# Patient Record
Sex: FEMALE | Race: WHITE | NOT HISPANIC OR LATINO | Employment: FULL TIME | URBAN - METROPOLITAN AREA
[De-identification: names, ages, dates, MRNs, and addresses within clinical notes are randomized per-mention and may not be internally consistent; named-entity substitution may affect disease eponyms.]

---

## 2018-04-03 ENCOUNTER — NON-PROVIDER VISIT (OUTPATIENT)
Dept: URGENT CARE | Facility: CLINIC | Age: 53
End: 2018-04-03

## 2018-04-03 DIAGNOSIS — Z11.1 PPD SCREENING TEST: ICD-10-CM

## 2018-04-03 PROCEDURE — 86580 TB INTRADERMAL TEST: CPT | Performed by: NURSE PRACTITIONER

## 2018-04-03 NOTE — PROGRESS NOTES
Maryjane Gray is a 52 y.o. female here for a non-provider visit for PPD placement -- Step 1 of 1    Reason for PPD:  work requirement    1. TB evaluation questionnaire completed by patient? Yes      -  If any answers marked yes did you contact a provider prior to placing? Not Indicated  2.  Patient notified to return to clinic for reading on: Thursday 4/5/18 after 3:44pm, but before Friday, 4/6/18 at 3:44pm  3.  PPD Placement documentation completed on TB evaluation questionnaire? Yes  4.  Location of TB evaluation questionnaire filed:

## 2018-04-05 ENCOUNTER — APPOINTMENT (OUTPATIENT)
Dept: URGENT CARE | Facility: CLINIC | Age: 53
End: 2018-04-05

## 2018-04-05 LAB — TB WHEAL 3D P 5 TU DIAM: 0 MM

## 2019-07-19 ENCOUNTER — OFFICE VISIT (OUTPATIENT)
Dept: URGENT CARE | Facility: CLINIC | Age: 54
End: 2019-07-19
Payer: COMMERCIAL

## 2019-07-19 VITALS
HEIGHT: 65 IN | RESPIRATION RATE: 12 BRPM | SYSTOLIC BLOOD PRESSURE: 122 MMHG | TEMPERATURE: 99.4 F | DIASTOLIC BLOOD PRESSURE: 72 MMHG | BODY MASS INDEX: 24.66 KG/M2 | OXYGEN SATURATION: 97 % | WEIGHT: 148 LBS | HEART RATE: 72 BPM

## 2019-07-19 DIAGNOSIS — J02.0 PHARYNGITIS DUE TO STREPTOCOCCUS SPECIES: ICD-10-CM

## 2019-07-19 LAB
INT CON NEG: NORMAL
INT CON POS: NORMAL
S PYO AG THROAT QL: NORMAL

## 2019-07-19 PROCEDURE — 87880 STREP A ASSAY W/OPTIC: CPT | Performed by: PHYSICIAN ASSISTANT

## 2019-07-19 PROCEDURE — 99203 OFFICE O/P NEW LOW 30 MIN: CPT | Performed by: PHYSICIAN ASSISTANT

## 2019-07-19 RX ORDER — AMOXICILLIN 500 MG/1
1000 CAPSULE ORAL 2 TIMES DAILY
Qty: 40 CAP | Refills: 0 | Status: SHIPPED | OUTPATIENT
Start: 2019-07-19 | End: 2019-07-29

## 2019-07-19 ASSESSMENT — ENCOUNTER SYMPTOMS
PALPITATIONS: 0
FEVER: 0
DIARRHEA: 0
BLURRED VISION: 0
SHORTNESS OF BREATH: 0
EYE PAIN: 0
VOMITING: 0
DIZZINESS: 0
SWOLLEN GLANDS: 1
ABDOMINAL PAIN: 0
HEADACHES: 1
TROUBLE SWALLOWING: 1
COUGH: 0
SORE THROAT: 1
SINUS PAIN: 0
CHILLS: 0
NAUSEA: 0
MYALGIAS: 1

## 2019-07-19 ASSESSMENT — PAIN SCALES - GENERAL: PAINLEVEL: 7=MODERATE-SEVERE PAIN

## 2019-07-19 NOTE — PROGRESS NOTES
Subjective:   Maryjane Suero a 54 y.o. female who presents for Sore Throat (x2 days); Headache; and Body Aches    Pharyngitis    This is a new problem. The current episode started yesterday. The problem has been unchanged. Neither side of throat is experiencing more pain than the other. There has been no fever. The pain is moderate. Associated symptoms include headaches, a plugged ear sensation, swollen glands and trouble swallowing. Pertinent negatives include no abdominal pain, congestion, coughing, diarrhea, ear pain, shortness of breath or vomiting. She has had no exposure to strep or mono. She has tried NSAIDs for the symptoms. The treatment provided mild relief.       Review of Systems   Constitutional: Positive for malaise/fatigue. Negative for chills and fever.   HENT: Positive for sore throat and trouble swallowing. Negative for congestion, ear pain and sinus pain.    Eyes: Negative for blurred vision and pain.   Respiratory: Negative for cough and shortness of breath.    Cardiovascular: Negative for chest pain and palpitations.   Gastrointestinal: Negative for abdominal pain, diarrhea, nausea and vomiting.   Musculoskeletal: Positive for myalgias.   Skin: Negative for rash.   Neurological: Positive for headaches. Negative for dizziness.        PMH:  has no past medical history on file.  MEDS:   Current Outpatient Prescriptions:   •  Pseudoephedrine-APAP-DM (DAYQUIL PO), Take  by mouth., Disp: , Rfl:   •  amoxicillin (AMOXIL) 500 MG Cap, Take 2 Caps by mouth 2 times a day for 10 days., Disp: 40 Cap, Rfl: 0  ALLERGIES: No Known Allergies  SURGHX:   Past Surgical History:   Procedure Laterality Date   • ABDOMINAL HYSTERECTOMY TOTAL       SOCHX:  reports that she has never smoked. She has never used smokeless tobacco. She reports that she does not drink alcohol or use drugs.  FH: Family history was reviewed, no pertinent findings to report     Objective:   /72   Pulse 72   Temp 37.4 °C (99.4 °F)  "(Temporal)   Resp 12   Ht 1.638 m (5' 4.5\")   Wt 67.1 kg (148 lb)   SpO2 97%   Breastfeeding? No   BMI 25.01 kg/m²      Physical Exam   Constitutional: She is oriented to person, place, and time. She appears well-developed and well-nourished.   HENT:   Head: Normocephalic and atraumatic.   Right Ear: Tympanic membrane, external ear and ear canal normal.   Left Ear: Tympanic membrane, external ear and ear canal normal.   Nose: Nose normal.   Mouth/Throat: Uvula is midline and mucous membranes are normal. Posterior oropharyngeal erythema present.   Eyes: Pupils are equal, round, and reactive to light. Conjunctivae are normal.   Neck: Normal range of motion.   Cardiovascular: Normal rate, regular rhythm and normal heart sounds.    No murmur heard.  Pulmonary/Chest: Effort normal and breath sounds normal. She has no wheezes.   Lymphadenopathy:     She has cervical adenopathy.   Neurological: She is alert and oriented to person, place, and time.   Skin: Skin is warm and dry. Capillary refill takes less than 2 seconds.   Psychiatric: She has a normal mood and affect. Her behavior is normal. Judgment normal.   Vitals reviewed.      POCT Rapid Strep A - POSITIVE    Assessment/Plan:     1. Pharyngitis due to Streptococcus species  - POCT Rapid Strep A  - amoxicillin (AMOXIL) 500 MG Cap; Take 2 Caps by mouth 2 times a day for 10 days.  Dispense: 40 Cap; Refill: 0        Differential diagnosis, natural history, supportive care, and indications for immediate follow-up discussed.  Return if symptoms worsen or fail to improve.    "

## 2019-07-19 NOTE — PATIENT INSTRUCTIONS
Strep Throat  Strep throat is a bacterial infection of the throat. Your health care provider may call the infection tonsillitis or pharyngitis, depending on whether there is swelling in the tonsils or at the back of the throat. Strep throat is most common during the cold months of the year in children who are 5-15 years of age, but it can happen during any season in people of any age. This infection is spread from person to person (contagious) through coughing, sneezing, or close contact.  What are the causes?  Strep throat is caused by the bacteria called Streptococcus pyogenes.  What increases the risk?  This condition is more likely to develop in:  · People who spend time in crowded places where the infection can spread easily.  · People who have close contact with someone who has strep throat.  What are the signs or symptoms?  Symptoms of this condition include:  · Fever or chills.  · Redness, swelling, or pain in the tonsils or throat.  · Pain or difficulty when swallowing.  · White or yellow spots on the tonsils or throat.  · Swollen, tender glands in the neck or under the jaw.  · Red rash all over the body (rare).  How is this diagnosed?  This condition is diagnosed by performing a rapid strep test or by taking a swab of your throat (throat culture test). Results from a rapid strep test are usually ready in a few minutes, but throat culture test results are available after one or two days.  How is this treated?  This condition is treated with antibiotic medicine.  Follow these instructions at home:  Medicines  · Take over-the-counter and prescription medicines only as told by your health care provider.  · Take your antibiotic as told by your health care provider. Do not stop taking the antibiotic even if you start to feel better.  · Have family members who also have a sore throat or fever tested for strep throat. They may need antibiotics if they have the strep infection.  Eating and drinking  · Do not  share food, drinking cups, or personal items that could cause the infection to spread to other people.  · If swallowing is difficult, try eating soft foods until your sore throat feels better.  · Drink enough fluid to keep your urine clear or pale yellow.  General instructions  · Gargle with a salt-water mixture 3-4 times per day or as needed. To make a salt-water mixture, completely dissolve ½-1 tsp of salt in 1 cup of warm water.  · Make sure that all household members wash their hands well.  · Get plenty of rest.  · Stay home from school or work until you have been taking antibiotics for 24 hours.  · Keep all follow-up visits as told by your health care provider. This is important.  Contact a health care provider if:  · The glands in your neck continue to get bigger.  · You develop a rash, cough, or earache.  · You cough up a thick liquid that is green, yellow-brown, or bloody.  · You have pain or discomfort that does not get better with medicine.  · Your problems seem to be getting worse rather than better.  · You have a fever.  Get help right away if:  · You have new symptoms, such as vomiting, severe headache, stiff or painful neck, chest pain, or shortness of breath.  · You have severe throat pain, drooling, or changes in your voice.  · You have swelling of the neck, or the skin on the neck becomes red and tender.  · You have signs of dehydration, such as fatigue, dry mouth, and decreased urination.  · You become increasingly sleepy, or you cannot wake up completely.  · Your joints become red or painful.  This information is not intended to replace advice given to you by your health care provider. Make sure you discuss any questions you have with your health care provider.  Document Released: 12/15/2001 Document Revised: 08/16/2017 Document Reviewed: 04/11/2016  [employer] Interactive Patient Education © 2017 [employer]

## 2019-07-19 NOTE — LETTER
July 19, 2019         Patient: Maryjane Gray   YOB: 1965   Date of Visit: 7/19/2019           To Whom it May Concern:    Maryjane Gray was seen in my clinic on 7/19/2019. She may return to work once she has been on antibiotics for 24 hours.    If you have any questions or concerns, please don't hesitate to call.        Sincerely,           Afshan Melgar P.A.-C.  Electronically Signed

## 2019-11-07 ENCOUNTER — OFFICE VISIT (OUTPATIENT)
Dept: URGENT CARE | Facility: CLINIC | Age: 54
End: 2019-11-07
Payer: COMMERCIAL

## 2019-11-07 VITALS
RESPIRATION RATE: 20 BRPM | BODY MASS INDEX: 24.5 KG/M2 | SYSTOLIC BLOOD PRESSURE: 120 MMHG | OXYGEN SATURATION: 95 % | DIASTOLIC BLOOD PRESSURE: 78 MMHG | TEMPERATURE: 98.9 F | HEART RATE: 66 BPM | WEIGHT: 145 LBS

## 2019-11-07 DIAGNOSIS — R05.9 COUGH: ICD-10-CM

## 2019-11-07 DIAGNOSIS — J01.00 ACUTE MAXILLARY SINUSITIS, RECURRENCE NOT SPECIFIED: ICD-10-CM

## 2019-11-07 PROCEDURE — 99214 OFFICE O/P EST MOD 30 MIN: CPT | Performed by: NURSE PRACTITIONER

## 2019-11-07 RX ORDER — AMOXICILLIN AND CLAVULANATE POTASSIUM 875; 125 MG/1; MG/1
1 TABLET, FILM COATED ORAL 2 TIMES DAILY
Qty: 14 TAB | Refills: 0 | Status: SHIPPED | OUTPATIENT
Start: 2019-11-07 | End: 2019-11-14

## 2019-11-07 RX ORDER — BENZONATATE 200 MG/1
200 CAPSULE ORAL 3 TIMES DAILY PRN
Qty: 60 CAP | Refills: 0 | Status: SHIPPED | OUTPATIENT
Start: 2019-11-07 | End: 2021-12-14

## 2019-11-07 ASSESSMENT — ENCOUNTER SYMPTOMS
HEADACHES: 1
MYALGIAS: 1
FEVER: 0
SPUTUM PRODUCTION: 1
CHILLS: 0
COUGH: 1
RHINORRHEA: 1

## 2019-11-07 NOTE — PROGRESS NOTES
Subjective:      Maryjane Gray is a 54 y.o. female who presents with Cough (X 7 days dry cough , sorethroat , hoarseness.)    History reviewed. No pertinent past medical history.  Social History     Socioeconomic History   • Marital status: Single     Spouse name: Not on file   • Number of children: Not on file   • Years of education: Not on file   • Highest education level: Not on file   Occupational History   • Not on file   Social Needs   • Financial resource strain: Not on file   • Food insecurity:     Worry: Not on file     Inability: Not on file   • Transportation needs:     Medical: Not on file     Non-medical: Not on file   Tobacco Use   • Smoking status: Never Smoker   • Smokeless tobacco: Never Used   Substance and Sexual Activity   • Alcohol use: No   • Drug use: No   • Sexual activity: Not on file   Lifestyle   • Physical activity:     Days per week: Not on file     Minutes per session: Not on file   • Stress: Not on file   Relationships   • Social connections:     Talks on phone: Not on file     Gets together: Not on file     Attends Mandaeism service: Not on file     Active member of club or organization: Not on file     Attends meetings of clubs or organizations: Not on file     Relationship status: Not on file   • Intimate partner violence:     Fear of current or ex partner: Not on file     Emotionally abused: Not on file     Physically abused: Not on file     Forced sexual activity: Not on file   Other Topics Concern   • Not on file   Social History Narrative   • Not on file     History reviewed. No pertinent family history.    Allergies: Patient has no known allergies.    Patient is a 54-year-old female who presents today with complaint of pain to the maxillary sinuses with thick yellow nasal drainage.  She has had also a dry cough and hoarseness over the last week.  Denies fever, aches, or chills.        Cough   This is a new problem. The current episode started in the past 7 days. The problem  has been unchanged. The problem occurs every few minutes. The cough is productive of sputum. Associated symptoms include ear pain, headaches, myalgias, nasal congestion, postnasal drip and rhinorrhea. Pertinent negatives include no chills or fever. She has tried nothing for the symptoms. The treatment provided no relief.       Review of Systems   Constitutional: Positive for malaise/fatigue. Negative for chills and fever.   HENT: Positive for congestion, ear pain, postnasal drip and rhinorrhea.    Respiratory: Positive for cough and sputum production.    Musculoskeletal: Positive for myalgias.   Skin: Negative.    Neurological: Positive for headaches.   All other systems reviewed and are negative.         Objective:     /78   Pulse 66   Temp 37.2 °C (98.9 °F) (Temporal)   Resp 20   Wt 65.8 kg (145 lb)   SpO2 95%   BMI 24.50 kg/m²      Physical Exam  Vitals signs reviewed.   HENT:      Head: Normocephalic.      Comments: Tender over the maxillary sinuses, thick yellow PND  Voice is hoarse     Right Ear: Tympanic membrane normal.      Left Ear: Tympanic membrane normal.      Nose: Nose normal.   Eyes:      Pupils: Pupils are equal, round, and reactive to light.   Neck:      Musculoskeletal: Normal range of motion and neck supple.   Cardiovascular:      Rate and Rhythm: Normal rate and regular rhythm.   Pulmonary:      Effort: Pulmonary effort is normal.      Breath sounds: Normal breath sounds.      Comments: Dry cough  Musculoskeletal: Normal range of motion.   Skin:     General: Skin is warm and dry.      Capillary Refill: Capillary refill takes less than 2 seconds.   Neurological:      Mental Status: She is alert.   Psychiatric:         Mood and Affect: Mood normal.         Behavior: Behavior normal.                 Assessment/Plan:   Sinusitis  Cough    augmentin  Tessalon PRN cough  Push fluids  Humidifier  Follow up for persistent or worsening of symptoms     There are no diagnoses linked to this  encounter.

## 2020-05-14 ENCOUNTER — NON-PROVIDER VISIT (OUTPATIENT)
Dept: URGENT CARE | Facility: CLINIC | Age: 55
End: 2020-05-14

## 2020-05-14 DIAGNOSIS — Z11.1 PPD SCREENING TEST: ICD-10-CM

## 2020-05-16 ENCOUNTER — NON-PROVIDER VISIT (OUTPATIENT)
Dept: URGENT CARE | Facility: CLINIC | Age: 55
End: 2020-05-16

## 2020-05-16 LAB — TB WHEAL 3D P 5 TU DIAM: 0 MM

## 2020-05-16 PROCEDURE — 86580 TB INTRADERMAL TEST: CPT | Performed by: PHYSICIAN ASSISTANT

## 2020-05-16 NOTE — NON-PROVIDER
Maryjane Gray is a 54 y.o. female here for a non-provider visit for PPD reading -- Step 1 of 1.      1.  Resulted in Epic under enter/edit results? Yes   2.  TB evaluation questionnaire scanned into chart and original given to patient?Yes      3. Was induration greater than 0 mm? No.

## 2020-05-16 NOTE — NON-PROVIDER
Maryjane Gray is a 54 y.o. female here for a non-provider visit for PPD placement -- Step 1 of 1    Reason for PPD:  work requirement    1. TB evaluation questionnaire completed by patient? Yes      -  If any answers marked yes did you contact a provider prior to placing? Not Indicated  2.  Patient notified to return to clinic for reading on: 05/16/20 or 05/17/20  3.  PPD Placement documentation completed on TB evaluation questionnaire? Yes  4.  Location of TB evaluation questionnaire filed:

## 2021-12-14 ENCOUNTER — APPOINTMENT (OUTPATIENT)
Dept: RADIOLOGY | Facility: MEDICAL CENTER | Age: 56
End: 2021-12-14
Attending: EMERGENCY MEDICINE
Payer: COMMERCIAL

## 2021-12-14 ENCOUNTER — HOSPITAL ENCOUNTER (INPATIENT)
Facility: MEDICAL CENTER | Age: 56
LOS: 3 days | DRG: 064 | End: 2021-12-17
Attending: INTERNAL MEDICINE | Admitting: HOSPITALIST
Payer: COMMERCIAL

## 2021-12-14 ENCOUNTER — HOSPITAL ENCOUNTER (EMERGENCY)
Facility: MEDICAL CENTER | Age: 56
End: 2021-12-14
Payer: COMMERCIAL

## 2021-12-14 ENCOUNTER — APPOINTMENT (OUTPATIENT)
Dept: RADIOLOGY | Facility: MEDICAL CENTER | Age: 56
DRG: 064 | End: 2021-12-14
Attending: NURSE PRACTITIONER
Payer: COMMERCIAL

## 2021-12-14 ENCOUNTER — APPOINTMENT (OUTPATIENT)
Dept: RADIOLOGY | Facility: MEDICAL CENTER | Age: 56
DRG: 064 | End: 2021-12-14
Attending: PSYCHIATRY & NEUROLOGY
Payer: COMMERCIAL

## 2021-12-14 VITALS
HEIGHT: 64 IN | DIASTOLIC BLOOD PRESSURE: 83 MMHG | HEART RATE: 80 BPM | WEIGHT: 145 LBS | SYSTOLIC BLOOD PRESSURE: 186 MMHG | TEMPERATURE: 98.1 F | RESPIRATION RATE: 17 BRPM | OXYGEN SATURATION: 95 % | BODY MASS INDEX: 24.75 KG/M2

## 2021-12-14 DIAGNOSIS — I61.9 INTRAPARENCHYMAL HEMORRHAGE OF BRAIN (HCC): ICD-10-CM

## 2021-12-14 DIAGNOSIS — I10 HYPERTENSION, UNSPECIFIED TYPE: ICD-10-CM

## 2021-12-14 DIAGNOSIS — I62.9 INTRACRANIAL HEMORRHAGE (HCC): ICD-10-CM

## 2021-12-14 LAB
ALBUMIN SERPL BCP-MCNC: 4.2 G/DL (ref 3.2–4.9)
ALBUMIN/GLOB SERPL: 1.2 G/DL
ALP SERPL-CCNC: 112 U/L (ref 30–99)
ALT SERPL-CCNC: 63 U/L (ref 2–50)
ANION GAP SERPL CALC-SCNC: 14 MMOL/L (ref 7–16)
APTT PPP: 29.6 SEC (ref 24.7–36)
AST SERPL-CCNC: 45 U/L (ref 12–45)
BASOPHILS # BLD AUTO: 0.8 % (ref 0–1.8)
BASOPHILS # BLD: 0.07 K/UL (ref 0–0.12)
BILIRUB SERPL-MCNC: 0.4 MG/DL (ref 0.1–1.5)
BUN SERPL-MCNC: 14 MG/DL (ref 8–22)
CALCIUM SERPL-MCNC: 9.4 MG/DL (ref 8.4–10.2)
CFT BLD TEG: 6.1 MIN (ref 4.6–9.1)
CFT P HPASE BLD TEG: 5.3 MIN (ref 4.3–8.3)
CHLORIDE SERPL-SCNC: 106 MMOL/L (ref 96–112)
CHOLEST SERPL-MCNC: 264 MG/DL (ref 100–199)
CLOT ANGLE BLD TEG: 74.6 DEGREES (ref 63–78)
CLOT LYSIS 30M P MA LENFR BLD TEG: 0 % (ref 0–2.6)
CO2 SERPL-SCNC: 22 MMOL/L (ref 20–33)
CREAT SERPL-MCNC: 0.65 MG/DL (ref 0.5–1.4)
CT.EXTRINSIC BLD ROTEM: 1.2 MIN (ref 0.8–2.1)
EKG IMPRESSION: NORMAL
EOSINOPHIL # BLD AUTO: 0.8 K/UL (ref 0–0.51)
EOSINOPHIL NFR BLD: 9.3 % (ref 0–6.9)
ERYTHROCYTE [DISTWIDTH] IN BLOOD BY AUTOMATED COUNT: 44.2 FL (ref 35.9–50)
GLOBULIN SER CALC-MCNC: 3.5 G/DL (ref 1.9–3.5)
GLUCOSE BLD-MCNC: 101 MG/DL (ref 65–99)
GLUCOSE SERPL-MCNC: 98 MG/DL (ref 65–99)
HCT VFR BLD AUTO: 46 % (ref 37–47)
HDLC SERPL-MCNC: 48 MG/DL
HGB BLD-MCNC: 15.3 G/DL (ref 12–16)
IMM GRANULOCYTES # BLD AUTO: 0.01 K/UL (ref 0–0.11)
IMM GRANULOCYTES NFR BLD AUTO: 0.1 % (ref 0–0.9)
INR PPP: 0.95 (ref 0.87–1.13)
LDLC SERPL CALC-MCNC: 179 MG/DL
LYMPHOCYTES # BLD AUTO: 3.63 K/UL (ref 1–4.8)
LYMPHOCYTES NFR BLD: 42.2 % (ref 22–41)
MCF BLD TEG: 60.6 MM (ref 52–69)
MCF.PLATELET INHIB BLD ROTEM: 22.6 MM (ref 15–32)
MCH RBC QN AUTO: 30.1 PG (ref 27–33)
MCHC RBC AUTO-ENTMCNC: 33.3 G/DL (ref 33.6–35)
MCV RBC AUTO: 90.4 FL (ref 81.4–97.8)
MONOCYTES # BLD AUTO: 0.68 K/UL (ref 0–0.85)
MONOCYTES NFR BLD AUTO: 7.9 % (ref 0–13.4)
NEUTROPHILS # BLD AUTO: 3.42 K/UL (ref 2–7.15)
NEUTROPHILS NFR BLD: 39.7 % (ref 44–72)
NRBC # BLD AUTO: 0 K/UL
NRBC BLD-RTO: 0 /100 WBC
PA AA BLD-ACNC: 64.2 % (ref 0–11)
PA ADP BLD-ACNC: 15.4 % (ref 0–17)
PLATELET # BLD AUTO: 190 K/UL (ref 164–446)
PMV BLD AUTO: 10.9 FL (ref 9–12.9)
POTASSIUM SERPL-SCNC: 3.6 MMOL/L (ref 3.6–5.5)
PROT SERPL-MCNC: 7.7 G/DL (ref 6–8.2)
PROTHROMBIN TIME: 11.9 SEC (ref 12–14.6)
RBC # BLD AUTO: 5.09 M/UL (ref 4.2–5.4)
SODIUM SERPL-SCNC: 142 MMOL/L (ref 135–145)
SODIUM SERPL-SCNC: 143 MMOL/L (ref 135–145)
SODIUM SERPL-SCNC: 144 MMOL/L (ref 135–145)
TEG ALGORITHM TGALG: ABNORMAL
TRIGL SERPL-MCNC: 184 MG/DL (ref 0–149)
TROPONIN T SERPL-MCNC: <6 NG/L (ref 6–19)
WBC # BLD AUTO: 8.6 K/UL (ref 4.8–10.8)

## 2021-12-14 PROCEDURE — 70450 CT HEAD/BRAIN W/O DYE: CPT

## 2021-12-14 PROCEDURE — 92610 EVALUATE SWALLOWING FUNCTION: CPT

## 2021-12-14 PROCEDURE — 700111 HCHG RX REV CODE 636 W/ 250 OVERRIDE (IP): Performed by: PSYCHIATRY & NEUROLOGY

## 2021-12-14 PROCEDURE — 700101 HCHG RX REV CODE 250: Performed by: STUDENT IN AN ORGANIZED HEALTH CARE EDUCATION/TRAINING PROGRAM

## 2021-12-14 PROCEDURE — 82962 GLUCOSE BLOOD TEST: CPT

## 2021-12-14 PROCEDURE — 700101 HCHG RX REV CODE 250: Performed by: EMERGENCY MEDICINE

## 2021-12-14 PROCEDURE — 80053 COMPREHEN METABOLIC PANEL: CPT

## 2021-12-14 PROCEDURE — 80061 LIPID PANEL: CPT

## 2021-12-14 PROCEDURE — 85730 THROMBOPLASTIN TIME PARTIAL: CPT

## 2021-12-14 PROCEDURE — 99291 CRITICAL CARE FIRST HOUR: CPT | Performed by: PSYCHIATRY & NEUROLOGY

## 2021-12-14 PROCEDURE — 99285 EMERGENCY DEPT VISIT HI MDM: CPT

## 2021-12-14 PROCEDURE — 85025 COMPLETE CBC W/AUTO DIFF WBC: CPT

## 2021-12-14 PROCEDURE — 700105 HCHG RX REV CODE 258: Performed by: STUDENT IN AN ORGANIZED HEALTH CARE EDUCATION/TRAINING PROGRAM

## 2021-12-14 PROCEDURE — 700105 HCHG RX REV CODE 258: Performed by: PSYCHIATRY & NEUROLOGY

## 2021-12-14 PROCEDURE — 96374 THER/PROPH/DIAG INJ IV PUSH: CPT

## 2021-12-14 PROCEDURE — 84295 ASSAY OF SERUM SODIUM: CPT

## 2021-12-14 PROCEDURE — 99223 1ST HOSP IP/OBS HIGH 75: CPT | Performed by: STUDENT IN AN ORGANIZED HEALTH CARE EDUCATION/TRAINING PROGRAM

## 2021-12-14 PROCEDURE — 85610 PROTHROMBIN TIME: CPT

## 2021-12-14 PROCEDURE — 770022 HCHG ROOM/CARE - ICU (200)

## 2021-12-14 PROCEDURE — 93005 ELECTROCARDIOGRAM TRACING: CPT | Performed by: EMERGENCY MEDICINE

## 2021-12-14 PROCEDURE — 84484 ASSAY OF TROPONIN QUANT: CPT

## 2021-12-14 PROCEDURE — 85576 BLOOD PLATELET AGGREGATION: CPT | Mod: 91

## 2021-12-14 PROCEDURE — 85347 COAGULATION TIME ACTIVATED: CPT

## 2021-12-14 PROCEDURE — 700105 HCHG RX REV CODE 258: Performed by: EMERGENCY MEDICINE

## 2021-12-14 PROCEDURE — 99223 1ST HOSP IP/OBS HIGH 75: CPT | Performed by: PHYSICAL MEDICINE & REHABILITATION

## 2021-12-14 PROCEDURE — 85384 FIBRINOGEN ACTIVITY: CPT

## 2021-12-14 RX ORDER — ACETAMINOPHEN 325 MG/1
650 TABLET ORAL EVERY 4 HOURS PRN
Status: DISCONTINUED | OUTPATIENT
Start: 2021-12-14 | End: 2021-12-17 | Stop reason: HOSPADM

## 2021-12-14 RX ORDER — ONDANSETRON 4 MG/1
4 TABLET, ORALLY DISINTEGRATING ORAL EVERY 4 HOURS PRN
Status: DISCONTINUED | OUTPATIENT
Start: 2021-12-14 | End: 2021-12-17 | Stop reason: HOSPADM

## 2021-12-14 RX ORDER — ONDANSETRON 2 MG/ML
4 INJECTION INTRAMUSCULAR; INTRAVENOUS EVERY 4 HOURS PRN
Status: DISCONTINUED | OUTPATIENT
Start: 2021-12-14 | End: 2021-12-14

## 2021-12-14 RX ORDER — ONDANSETRON 4 MG/1
4 TABLET, ORALLY DISINTEGRATING ORAL EVERY 4 HOURS PRN
Status: DISCONTINUED | OUTPATIENT
Start: 2021-12-14 | End: 2021-12-14

## 2021-12-14 RX ORDER — BISACODYL 10 MG
10 SUPPOSITORY, RECTAL RECTAL
Status: DISCONTINUED | OUTPATIENT
Start: 2021-12-14 | End: 2021-12-17 | Stop reason: HOSPADM

## 2021-12-14 RX ORDER — SODIUM CHLORIDE 9 MG/ML
INJECTION, SOLUTION INTRAVENOUS CONTINUOUS
Status: DISCONTINUED | OUTPATIENT
Start: 2021-12-14 | End: 2021-12-14

## 2021-12-14 RX ORDER — LORAZEPAM 2 MG/ML
2 INJECTION INTRAMUSCULAR
Status: DISCONTINUED | OUTPATIENT
Start: 2021-12-14 | End: 2021-12-17 | Stop reason: HOSPADM

## 2021-12-14 RX ORDER — BISACODYL 10 MG
10 SUPPOSITORY, RECTAL RECTAL
Status: DISCONTINUED | OUTPATIENT
Start: 2021-12-14 | End: 2021-12-14

## 2021-12-14 RX ORDER — NICARDIPINE HYDROCHLORIDE 2.5 MG/ML
INJECTION INTRAVENOUS
Status: DISCONTINUED
Start: 2021-12-14 | End: 2021-12-14 | Stop reason: HOSPADM

## 2021-12-14 RX ORDER — LABETALOL HYDROCHLORIDE 5 MG/ML
INJECTION, SOLUTION INTRAVENOUS
Status: DISCONTINUED
Start: 2021-12-14 | End: 2021-12-14 | Stop reason: HOSPADM

## 2021-12-14 RX ORDER — ACETAMINOPHEN 325 MG/1
650 TABLET ORAL EVERY 4 HOURS PRN
Status: DISCONTINUED | OUTPATIENT
Start: 2021-12-14 | End: 2021-12-14

## 2021-12-14 RX ORDER — AMOXICILLIN 250 MG
2 CAPSULE ORAL 2 TIMES DAILY
Status: DISCONTINUED | OUTPATIENT
Start: 2021-12-14 | End: 2021-12-17 | Stop reason: HOSPADM

## 2021-12-14 RX ORDER — ONDANSETRON 2 MG/ML
4 INJECTION INTRAMUSCULAR; INTRAVENOUS EVERY 4 HOURS PRN
Status: DISCONTINUED | OUTPATIENT
Start: 2021-12-14 | End: 2021-12-17 | Stop reason: HOSPADM

## 2021-12-14 RX ORDER — 3% SODIUM CHLORIDE 3 G/100ML
500 INJECTION, SOLUTION INTRAVENOUS CONTINUOUS
Status: DISCONTINUED | OUTPATIENT
Start: 2021-12-14 | End: 2021-12-16

## 2021-12-14 RX ORDER — POTASSIUM CHLORIDE 7.45 MG/ML
10 INJECTION INTRAVENOUS
Status: DISCONTINUED | OUTPATIENT
Start: 2021-12-14 | End: 2021-12-14

## 2021-12-14 RX ORDER — 3% SODIUM CHLORIDE 3 G/100ML
INJECTION, SOLUTION INTRAVENOUS CONTINUOUS
Status: DISCONTINUED | OUTPATIENT
Start: 2021-12-14 | End: 2021-12-14

## 2021-12-14 RX ORDER — POLYETHYLENE GLYCOL 3350 17 G/17G
1 POWDER, FOR SOLUTION ORAL
Status: DISCONTINUED | OUTPATIENT
Start: 2021-12-14 | End: 2021-12-14

## 2021-12-14 RX ORDER — ACETAMINOPHEN 650 MG/1
650 SUPPOSITORY RECTAL EVERY 4 HOURS PRN
Status: DISCONTINUED | OUTPATIENT
Start: 2021-12-14 | End: 2021-12-14

## 2021-12-14 RX ORDER — POTASSIUM CHLORIDE 7.45 MG/ML
10 INJECTION INTRAVENOUS
Status: COMPLETED | OUTPATIENT
Start: 2021-12-14 | End: 2021-12-14

## 2021-12-14 RX ORDER — AMOXICILLIN 250 MG
2 CAPSULE ORAL 2 TIMES DAILY
Status: DISCONTINUED | OUTPATIENT
Start: 2021-12-14 | End: 2021-12-14

## 2021-12-14 RX ORDER — ACETAMINOPHEN 650 MG/1
650 SUPPOSITORY RECTAL EVERY 4 HOURS PRN
Status: DISCONTINUED | OUTPATIENT
Start: 2021-12-14 | End: 2021-12-17 | Stop reason: HOSPADM

## 2021-12-14 RX ORDER — POLYETHYLENE GLYCOL 3350 17 G/17G
1 POWDER, FOR SOLUTION ORAL
Status: DISCONTINUED | OUTPATIENT
Start: 2021-12-14 | End: 2021-12-17 | Stop reason: HOSPADM

## 2021-12-14 RX ADMIN — NICARDIPINE HYDROCHLORIDE 10 MG/HR: 25 INJECTION, SOLUTION INTRAVENOUS at 08:40

## 2021-12-14 RX ADMIN — POTASSIUM CHLORIDE 10 MEQ: 7.46 INJECTION, SOLUTION INTRAVENOUS at 14:50

## 2021-12-14 RX ADMIN — POTASSIUM CHLORIDE 10 MEQ: 7.46 INJECTION, SOLUTION INTRAVENOUS at 16:02

## 2021-12-14 RX ADMIN — POTASSIUM CHLORIDE 10 MEQ: 7.46 INJECTION, SOLUTION INTRAVENOUS at 12:18

## 2021-12-14 RX ADMIN — NICARDIPINE HYDROCHLORIDE 5 MG/HR: 25 INJECTION, SOLUTION INTRAVENOUS at 12:21

## 2021-12-14 RX ADMIN — NICARDIPINE HYDROCHLORIDE 5 MG/HR: 25 INJECTION, SOLUTION INTRAVENOUS at 23:46

## 2021-12-14 RX ADMIN — SODIUM CHLORIDE: 3 INJECTION, SOLUTION INTRAVENOUS at 08:21

## 2021-12-14 RX ADMIN — SODIUM CHLORIDE 500 ML: 3 INJECTION, SOLUTION INTRAVENOUS at 17:40

## 2021-12-14 RX ADMIN — NICARDIPINE HYDROCHLORIDE 2 MG/HR: 25 INJECTION, SOLUTION INTRAVENOUS at 05:50

## 2021-12-14 RX ADMIN — POTASSIUM CHLORIDE 10 MEQ: 7.46 INJECTION, SOLUTION INTRAVENOUS at 13:40

## 2021-12-14 RX ADMIN — NICARDIPINE HYDROCHLORIDE 5 MG/HR: 25 INJECTION, SOLUTION INTRAVENOUS at 17:40

## 2021-12-14 ASSESSMENT — COPD QUESTIONNAIRES
DO YOU EVER COUGH UP ANY MUCUS OR PHLEGM?: NO/ONLY WITH OCCASIONAL COLDS OR INFECTIONS
DURING THE PAST 4 WEEKS HOW MUCH DID YOU FEEL SHORT OF BREATH: NONE/LITTLE OF THE TIME
HAVE YOU SMOKED AT LEAST 100 CIGARETTES IN YOUR ENTIRE LIFE: NO/DON'T KNOW
COPD SCREENING SCORE: 1

## 2021-12-14 ASSESSMENT — FIBROSIS 4 INDEX: FIB4 SCORE: 1.73

## 2021-12-14 NOTE — THERAPY
Speech Language Pathology   Clinical Swallow Evaluation     Patient Name: Maryjane Gray  AGE:  56 y.o., SEX:  female  Medical Record #: [id number]  Today's Date: 12/14/2021     Precautions  Precautions: Fall Risk,Swallow Precautions ( See Comments)    Assessment    Patient is 56 y.o. 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    Patient participated in clinical swallow evaluation this date.     Pertinent Information    Respiratory status: Normal- room air   PO trials: ice chips and mildly thick liquids  Behavioral observations: Aphasic  and Drowsy   Drooling/excess secretions: Within Normal Limits    Clinical swallow examination:    The patient presents with significant L facial droop. Significantly reduced ROM of lingual and labial musculature. Unable to achieve tongue protrusion or lateralization. Patient's volitional cough is weak and would be very unlikely to be effective in clearing aspirated material. The patient is presenting with severe expressive aphasia and did not achieve vocalizations.      The patient was provided minimal trials of ice chips and mildly thick liquids via tsp. Patient had adequate oral acceptance. Swallow trigger was significantly delayed up to 30 seconds per bolus. No cough was appreciated, however, since patient is unable to achieve volitional cough, this is not strong evidence against aspiration. Swallow appeared effortful and the patient was noted to fatigue quickly this session. Unable to listen for change in vocal quality d/t patients aphasia. Trials were discontinued as patient refused additional PO and became very fatigued, falling to sleep almost immediately after trials were discontinued. The patient remains a HIGH risk for aspiration.    Clinical Impressions:    Oropharyngeal dysphagia suspected based on clinical bedside evaluation. Contributing risk factors include deficits in alertness, aphonia/aphasia, and inability to achieve a functional  volitional cough. Patient with increased risk for silent aspiration given potential sensory deficits related to basal ganglia stroke. Instrumental swallow study via FEES recommended in the future as patient is able to participate to visualize oropharyngeal swallow function, determine least restrictive diet and appropriate treatment plan.     Recommendations:    Recommend NPO with consideration for non-oral nutrition. Discussed results of swallow evaluation with patients  and daughter at bedside, who verbalized understanding of results.     Plan    Recommend Speech Therapy 5 times per week until therapy goals are met for the following treatments:  Dysphagia Training.    Discharge Recommendations: Recommend post-acute placement for additional speech therapy services prior to discharge home     Objective       12/14/21 1042   Oral Motor Eval    Is Patient Able to Complete Oral Motor Eval Yes but Impaired   Labial Function   Labial Structure At Rest Moderate   Labial Vowel Production / I /, / U / Severe   Labial Sequence / I /, / U / Severe   Lingual Function   Lingual Structure At Rest Within Functional Limits   Lingual Protrude Severe   Lingual Retract Within Functional Limits   Lateralization Severe Right;Severe Left   Lick Lips (Circular) Severe   Jaw   Jaw Structure At Rest Within Functional Limits   Bite ([place]) Severe   Velar Function   Velar Structure At Rest Within Functional Limits   / A / Prolonged Severe   Laryngeal Function   Voice Quality Severe   Volutional Cough Severe   Excursion Upon Swallow Complete   Oral Food Presentation   Ice Chips Moderate   Single Swallow Mildly Thick (2) - (Nectar Thick)  Severe   Tracheostomy   Tracheostomy  No   Dysphagia Strategies / Recommendations   Strategies / Interventions Recommended (Yes / No) Yes   Compensatory Strategies To Be Assessed   Diet / Liquid Recommendation NPO;Pre-Feeding Trials with SLP Only   Medication Administration  Via Gastric Tube  "  Dysphagia Rating   Nutritional Liquid Intake Rating Scale Nothing by mouth   Nutritional Food Intake Rating Scale Nothing by mouth   Patient / Family Goals   Patient / Family Goal #1 'How long is she going to be here?\" - at bedside    Short Term Goals   Short Term Goal # 1 The pt will consume prefeeding trials with no overt s/sx of aspiration          "

## 2021-12-14 NOTE — DISCHARGE PLANNING
[facility] Transitional Care Coordination    Referral from:  Hung RUSSO  Insurance Provider on Facesheet: [facility]  Potential Rehab Diagnosis: R/o stroke    Chart review indicates patient may need on going medical management and may have therapy needs to possibly meet inpatient rehab facility criteria with the goal of returning to community.    D/C support: TBD     Physiatry consultation forwarded per protocol.     Last Covid test:  Pending    R/o stroke - pending further workup and therapy evals when appropriate. Physiatry to consult, TCC will continue to follow.     Thank you for the referral.

## 2021-12-14 NOTE — ED TRIAGE NOTES
"Chief Complaint   Patient presents with   • Possible Stroke     Last known normal 4:45 AM. Spouse reports that she moved the car and he left to go to work. She was normal baseline at this point. Daughter called  that patient was having a stroke. Patient has right sided facial droop and right sided weakness.  and patient denies falls recently. Denies blood thinners.        BP (!) 188/120   Pulse 67   Temp 36.7 °C (98.1 °F) (Temporal)   Resp 18   Ht 1.626 m (5' 4\")   Wt 65.8 kg (145 lb)   SpO2 98%     "

## 2021-12-14 NOTE — PROGRESS NOTES
[facility] TRIAGE OFFICER DIRECT ADMISSION REPORT  Transferring facility: [facility]  Transferring physician: Dr Hilario  Chief complaint: ICH  Pertinent history & patient course: abrupt onset R sided weakness  Pertinent imaging & lab results: CTH L BG hemorrhage with vasogenic edema  Code Status: full per transferring provider, I personally verified with the transferring provider patient's code status and the transferring provider has confirmed this with the patient.  Further work up or recommendations per triage officer prior to transfer: NA  Consultants called prior to transfer and pertinent input from consultants: Neurology  Patient accepted for transfer: Yes  Consultants to be called upon arrival: Neurology  Admission status: Inpatient.   Floor requested: RICU  If ICU transfer, name of intensivist case discussed with and pertinent input from critical care: Elvis Damon MD    Please inform the triage officer upon arrival of the patient to [facility] for assignment of a hospitalist to perform admission.     For any question or concerns regarding the care of this patient, please reach out to the assigned hospitalist.

## 2021-12-14 NOTE — DIETARY
"Nutrition Support Assessment   Day 0 of admit.  Maryjane Gray is a 56 y.o. female with admitting DX of Intracranial hemorrhage.     Current problem list:  1. Intracranial hemorrhage  2. History of hypertension per chart review     Assessment:  Estimated Nutritional Needs: based on:   Height: 165.1 cm (5' 5\")  Weight: 65.8 kg (145 lb 1 oz)  Weight to Use in Calculations: 65.8 kg (145 lb 1 oz)  Ideal Body Weight: 56.7 kg (125 lb)  Body mass index is 24.14 kg/m²., BMI classification: Normal    Calculation/Equation: REE with MSJ x 1.2-1.3  Total Calories / day: 1488 - 1612 (Calories / k.6 - 24.5)  Total Grams Protein / day: 66 - 79 (Grams Protein / k - 1.2)     Evaluation:   1. SLP evaluation completed today and recommended NPO with consideration for non-oral nutrition. Enteral protocol was initiated.  2. Cortrak placement pending.  3. Labs  include Alb 4.2, Glu 98, K+ 3.6  4. Medications include KCl, Senna, Hypertonic saline at 20 ml/hour  5. LBM PTA  6. Fibersource HN appropriate to meet nutrition needs.     Malnutrition Risk: Criteria not noted.     Recommendations/Plan:  1. Start Fibersource HN and advance as tolerated to goal rate of 55 ml/hour to provide 1584 kcal, 71 gm protein, and 1069 ml free water in 24 hours.  2. Fluids per MD.  3. Monitor weight.  4. Diet initiation and advancement per SLP.    RD following                "

## 2021-12-14 NOTE — PROGRESS NOTES
Med Rec completed per patient's S/O   Allergies reviewed  No ORAL antibiotics in last 30 days

## 2021-12-14 NOTE — CONSULTS
Critical Care Consultation    Date of consult: 12/14/2021    Referring Physician  Elvis Damon M.D.    Reason for Consultation  ICH    History of Presenting Illness  56 y.o. female who presented 12/14/2021 with acute onset r-sided weakness and found to have a left BG ICH. She was tx to [facility] from  for further care in the ICU. Upon presentation her SBP was noted to be > 180.    Code Status  Full Code    Review of Systems  Review of Systems   Unable to perform ROS: Acuity of condition       Past Medical History   has no past medical history on file.    Surgical History   has a past surgical history that includes abdominal hysterectomy total.    Family History  family history is not on file.    Social History   reports that she has never smoked. She has never used smokeless tobacco. She reports that she does not drink alcohol and does not use drugs.    Medications  Home Medications     Reviewed by Cecil Santos (Pharmacy Tech) on 12/14/21 at 0823  Med List Status: Complete   Medication Last Dose Status   asa/apap/caffeine (EXCEDRIN) 250-250-65 MG Tab PRN Active              Current Facility-Administered Medications   Medication Dose Route Frequency Provider Last Rate Last Admin   • 3% sodium chloride (HYPERTONIC SALINE) 500mL infusion   Intravenous Continuous Andriy Collazo M.D. 20 mL/hr at 12/14/21 0821 New Bag at 12/14/21 0821   • niCARdipine (CARDENE) 25 mg in  mL Infusion  0-15 mg/hr Intravenous Continuous Ricky Menendez M.D. 50 mL/hr at 12/14/21 1221 5 mg/hr at 12/14/21 1221   • Respiratory Therapy Consult   Nebulization Continuous RT VIRGEN Glass.P.R.N.       • LORazepam (ATIVAN) injection 2 mg  2 mg Intravenous Q5 MIN PRN VANESSA GlassP.R.N.       • MD Alert...ICU Electrolyte Replacement per Pharmacy   Other PHARMACY TO DOSE VIRGEN Glass.P.R.N.       • potassium chloride (KCL) ivpb 10 mEq  10 mEq Intravenous Q HOUR Andriy Collazo M.D. 100 mL/hr at 12/14/21 1218 10 mEq at  12/14/21 1218   • Pharmacy Consult: Enteral tube insertion - review meds/change route/product selection  1 Each Other PHARMACY TO DOSE Ricky Menendez M.D.       • senna-docusate (PERICOLACE or SENOKOT S) 8.6-50 MG per tablet 2 Tablet  2 Tablet Enteral Tube BID Elvis Damon M.D.        And   • polyethylene glycol/lytes (MIRALAX) PACKET 1 Packet  1 Packet Enteral Tube QDAY PRN Elvis Damon M.D.        And   • magnesium hydroxide (MILK OF MAGNESIA) suspension 30 mL  30 mL Enteral Tube QDAY PRN Elvis Damon M.D.        And   • bisacodyl (DULCOLAX) suppository 10 mg  10 mg Rectal QDAY PRN Elvis Damon M.D.       • acetaminophen (Tylenol) tablet 650 mg  650 mg Enteral Tube Q4HRS PRN Elvis Damon M.D.        Or   • acetaminophen (TYLENOL) suppository 650 mg  650 mg Rectal Q4HRS PRN Elvis Damon M.D.       • ondansetron (ZOFRAN ODT) dispertab 4 mg  4 mg Enteral Tube Q4HRS PRN Elvis Damon M.D.        Or   • ondansetron (ZOFRAN) syringe/vial injection 4 mg  4 mg Intravenous Q4HRS PRN Elvis Damon M.D.           Allergies  No Known Allergies    Vital Signs last 24 hours  Temp:  [35.9 °C (96.7 °F)-36.1 °C (97 °F)] 36.1 °C (97 °F)  Pulse:  [68-86] 76  Resp:  [19-66] 37  BP: (110-167)/(55-84) 127/59  SpO2:  [90 %-98 %] 96 %    Physical Exam  Physical Exam  Constitutional:       General: She is not in acute distress.  HENT:      Head: Atraumatic.      Mouth/Throat:      Pharynx: Oropharynx is clear.   Eyes:      Extraocular Movements: Extraocular movements intact.      Pupils: Pupils are equal, round, and reactive to light.   Cardiovascular:      Rate and Rhythm: Regular rhythm.      Pulses: Normal pulses.   Pulmonary:      Effort: No respiratory distress.   Abdominal:      General: Abdomen is flat. Bowel sounds are normal.   Skin:     General: Skin is dry.   Neurological:      Mental Status: She is alert.      Comments: GCS 15. Dysarthric and expressive aphasia. Right facial droop. Full  strength on the left. No response to nox stim in the RUE or RLE.         Fluids  No intake or output data in the 24 hours ending 12/14/21 2879    Laboratory  Recent Results (from the past 48 hour(s))   POCT glucose device results    Collection Time: 12/14/21  5:18 AM   Result Value Ref Range    Glucose - Accu-Ck 101 (H) 65 - 99 mg/dL   CBC WITH DIFFERENTIAL    Collection Time: 12/14/21  5:20 AM   Result Value Ref Range    WBC 8.6 4.8 - 10.8 K/uL    RBC 5.09 4.20 - 5.40 M/uL    Hemoglobin 15.3 12.0 - 16.0 g/dL    Hematocrit 46.0 37.0 - 47.0 %    MCV 90.4 81.4 - 97.8 fL    MCH 30.1 27.0 - 33.0 pg    MCHC 33.3 (L) 33.6 - 35.0 g/dL    RDW 44.2 35.9 - 50.0 fL    Platelet Count 190 164 - 446 K/uL    MPV 10.9 9.0 - 12.9 fL    Neutrophils-Polys 39.70 (L) 44.00 - 72.00 %    Lymphocytes 42.20 (H) 22.00 - 41.00 %    Monocytes 7.90 0.00 - 13.40 %    Eosinophils 9.30 (H) 0.00 - 6.90 %    Basophils 0.80 0.00 - 1.80 %    Immature Granulocytes 0.10 0.00 - 0.90 %    Nucleated RBC 0.00 /100 WBC    Neutrophils (Absolute) 3.42 2.00 - 7.15 K/uL    Lymphs (Absolute) 3.63 1.00 - 4.80 K/uL    Monos (Absolute) 0.68 0.00 - 0.85 K/uL    Eos (Absolute) 0.80 (H) 0.00 - 0.51 K/uL    Baso (Absolute) 0.07 0.00 - 0.12 K/uL    Immature Granulocytes (abs) 0.01 0.00 - 0.11 K/uL    NRBC (Absolute) 0.00 K/uL   COMP METABOLIC PANEL    Collection Time: 12/14/21  5:20 AM   Result Value Ref Range    Sodium 142 135 - 145 mmol/L    Potassium 3.6 3.6 - 5.5 mmol/L    Chloride 106 96 - 112 mmol/L    Co2 22 20 - 33 mmol/L    Anion Gap 14.0 7.0 - 16.0    Glucose 98 65 - 99 mg/dL    Bun 14 8 - 22 mg/dL    Creatinine 0.65 0.50 - 1.40 mg/dL    Calcium 9.4 8.4 - 10.2 mg/dL    AST(SGOT) 45 12 - 45 U/L    ALT(SGPT) 63 (H) 2 - 50 U/L    Alkaline Phosphatase 112 (H) 30 - 99 U/L    Total Bilirubin 0.4 0.1 - 1.5 mg/dL    Albumin 4.2 3.2 - 4.9 g/dL    Total Protein 7.7 6.0 - 8.2 g/dL    Globulin 3.5 1.9 - 3.5 g/dL    A-G Ratio 1.2 g/dL   TROPONIN    Collection Time: 12/14/21   5:20 AM   Result Value Ref Range    Troponin T <6 6 - 19 ng/L   PROTHROMBIN TIME    Collection Time: 21  5:20 AM   Result Value Ref Range    PT 11.9 (L) 12.0 - 14.6 sec    INR 0.95 0.87 - 1.13   APTT    Collection Time: 21  5:20 AM   Result Value Ref Range    APTT 29.6 24.7 - 36.0 sec   ESTIMATED GFR    Collection Time: 21  5:20 AM   Result Value Ref Range    GFR If African American >60 >60 mL/min/1.73 m 2    GFR If Non African American >60 >60 mL/min/1.73 m 2   EKG (NOW)    Collection Time: 21  5:20 AM   Result Value Ref Range    Report       [facility] Emergency Dept.    Test Date:  2021  Pt Name:    BRITTANY COUCH             Department: [facility]  MRN:        [id number]                      Room:  Gender:     Female                       Technician: [id number]  :        1965                   Requested By:CHERYL MELCHOR  Order #:    [id number]                    Reading MD:    Measurements  Intervals                                Axis  Rate:       70                           P:          57  TX:         162                          QRS:        94  QRSD:       92                           T:          8  QT:         407  QTc:        440    Interpretive Statements  Sinus rhythm  Probable left atrial enlargement  Consider right ventricular hypertrophy  No previous ECG available for comparison         Imaging  CT-HEAD W/O    (Results Pending)   DX-ABDOMEN FOR TUBE PLACEMENT    (Results Pending)       Assessment/Plan  Intracranial hemorrhage (HCC)- (present on admission)  Assessment & Plan  ICH score: 1  Etiology:Likely HTN  Neuro checks and VS per protocol  SBP Goal <140mmHg; hydralazine, labetalol and nicardipine PRN  Repeat CT this afternoon  seizure, aspiration, and fall precautions   NPO, cortrak placement as needed for enteral access  SLP/PT/OT evaluation  DVT PPX: SCDs  Glucose control to maintain <180             Discussed patient condition and risk of  morbidity and/or mortality with RN, Pharmacy, Code status disscussed, Charge nurse / hot rounds and Patient.    The patient remains critically ill.  Critical care time = 38 minutes in directly providing and coordinating critical care and extensive data review.  No time overlap and excludes procedures.

## 2021-12-14 NOTE — ED NOTES
Report given to ROSALIND Castaneda at [facility]. Patient admitted to [facility] Room 100. Number for report [phone].

## 2021-12-14 NOTE — ASSESSMENT & PLAN NOTE
ICH score: 1  Etiology:Likely HTN  Neuro checks and VS per protocol  SBP Goal <140mmHg; hydralazine, labetalol and nicardipine PRN  Repeat CT this afternoon  seizure, aspiration, and fall precautions   NPO, cortrak placement as needed for enteral access  SLP/PT/OT evaluation  DVT PPX: SCDs  Glucose control to maintain <180

## 2021-12-14 NOTE — PROGRESS NOTES
"Daily Progress Note ICU Resident      Date of Service: 12/14/2021  Attending: Dr. Andriy Collazo   Senior Resident: Dr. Menendez    Brief HPI: \"56 y.o. female who presented 12/14/2021 with acute onset r-sided weakness and found to have a left BG ICH. She was tx to [facility] from  for further care in the ICU. Upon presentation her SBP was noted to be > 180.\"    Interval update:  Patient with unknown medical history and not on any medications at home.  She has not seen a doctor in a long time per her .  She continues to be aphasic and dysarthric and unable to lift her right upper or lower extremity off the bed.  Left upper and lower extremity strength and sensation intact.  Decreased sensation in the right upper and lower extremities and right-sided facial droop.    Plan:  Continue with hypertonic saline with goal sodium of 145-155  Obtain repeat CT head without contrast to assess for any evolvement of intracranial bleed.  Every 6 sodium checks.  Replete electrolytes and trend.  PT/OT/speech therapy evaluations.  Aim for early mobilization if head imaging remains stable.  Continue 3% hypertonic saline and titrate to goal sodium.  Nicardipine drip with SBP goal of 100-140.  Did not pass swallow assessment per speech and we will place core track for ongoing medications and tube feeding until patient can be reassessed.  Every hour neurochecks.  Stat CT head if any neurological decline.  Mechanical DVT prophylaxis only with SCDs.  Aim for normothermia and euvolemia.    Consultants/Specialty:  ICU  Neurology    Review of Systems:    Review of Systems   Unable to perform ROS: Acuity of condition       Objective Data:   Physical Exam:   Vitals:   Temp:  [35.9 °C (96.7 °F)-36.7 °C (98.1 °F)] 36.1 °C (97 °F)  Pulse:  [66-86] 76  Resp:  [17-66] 37  BP: (110-188)/() 127/59  SpO2:  [90 %-98 %] 96 %     Physical Exam  Constitutional:       General: She is not in acute distress.     Appearance: She is normal weight. She is " not ill-appearing, toxic-appearing or diaphoretic.   HENT:      Head: Normocephalic and atraumatic.      Mouth/Throat:      Pharynx: Oropharynx is clear.   Eyes:      General: No scleral icterus.     Extraocular Movements: Extraocular movements intact.      Conjunctiva/sclera: Conjunctivae normal.      Pupils: Pupils are equal, round, and reactive to light.   Cardiovascular:      Rate and Rhythm: Normal rate and regular rhythm.      Pulses: Normal pulses.      Heart sounds: Normal heart sounds. No murmur heard.      Pulmonary:      Effort: Pulmonary effort is normal. No respiratory distress.      Breath sounds: Normal breath sounds.   Abdominal:      General: Abdomen is flat. Bowel sounds are normal. There is no distension.      Palpations: Abdomen is soft.      Tenderness: There is no abdominal tenderness. There is no guarding or rebound.   Musculoskeletal:         General: Normal range of motion.      Cervical back: Normal range of motion and neck supple.      Right lower leg: No edema.      Left lower leg: No edema.   Skin:     General: Skin is warm and dry.      Coloration: Skin is not jaundiced.      Findings: No lesion or rash.   Neurological:      Sensory: Sensory deficit present.      Motor: Weakness present.      Comments: Dysarthric and expressive aphasia  Right facial droop  Full strength on the left  1/5 right upper and 1/5 right lower extremity   Withdraws to noxious stimuli right side only   Loss of sensation R upper extremity and decreased in the R lower extremity        Psychiatric:         Mood and Affect: Mood normal.         Behavior: Behavior normal.       Labs:   Recent Labs     12/14/21  0520   WBC 8.6   RBC 5.09   HEMOGLOBIN 15.3   HEMATOCRIT 46.0   MCV 90.4   MCH 30.1   RDW 44.2   PLATELETCT 190   MPV 10.9   NEUTSPOLYS 39.70*   LYMPHOCYTES 42.20*   MONOCYTES 7.90   EOSINOPHILS 9.30*   BASOPHILS 0.80     Recent Labs     12/14/21  0520   SODIUM 142   POTASSIUM 3.6   CHLORIDE 106   CO2 22  "  GLUCOSE 98   BUN 14       Imaging:   DX-ABDOMEN FOR TUBE PLACEMENT   Final Result      Feeding tube tip at the gastric fundus.      CT-HEAD W/O    (Results Pending)       Problem Representation:   \" \"56 y.o. female who presented 12/14/2021 with acute onset r-sided weakness and found to have a left BG ICH. She was tx to [facility] from  for further care in the ICU. Upon presentation her SBP was noted to be > 180.\"    Intracranial hemorrhage (HCC)- (present on admission)  Assessment & Plan  ICH score: 1  Etiology:Likely HTN, was hypertensive on presentation and not seen a doctor in a long time   Neuro checks and VS per protocol  SBP Goal 100-140mmHg on nicardipine drip   Repeat CT this afternoon  seizure, aspiration, and fall precautions   NPO, cortrak placement as needed for enteral access  SLP/PT/OT evaluation  DVT PPX: SCDs  Glucose control to maintain 140-180  PT/OT and continuing speech therapy when appropriate    Replete electrolytes   Na goal 145-155  3% NS for edema    Ricky Menendez M.D.            "

## 2021-12-14 NOTE — ED PROVIDER NOTES
ED Provider Note    CHIEF COMPLAINT  Chief Complaint   Patient presents with   • Possible Stroke     Last known normal 4:45 AM. Spouse reports that she moved the car and he left to go to work. She was normal baseline at this point. Daughter called  that patient was having a stroke. Patient has right sided facial droop and right sided weakness.  and patient denies falls recently. Denies blood thinners.        HPI  Maryjane Gray is a 56 y.o. female who presents to the emergency department with right facial droop and right upper and lower extremity weakness. Past medical history benign but no PCP and no recent medical evaluations. Not on any current medications. No blood thinners. This morning was assisting her  clear the driveway before he left for work. He saw her around 445 and at that point she was normal. Reportedly after getting to work he was contacted by the daughter which also lives with them stating that she thought that the patient was having a stroke and therefore he turned around went home and found the above findings of the wife. He then emergently brought her here to the hospital for further care. Currently the patient is denying any complaint. Although history is limited secondary to her current clinical condition.    REVIEW OF SYSTEMS  See HPI for further details. All other systems are negative.     PAST MEDICAL HISTORY       SOCIAL HISTORY  Social History     Tobacco Use   • Smoking status: Never Smoker   • Smokeless tobacco: Never Used   Vaping Use   • Vaping Use: Never used   Substance and Sexual Activity   • Alcohol use: No   • Drug use: No   • Sexual activity: Not on file       SURGICAL HISTORY   has a past surgical history that includes abdominal hysterectomy total.    CURRENT MEDICATIONS  Home Medications     Reviewed by Dk Walker R.N. (Registered Nurse) on 12/14/21 at 0537  Med List Status: Not Addressed   Medication Last Dose Status        Patient Oracio Taking any  "Medications                       ALLERGIES  No Known Allergies    PHYSICAL EXAM  VITAL SIGNS: /86   Pulse 69   Temp 36.7 °C (98.1 °F) (Temporal)   Resp 17   Ht 1.626 m (5' 4\")   Wt 65.8 kg (145 lb)   SpO2 95%   BMI 24.89 kg/m²  @CRISTINA[[id number]::@   Pulse ox interpretation: I interpret this pulse ox as normal.  Constitutional: Alert in no apparent distress.  HENT: No signs of trauma, Bilateral external ears normal, Nose normal.   Eyes: Pupils are equal and reactive  Neck: Normal range of motion, No tenderness, Supple   Cardiovascular: Regular rate and rhythm, no murmurs.   Thorax & Lungs: Normal breath sounds, No respiratory distress, No wheezing, No chest tenderness.   Abdomen: Bowel sounds normal, Soft, No tenderness  Skin: Warm, Dry, No erythema, No rash.   Extremities: Intact distal pulses.   Musculoskeletal: Good range of motion in all major joints. No tenderness to palpation or major deformities noted.   Neurologic: Alert , Normal motor function, Normal sensory function, No focal deficits noted.   Psychiatric: Affect normal, Judgment normal, Mood normal.       DIAGNOSTIC STUDIES / PROCEDURES      LABS  Results for orders placed or performed during the hospital encounter of 12/14/21   CBC WITH DIFFERENTIAL   Result Value Ref Range    WBC 8.6 4.8 - 10.8 K/uL    RBC 5.09 4.20 - 5.40 M/uL    Hemoglobin 15.3 12.0 - 16.0 g/dL    Hematocrit 46.0 37.0 - 47.0 %    MCV 90.4 81.4 - 97.8 fL    MCH 30.1 27.0 - 33.0 pg    MCHC 33.3 (L) 33.6 - 35.0 g/dL    RDW 44.2 35.9 - 50.0 fL    Platelet Count 190 164 - 446 K/uL    MPV 10.9 9.0 - 12.9 fL    Neutrophils-Polys 39.70 (L) 44.00 - 72.00 %    Lymphocytes 42.20 (H) 22.00 - 41.00 %    Monocytes 7.90 0.00 - 13.40 %    Eosinophils 9.30 (H) 0.00 - 6.90 %    Basophils 0.80 0.00 - 1.80 %    Immature Granulocytes 0.10 0.00 - 0.90 %    Nucleated RBC 0.00 /100 WBC    Neutrophils (Absolute) 3.42 2.00 - 7.15 K/uL    Lymphs (Absolute) 3.63 1.00 - 4.80 K/uL    Monos (Absolute) 0.68 " 0.00 - 0.85 K/uL    Eos (Absolute) 0.80 (H) 0.00 - 0.51 K/uL    Baso (Absolute) 0.07 0.00 - 0.12 K/uL    Immature Granulocytes (abs) 0.01 0.00 - 0.11 K/uL    NRBC (Absolute) 0.00 K/uL   COMP METABOLIC PANEL   Result Value Ref Range    Sodium 142 135 - 145 mmol/L    Potassium 3.6 3.6 - 5.5 mmol/L    Chloride 106 96 - 112 mmol/L    Co2 22 20 - 33 mmol/L    Anion Gap 14.0 7.0 - 16.0    Glucose 98 65 - 99 mg/dL    Bun 14 8 - 22 mg/dL    Creatinine 0.65 0.50 - 1.40 mg/dL    Calcium 9.4 8.4 - 10.2 mg/dL    AST(SGOT) 45 12 - 45 U/L    ALT(SGPT) 63 (H) 2 - 50 U/L    Alkaline Phosphatase 112 (H) 30 - 99 U/L    Total Bilirubin 0.4 0.1 - 1.5 mg/dL    Albumin 4.2 3.2 - 4.9 g/dL    Total Protein 7.7 6.0 - 8.2 g/dL    Globulin 3.5 1.9 - 3.5 g/dL    A-G Ratio 1.2 g/dL   TROPONIN   Result Value Ref Range    Troponin T <6 6 - 19 ng/L   PROTHROMBIN TIME   Result Value Ref Range    PT 11.9 (L) 12.0 - 14.6 sec    INR 0.95 0.87 - 1.13   APTT   Result Value Ref Range    APTT 29.6 24.7 - 36.0 sec   ESTIMATED GFR   Result Value Ref Range    GFR If African American >60 >60 mL/min/1.73 m 2    GFR If Non African American >60 >60 mL/min/1.73 m 2   EKG (NOW)   Result Value Ref Range    Report       [facility] Emergency Dept.    Test Date:  2021  Pt Name:    BRITTANY COUCH             Department: [facility]  MRN:        [id number]                      Room:  Gender:     Female                       Technician: [id number]  :        1965                   Requested By:CHERYL MELCHOR  Order #:    [id number]                    Reading MD:    Measurements  Intervals                                Axis  Rate:       70                           P:          57  OR:         162                          QRS:        94  QRSD:       92                           T:          8  QT:         407  QTc:        440    Interpretive Statements  Sinus rhythm  Probable left atrial enlargement  Consider right ventricular hypertrophy  No  previous ECG available for comparison     POCT glucose device results   Result Value Ref Range    Glucose - Accu-Ck 101 (H) 65 - 99 mg/dL         RADIOLOGY  CT-HEAD W/O   Final Result         1.  Left basal ganglia hemorrhage with surrounding mild vasogenic edema   2.  Mass effect with partial effacement of the left ankle      These findings were discussed with the patient's clinician, Chintan Hilario, on 12/14/2021 5:41 AM.          CRITICAL CARE  The very real possibilty of a deterioration of this patient's condition required the highest level of my preparedness for sudden, emergent intervention.  I provided critical care services, which included medication orders, frequent reevaluations of the patient's condition and response to treatment, ordering and reviewing test results, and discussing the case with various consultants.  The critical care time associated with the care of the patient was 35 minutes. Review chart for interventions. This time is exclusive of any other billable procedures.       COURSE & MEDICAL DECISION MAKING  Pertinent Labs & Imaging studies reviewed. (See chart for details)  48-year-old female presented to the emergency department with right-sided facial droop and right upper and lower extremity weakness. Concern for stroke. Emergency CT imaging showing a left intraparenchymal hemorrhage. Patient is not on blood thinners. She has hypertensive. Started on the card pain drip to target systolic blood pressure of 140. I discussed the case with neurology Dr. Taylor and then Dr. Damon with the ICU which accepts. At this point arranging emergent ground ambulance transfer to the [facility] for further ongoing inpatient care. Patient will be kept NPO. Had a bet elevated.    FINAL IMPRESSION  1. Intraparenchymal hemorrhage of brain (HCC)    2. Hypertension, unspecified type            Electronically signed by: Chintan Hilario M.D., 12/14/2021 5:29 AM

## 2021-12-14 NOTE — CONSULTS
Neurology Initial Consult H&P  Neurohospitalist Service, [facility]    Referring Physician: Elvis Damon M.D.    R side weakness    HPI: Maryjane Gray is a 56 y.o. woman with unknown past medical history who presented to [facility] ER after abrupt onset right-sided weakness.  Her last seen normal was around 4 AM per documentation.  At the outside ER she had a CT head revealing left basal ganglia hemorrhage, prompting contact for transfer to [facility] for ICU care.  Blood pressure was reportedly greater than 180 in the outside hospital.    Patient is currently aphasic and is not able to contribute to her history, so history is obtained from limited documentation.    Review of systems: In addition to what is detailed in the HPI above, all other systems reviewed and are negative.    Past Medical History:    has no past medical history on file.  Unable to obtain due to patient condition    FHx:  family history is not on file.  Unable to obtain due to patient condition    SHx:  Unable to obtain due to patient condition    Allergies:  No Known Allergies    Medications:    Current Facility-Administered Medications:   •  3% sodium chloride (HYPERTONIC SALINE) 500mL infusion, , Intravenous, Continuous, Andriy Collazo M.D., Last Rate: 20 mL/hr at 12/14/21 0821, New Bag at 12/14/21 0821  •  niCARdipine (CARDENE) 25 mg in  mL Infusion, 0-15 mg/hr, Intravenous, Continuous, Ricky Menendez M.D., Last Rate: 75 mL/hr at 12/14/21 0900, 7.5 mg/hr at 12/14/21 0900  •  Respiratory Therapy Consult, , Nebulization, Continuous RT, Tootie Persaud, A.P.R.N.  •  LORazepam (ATIVAN) injection 2 mg, 2 mg, Intravenous, Q5 MIN PRN, Tootie Persaud, A.P.R.N.  •  acetaminophen (Tylenol) tablet 650 mg, 650 mg, Oral, Q4HRS PRN **OR** acetaminophen (TYLENOL) suppository 650 mg, 650 mg, Rectal, Q4HRS PRN, Tootie Persaud, A.P.R.N.  •  ondansetron (ZOFRAN ODT) dispertab 4 mg, 4 mg, Oral, Q4HRS PRN **OR**  ondansetron (ZOFRAN) syringe/vial injection 4 mg, 4 mg, Intravenous, Q4HRS PRN, SARAH Glass  •  MD Alert...ICU Electrolyte Replacement per Pharmacy, , Other, PHARMACY TO DOSE, MARIA E Glass.  •  potassium chloride (KCL) ivpb 10 mEq, 10 mEq, Intravenous, Q HOUR, Andiry Collazo M.D.  •  senna-docusate (PERICOLACE or SENOKOT S) 8.6-50 MG per tablet 2 Tablet, 2 Tablet, Oral, BID **AND** polyethylene glycol/lytes (MIRALAX) PACKET 1 Packet, 1 Packet, Oral, QDAY PRN **AND** magnesium hydroxide (MILK OF MAGNESIA) suspension 30 mL, 30 mL, Oral, QDAY PRN **AND** bisacodyl (DULCOLAX) suppository 10 mg, 10 mg, Rectal, QDAY PRN, LEE GlassRSHELLEY  •  Pharmacy Consult: Enteral tube insertion - review meds/change route/product selection, 1 Each, Other, PHARMACY TO DOSE, Ricky Menendez M.D.    Physical Examination:     General: Patient is awake and in no acute distress  Eye: Examination of optic disks not indicated at this time given acuity of consult  Neck: There is normal range of motion  CV: regular rate   Extremities:  clear, dry, intact, without peripheral edema    NEUROLOGICAL EXAM:     /55   Pulse 79   Temp 35.9 °C (96.7 °F) (Temporal)   Resp (!) 49   Wt 65.8 kg (145 lb 1 oz)   SpO2 94%   BMI 24.90 kg/m²       Mental status: Awake, alert.  Does not answer orientation questions.  Speech and language: Mumbles some incoherent speech with reduced output.  Does not follow commands   Cranial nerve exam: Pupils are equal, round and reactive to light bilaterally.  Bilateral blink to threat.  There is no nystagmus. Extraocular muscles are intact.  Right facial droop. Sensation in the face is intact to light touch. Palate elevates symmetrically. Tongue is midline.  Motor exam: Power 0/5 in right upper and lower extremities, otherwise 5/5. No abnormal movements were seen on exam.  Sensory exam: Reacts to tactile and pinprick in all 4 extremities, no neglect to double stim   Deep tendon  reflexes:  2+ throughout. Toes down-going bilaterally.  Coordination: No ataxia on bilateral finger-to-nose testing  Gait: Deferred due to patient preference    NIHSS: National Institutes of Health Stroke Scale    [0] 1a:Level of Consciousness    0-alert 1-drowsy   2-stupor   3-coma  [2] 1b:LOC Questions                  0-both  1-one      2-neither  [2] 1c:LOC Commands                   0-both  1-one      2-neither  [0] 2: Best Gaze                     0-nl    1-partial  2-forced  [0] 3: Visual Fields                   0-nl    1-partial  2-complete 3-bilat  [2] 4: Facial Paresis                0-nl    1-minor    2-partial  3-full  MOTOR                       0-nl  [4] 5: Right Arm           1-drift  [0] 6: Left Arm             2-some effort vs gravity  [4] 7: Right Leg           3-no effort vs gravity  [0] 8: Left Leg             4-no movement                             x-untestable  [0] 9: Limb Ataxia                    0-abs   1-1_limb   2-2+_limbs       x-untestable  [0] 10:Sensory                        0-nl    1-partial  2-dense  [2] 11:Best Language/Aphasia         0-nl    1-mild/mod 2-severe   3-mute  [0] 12:Dysarthria                     0-nl    1-mild/mod 2-severe       x-untestable  [0] 13:Neglect/Inattention            0-none  1-partial  2-complete  [16] TOTAL    Objective Data:    Labs:  Lab Results   Component Value Date/Time    PROTHROMBTM 11.9 (L) 12/14/2021 05:20 AM    INR 0.95 12/14/2021 05:20 AM      Lab Results   Component Value Date/Time    WBC 8.6 12/14/2021 05:20 AM    RBC 5.09 12/14/2021 05:20 AM    HEMOGLOBIN 15.3 12/14/2021 05:20 AM    HEMATOCRIT 46.0 12/14/2021 05:20 AM    MCV 90.4 12/14/2021 05:20 AM    MCH 30.1 12/14/2021 05:20 AM    MCHC 33.3 (L) 12/14/2021 05:20 AM    MPV 10.9 12/14/2021 05:20 AM    NEUTSPOLYS 39.70 (L) 12/14/2021 05:20 AM    LYMPHOCYTES 42.20 (H) 12/14/2021 05:20 AM    MONOCYTES 7.90 12/14/2021 05:20 AM    EOSINOPHILS 9.30 (H) 12/14/2021 05:20 AM    BASOPHILS 0.80  12/14/2021 05:20 AM      Lab Results   Component Value Date/Time    SODIUM 142 12/14/2021 05:20 AM    POTASSIUM 3.6 12/14/2021 05:20 AM    CHLORIDE 106 12/14/2021 05:20 AM    CO2 22 12/14/2021 05:20 AM    GLUCOSE 98 12/14/2021 05:20 AM    BUN 14 12/14/2021 05:20 AM    CREATININE 0.65 12/14/2021 05:20 AM      Lab Results   Component Value Date/Time    CHOLSTRLTOT 207 (H) 12/05/2015 07:44 AM     (H) 12/05/2015 07:44 AM    HDL 39 (A) 12/05/2015 07:44 AM    TRIGLYCERIDE 230 (H) 12/05/2015 07:44 AM       Lab Results   Component Value Date/Time    ALKPHOSPHAT 112 (H) 12/14/2021 05:20 AM    ASTSGOT 45 12/14/2021 05:20 AM    ALTSGPT 63 (H) 12/14/2021 05:20 AM    TBILIRUBIN 0.4 12/14/2021 05:20 AM        Imaging/Testing:    I interpreted and/or reviewed the patient's neuroimaging    CT-HEAD W/O    (Results Pending)       Assessment and Plan:    Maryjane Gray is a 56 y.o. woman with unknown past medical history who presents with right-sided weakness and aphasia in the setting of a 3 to 5 cc left subcortical hemorrhage.  The hemorrhage appears to be hypertensive in nature.  I am unclear why she has cortical signs to include aphasia, but there may be some cortical dysfunction due to the bleeds proximity to the cortex.  I reviewed CT head.  There does not appear to be significant mass-effect or edema.    Problem list:  1.  Left basal ganglia hemorrhage, likely hypertensive    PLAN:  - Admit to ICU for every hour neurochecks and vitals.  -Strict BP control less than 160/90. Nicardipine drip.  -Stat CT head if any neurologic decline.  -Hold any anticoagulant and antiplatelet medications.  Mechanical DVT prophylaxis only.  -Patient is currently on 3% hypertonic saline.  I do not feel that this is necessarily warranted at this time given minimal edema and small size of the hemorrhage.  - PT/OT/SLP. NG tube if does not pass bedside swallow.  - Goal of normothermia, euvolemia.     INTRACEREBRAL HEMORRHAGE SCORE:    GCS  SCORE:     Eyes - 4 - Open Spontaneously  Motor - 5 - Localizes to pain  Verbal - 2 - Incomprehensible speech    GCS Total - 11     ICH SCORE:  GCS 5-12 [1]  Age < 80 - [0]  ICH Volume <30cc [0]  Intraventricular? No [0]  Infratentorial? No [0]    ICH TOTAL SCORE: 1    The evaluation of the patient, and recommended management, was discussed with the resident staff.     Richard Simons D.O.  Neurohospitalist, Acute Care Services

## 2021-12-14 NOTE — CONSULTS
"    Physical Medicine and Rehabilitation Consultation              Date of initial consultation: 12/14/2021  Requesting provider: RAFAELA Deluna   Consulting provider: Cherelle Schmidt D.O.  Reason for consultation: assess for acute inpatient rehab appropriateness  LOS: 0 Day(s)    Chief complaint: Right-sided weakness    HPI: The patient is a 56 y.o.  female with unknown past medical history (patient aphasic and unable to list PMHx);  who presented on 12/14/2021  7:18 AM with acute onset right-sided weakness.  Per documentation, patient originally presented to [facility] emergency department after abrupt onset right-sided weakness.  Upon presentation to [facility] reportedly patient's blood pressure was > 180 at [facility] CT head was obtained which showed a 3 to 5 cm left subcortical hemorrhage land patient was transferred to [facility] for higher level of care.  Neurology was consulted, etiology of hemorrhage appeared to be hypertensive in nature.  Suspected aphasia was secondary to hemorrhage proximity to the cortex.  CT head images reviewed by neurology and there is no significant mass-effect or edema.  Patient was admitted to the ICU, placed on nicardipine drip with BP goals of less than 160/9.  Repeat CT head is pending, PT/OT evaluations pending.  Patient has been seen by SLP and is currently n.p.o. with core track in place.    ROS limited due to patient's dysarthria and word finding difficulty. Comprehension intact. Patient is able to tell me she is tired.     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  Tobacco: denies  Alcohol: denies   Drugs: denies     THERAPY:  Restrictions: Fall risk  PT: Functional mobility   PT note " pending    OT: ADLs  OT note pending    SLP:    SLP note: N.p.o., core track in place on tube feeds    IMAGIN/14 CT head  IMPRESSION:        1.  Left basal ganglia hemorrhage with surrounding mild vasogenic edema  2.  Mass effect with partial effacement of the left ankle      PROCEDURES:  None    PMH:  No past medical history on file.    PSH:  Past Surgical History:   Procedure Laterality Date   • ABDOMINAL HYSTERECTOMY TOTAL         FHX:  No family history on file.    Medications:  Current Facility-Administered Medications   Medication Dose   • 3% sodium chloride (HYPERTONIC SALINE) 500mL infusion     • niCARdipine (CARDENE) 25 mg in  mL Infusion  0-15 mg/hr   • Respiratory Therapy Consult     • LORazepam (ATIVAN) injection 2 mg  2 mg   • acetaminophen (Tylenol) tablet 650 mg  650 mg    Or   • acetaminophen (TYLENOL) suppository 650 mg  650 mg   • ondansetron (ZOFRAN ODT) dispertab 4 mg  4 mg    Or   • ondansetron (ZOFRAN) syringe/vial injection 4 mg  4 mg   • MD Alert...ICU Electrolyte Replacement per Pharmacy     • potassium chloride (KCL) ivpb 10 mEq  10 mEq   • senna-docusate (PERICOLACE or SENOKOT S) 8.6-50 MG per tablet 2 Tablet  2 Tablet    And   • polyethylene glycol/lytes (MIRALAX) PACKET 1 Packet  1 Packet    And   • magnesium hydroxide (MILK OF MAGNESIA) suspension 30 mL  30 mL    And   • bisacodyl (DULCOLAX) suppository 10 mg  10 mg       Allergies:  No Known Allergies      Physical Exam:  Vitals: /55   Pulse 79   Temp 35.9 °C (96.7 °F) (Temporal)   Resp (!) 49   Wt 65.8 kg (145 lb 1 oz)   SpO2 94%   Gen: NAD, laying comfortably in ICU bed, no family at side   Head:  NC/AT  Eyes/ Nose/ Mouth: PERRLA, moist mucous membranes, Cortrak in place   Cardio: RRR, good distal perfusion, warm extremities  Pulm: normal respiratory effort, no cyanosis , no witnessed wheezes or coughing   Abd: Soft NTND, negative borborygmi   Ext: No peripheral edema. No calf tenderness. No  clubbing.    Mental status: answers questions appropriately follows commands, oriented to self, in order to state month patient says all months in consecutive order, knows the date and year   Speech: + non fluent, + dysarthria + word finding difficulty + comprehension intact     CRANIAL NERVES:  2,3: visual acuity grossly intact, PERRL  3,4,6: EOMI bilaterally, no nystagmus or diplopia  5: sensation intact to light touch bilaterally and symmetric  7: no facial asymmetry  8: hearing grossly intact  9,10: symmetric palate elevation  11: SCM/Trapezius strength 5/5 bilaterally    Motor:      Upper Extremity  Myotome R L   Shoulder flexion C5 2/5 5/5   Elbow flexion C5 0/5 5/5   Wrist extension C6 0/5 5/5   Elbow extension C7 1/5 5/5   Finger flexion C8 1/5 5/5   Finger abduction T1 0/5 5/5     Lower Extremity Myotome R L   Hip flexion L2 4/5 5/5   Knee extension L3 5/5 5/5   Ankle dorsiflexion L4 5/5 5/5   Toe extension L5 5/5 5/5   Ankle plantarflexion S1 5/5 5/5       Sensory:   intact to light touch through out LUE and LLE, patient reports decreased senation in R UE     DTRs: 2+ in bilateral  biceps,   No clonus at bilateral ankles  Negative babinski b/l  Negative Duarte b/l     Tone: no spasticity noted, no cogwheeling noted    Coordination:   intact finger to nose bilaterally LUE   intact fine motor with fingers LUE      Labs: Reviewed and significant for   Recent Labs     12/14/21  0520   RBC 5.09   HEMOGLOBIN 15.3   HEMATOCRIT 46.0   PLATELETCT 190   PROTHROMBTM 11.9*   APTT 29.6   INR 0.95     Recent Labs     12/14/21  0520   SODIUM 142   POTASSIUM 3.6   CHLORIDE 106   CO2 22   GLUCOSE 98   BUN 14   CREATININE 0.65   CALCIUM 9.4     Recent Results (from the past 24 hour(s))   POCT glucose device results    Collection Time: 12/14/21  5:18 AM   Result Value Ref Range    Glucose - Accu-Ck 101 (H) 65 - 99 mg/dL   CBC WITH DIFFERENTIAL    Collection Time: 12/14/21  5:20 AM   Result Value Ref Range    WBC 8.6 4.8 -  10.8 K/uL    RBC 5.09 4.20 - 5.40 M/uL    Hemoglobin 15.3 12.0 - 16.0 g/dL    Hematocrit 46.0 37.0 - 47.0 %    MCV 90.4 81.4 - 97.8 fL    MCH 30.1 27.0 - 33.0 pg    MCHC 33.3 (L) 33.6 - 35.0 g/dL    RDW 44.2 35.9 - 50.0 fL    Platelet Count 190 164 - 446 K/uL    MPV 10.9 9.0 - 12.9 fL    Neutrophils-Polys 39.70 (L) 44.00 - 72.00 %    Lymphocytes 42.20 (H) 22.00 - 41.00 %    Monocytes 7.90 0.00 - 13.40 %    Eosinophils 9.30 (H) 0.00 - 6.90 %    Basophils 0.80 0.00 - 1.80 %    Immature Granulocytes 0.10 0.00 - 0.90 %    Nucleated RBC 0.00 /100 WBC    Neutrophils (Absolute) 3.42 2.00 - 7.15 K/uL    Lymphs (Absolute) 3.63 1.00 - 4.80 K/uL    Monos (Absolute) 0.68 0.00 - 0.85 K/uL    Eos (Absolute) 0.80 (H) 0.00 - 0.51 K/uL    Baso (Absolute) 0.07 0.00 - 0.12 K/uL    Immature Granulocytes (abs) 0.01 0.00 - 0.11 K/uL    NRBC (Absolute) 0.00 K/uL   COMP METABOLIC PANEL    Collection Time: 12/14/21  5:20 AM   Result Value Ref Range    Sodium 142 135 - 145 mmol/L    Potassium 3.6 3.6 - 5.5 mmol/L    Chloride 106 96 - 112 mmol/L    Co2 22 20 - 33 mmol/L    Anion Gap 14.0 7.0 - 16.0    Glucose 98 65 - 99 mg/dL    Bun 14 8 - 22 mg/dL    Creatinine 0.65 0.50 - 1.40 mg/dL    Calcium 9.4 8.4 - 10.2 mg/dL    AST(SGOT) 45 12 - 45 U/L    ALT(SGPT) 63 (H) 2 - 50 U/L    Alkaline Phosphatase 112 (H) 30 - 99 U/L    Total Bilirubin 0.4 0.1 - 1.5 mg/dL    Albumin 4.2 3.2 - 4.9 g/dL    Total Protein 7.7 6.0 - 8.2 g/dL    Globulin 3.5 1.9 - 3.5 g/dL    A-G Ratio 1.2 g/dL   TROPONIN    Collection Time: 12/14/21  5:20 AM   Result Value Ref Range    Troponin T <6 6 - 19 ng/L   PROTHROMBIN TIME    Collection Time: 12/14/21  5:20 AM   Result Value Ref Range    PT 11.9 (L) 12.0 - 14.6 sec    INR 0.95 0.87 - 1.13   APTT    Collection Time: 12/14/21  5:20 AM   Result Value Ref Range    APTT 29.6 24.7 - 36.0 sec   ESTIMATED GFR    Collection Time: 12/14/21  5:20 AM   Result Value Ref Range    GFR If African American >60 >60 mL/min/1.73 m 2    GFR If  Non African American >60 >60 mL/min/1.73 m 2   EKG (NOW)    Collection Time: 21  5:20 AM   Result Value Ref Range    Report       [facility] Emergency Dept.    Test Date:  2021  Pt Name:    BRITTANY COUCH             Department: [facility]  MRN:        [id number]                      Room:  Gender:     Female                       Technician: [id number]  :        1965                   Requested By:CHERYL MELCHOR  Order #:    [id number]                    Reading MD:    Measurements  Intervals                                Axis  Rate:       70                           P:          57  IN:         162                          QRS:        94  QRSD:       92                           T:          8  QT:         407  QTc:        440    Interpretive Statements  Sinus rhythm  Probable left atrial enlargement  Consider right ventricular hypertrophy  No previous ECG available for comparison           ASSESSMENT:  Patient is a 56 y.o. female admitted with aphasia and right-sided weakness found to have a left basal ganglia hemorrhage.    [facility] Code / Diagnosis to Support: 0001.2 - Stroke: Right Body Involvement (Left Brain)    Rehabilitation: Impaired ADLs and mobility  Therapy evals pending, anticipate patient will have adequate tolerance for PT/OT and be appropriate for IRF level therapy, see dispo details below.     Barriers to transfer include: Insurance authorization, TCCs to verify disposition, medical clearance and bed availability     Additional Recommendations:  Left basal ganglia hemorrhage  -Greatest impairments at this time are dysphagia and right-sided weakness with impaired mobility and ADLs,   -CT head with evidence of left basal ganglia hemorrhage suspected to be secondary to hypertension, goal SBP<140, suspected cortex involvement in hemorrhage causing expression and word finding impairment   -Currently on nicardipine drip for blood pressure control  -Repeat CT head  pending  -PT/OT evaluations are pending    Dysphagia  - currently NPO, with Cortrak  - will need long term nutrition plan confirmation prior to acceptance to IRF     Disposition  - Patient is currently functioning below her level of baseline, will need post acute rehab   - PT/OT evals pending, but anticipate adequate tolerance for IRF level therapy with 3hrs of therapy 5 days per week  - prior to acceptance to IRF level therapy will need insurance auth, PT/OT evals, and confirmation of family's ability to provide care.   - PM&R to continue to follow closely     Medical Complexity:  Left basal ganglia hemorrhage  Dysphagia  Right-sided weakness  Hypertension  Hyperlipidemia  Impaired mobility and ADLs          DVT PPX: SCDs      Thank you for allowing us to participate in the care of this patient.     Patient was seen for 88 minutes on unit/floor of which > 50% of time was spent on counseling and coordination of care regarding the above, including prognosis, risk reduction, benefits of treatment, and options for next stage of care.    Cherelle Schmidt D.O.   Physical Medicine and Rehabilitation     Please note that this dictation was created using voice recognition software. I have made every reasonable attempt to correct obvious errors, but there may be errors of grammar and possibly content that I did not discover before finalizing the note.

## 2021-12-15 ENCOUNTER — APPOINTMENT (OUTPATIENT)
Dept: RADIOLOGY | Facility: MEDICAL CENTER | Age: 56
DRG: 064 | End: 2021-12-15
Attending: NURSE PRACTITIONER
Payer: COMMERCIAL

## 2021-12-15 LAB
ALBUMIN SERPL BCP-MCNC: 3.8 G/DL (ref 3.2–4.9)
ALBUMIN/GLOB SERPL: 1.2 G/DL
ALP SERPL-CCNC: 95 U/L (ref 30–99)
ALT SERPL-CCNC: 57 U/L (ref 2–50)
ANION GAP SERPL CALC-SCNC: 10 MMOL/L (ref 7–16)
AST SERPL-CCNC: 44 U/L (ref 12–45)
BASOPHILS # BLD AUTO: 0.7 % (ref 0–1.8)
BASOPHILS # BLD: 0.06 K/UL (ref 0–0.12)
BILIRUB SERPL-MCNC: 0.5 MG/DL (ref 0.1–1.5)
BUN SERPL-MCNC: 11 MG/DL (ref 8–22)
CALCIUM SERPL-MCNC: 8.7 MG/DL (ref 8.5–10.5)
CHLORIDE SERPL-SCNC: 114 MMOL/L (ref 96–112)
CO2 SERPL-SCNC: 21 MMOL/L (ref 20–33)
CREAT SERPL-MCNC: 0.41 MG/DL (ref 0.5–1.4)
CRP SERPL HS-MCNC: <0.3 MG/DL (ref 0–0.75)
EOSINOPHIL # BLD AUTO: 0.16 K/UL (ref 0–0.51)
EOSINOPHIL NFR BLD: 1.7 % (ref 0–6.9)
ERYTHROCYTE [DISTWIDTH] IN BLOOD BY AUTOMATED COUNT: 47.1 FL (ref 35.9–50)
GLOBULIN SER CALC-MCNC: 3.2 G/DL (ref 1.9–3.5)
GLUCOSE SERPL-MCNC: 110 MG/DL (ref 65–99)
HCT VFR BLD AUTO: 41.7 % (ref 37–47)
HGB BLD-MCNC: 14.1 G/DL (ref 12–16)
IMM GRANULOCYTES # BLD AUTO: 0.03 K/UL (ref 0–0.11)
IMM GRANULOCYTES NFR BLD AUTO: 0.3 % (ref 0–0.9)
LYMPHOCYTES # BLD AUTO: 1.92 K/UL (ref 1–4.8)
LYMPHOCYTES NFR BLD: 20.9 % (ref 22–41)
MAGNESIUM SERPL-MCNC: 2.3 MG/DL (ref 1.5–2.5)
MCH RBC QN AUTO: 30.7 PG (ref 27–33)
MCHC RBC AUTO-ENTMCNC: 33.8 G/DL (ref 33.6–35)
MCV RBC AUTO: 90.8 FL (ref 81.4–97.8)
MONOCYTES # BLD AUTO: 0.65 K/UL (ref 0–0.85)
MONOCYTES NFR BLD AUTO: 7.1 % (ref 0–13.4)
NEUTROPHILS # BLD AUTO: 6.38 K/UL (ref 2–7.15)
NEUTROPHILS NFR BLD: 69.3 % (ref 44–72)
NRBC # BLD AUTO: 0 K/UL
NRBC BLD-RTO: 0 /100 WBC
PHOSPHATE SERPL-MCNC: 2.2 MG/DL (ref 2.5–4.5)
PLATELET # BLD AUTO: 143 K/UL (ref 164–446)
PMV BLD AUTO: 10.6 FL (ref 9–12.9)
POTASSIUM SERPL-SCNC: 3.9 MMOL/L (ref 3.6–5.5)
PROT SERPL-MCNC: 7 G/DL (ref 6–8.2)
RBC # BLD AUTO: 4.59 M/UL (ref 4.2–5.4)
SODIUM SERPL-SCNC: 143 MMOL/L (ref 135–145)
SODIUM SERPL-SCNC: 145 MMOL/L (ref 135–145)
SODIUM SERPL-SCNC: 147 MMOL/L (ref 135–145)
SODIUM SERPL-SCNC: 149 MMOL/L (ref 135–145)
WBC # BLD AUTO: 9.2 K/UL (ref 4.8–10.8)

## 2021-12-15 PROCEDURE — 99233 SBSQ HOSP IP/OBS HIGH 50: CPT | Performed by: STUDENT IN AN ORGANIZED HEALTH CARE EDUCATION/TRAINING PROGRAM

## 2021-12-15 PROCEDURE — 700101 HCHG RX REV CODE 250: Performed by: PSYCHIATRY & NEUROLOGY

## 2021-12-15 PROCEDURE — 770022 HCHG ROOM/CARE - ICU (200)

## 2021-12-15 PROCEDURE — 700101 HCHG RX REV CODE 250: Performed by: STUDENT IN AN ORGANIZED HEALTH CARE EDUCATION/TRAINING PROGRAM

## 2021-12-15 PROCEDURE — 84100 ASSAY OF PHOSPHORUS: CPT

## 2021-12-15 PROCEDURE — 97166 OT EVAL MOD COMPLEX 45 MIN: CPT

## 2021-12-15 PROCEDURE — 71045 X-RAY EXAM CHEST 1 VIEW: CPT

## 2021-12-15 PROCEDURE — 80053 COMPREHEN METABOLIC PANEL: CPT

## 2021-12-15 PROCEDURE — 02HV33Z INSERTION OF INFUSION DEVICE INTO SUPERIOR VENA CAVA, PERCUTANEOUS APPROACH: ICD-10-PCS | Performed by: INTERNAL MEDICINE

## 2021-12-15 PROCEDURE — 83735 ASSAY OF MAGNESIUM: CPT

## 2021-12-15 PROCEDURE — 97535 SELF CARE MNGMENT TRAINING: CPT

## 2021-12-15 PROCEDURE — 84295 ASSAY OF SERUM SODIUM: CPT

## 2021-12-15 PROCEDURE — 86140 C-REACTIVE PROTEIN: CPT

## 2021-12-15 PROCEDURE — A9270 NON-COVERED ITEM OR SERVICE: HCPCS | Performed by: INTERNAL MEDICINE

## 2021-12-15 PROCEDURE — 700102 HCHG RX REV CODE 250 W/ 637 OVERRIDE(OP): Performed by: PSYCHIATRY & NEUROLOGY

## 2021-12-15 PROCEDURE — 700111 HCHG RX REV CODE 636 W/ 250 OVERRIDE (IP): Performed by: NURSE PRACTITIONER

## 2021-12-15 PROCEDURE — 700102 HCHG RX REV CODE 250 W/ 637 OVERRIDE(OP): Performed by: INTERNAL MEDICINE

## 2021-12-15 PROCEDURE — 36556 INSERT NON-TUNNEL CV CATH: CPT | Mod: RT | Performed by: NURSE PRACTITIONER

## 2021-12-15 PROCEDURE — 97162 PT EVAL MOD COMPLEX 30 MIN: CPT

## 2021-12-15 PROCEDURE — 85025 COMPLETE CBC W/AUTO DIFF WBC: CPT

## 2021-12-15 PROCEDURE — 700105 HCHG RX REV CODE 258: Performed by: STUDENT IN AN ORGANIZED HEALTH CARE EDUCATION/TRAINING PROGRAM

## 2021-12-15 PROCEDURE — A9270 NON-COVERED ITEM OR SERVICE: HCPCS | Performed by: PSYCHIATRY & NEUROLOGY

## 2021-12-15 PROCEDURE — C1751 CATH, INF, PER/CENT/MIDLINE: HCPCS

## 2021-12-15 PROCEDURE — 36556 INSERT NON-TUNNEL CV CATH: CPT

## 2021-12-15 PROCEDURE — 700105 HCHG RX REV CODE 258: Performed by: PSYCHIATRY & NEUROLOGY

## 2021-12-15 RX ORDER — AMLODIPINE BESYLATE 5 MG/1
10 TABLET ORAL
Status: DISCONTINUED | OUTPATIENT
Start: 2021-12-15 | End: 2021-12-17 | Stop reason: HOSPADM

## 2021-12-15 RX ORDER — HYDRALAZINE HYDROCHLORIDE 20 MG/ML
10-20 INJECTION INTRAMUSCULAR; INTRAVENOUS EVERY 4 HOURS PRN
Status: DISCONTINUED | OUTPATIENT
Start: 2021-12-15 | End: 2021-12-17 | Stop reason: HOSPADM

## 2021-12-15 RX ORDER — LABETALOL HYDROCHLORIDE 5 MG/ML
10-20 INJECTION, SOLUTION INTRAVENOUS EVERY 4 HOURS
Status: DISCONTINUED | OUTPATIENT
Start: 2021-12-15 | End: 2021-12-15

## 2021-12-15 RX ORDER — LABETALOL HYDROCHLORIDE 5 MG/ML
10-20 INJECTION, SOLUTION INTRAVENOUS EVERY 4 HOURS PRN
Status: DISCONTINUED | OUTPATIENT
Start: 2021-12-15 | End: 2021-12-17 | Stop reason: HOSPADM

## 2021-12-15 RX ADMIN — DOCUSATE SODIUM 50 MG AND SENNOSIDES 8.6 MG 2 TABLET: 8.6; 5 TABLET, FILM COATED ORAL at 06:09

## 2021-12-15 RX ADMIN — POTASSIUM PHOSPHATE, MONOBASIC AND POTASSIUM PHOSPHATE, DIBASIC 15 MMOL: 224; 236 INJECTION, SOLUTION, CONCENTRATE INTRAVENOUS at 09:34

## 2021-12-15 RX ADMIN — HYDRALAZINE HYDROCHLORIDE 10 MG: 20 INJECTION INTRAMUSCULAR; INTRAVENOUS at 22:37

## 2021-12-15 RX ADMIN — SODIUM CHLORIDE 500 ML: 3 INJECTION, SOLUTION INTRAVENOUS at 21:44

## 2021-12-15 RX ADMIN — SODIUM CHLORIDE 500 ML: 3 INJECTION, SOLUTION INTRAVENOUS at 10:37

## 2021-12-15 RX ADMIN — HYDRALAZINE HYDROCHLORIDE 10 MG: 20 INJECTION INTRAMUSCULAR; INTRAVENOUS at 18:06

## 2021-12-15 RX ADMIN — SODIUM CHLORIDE 500 ML: 3 INJECTION, SOLUTION INTRAVENOUS at 09:34

## 2021-12-15 RX ADMIN — NICARDIPINE HYDROCHLORIDE 5 MG/HR: 25 INJECTION, SOLUTION INTRAVENOUS at 04:56

## 2021-12-15 RX ADMIN — AMLODIPINE BESYLATE 10 MG: 10 TABLET ORAL at 07:36

## 2021-12-15 ASSESSMENT — PAIN DESCRIPTION - PAIN TYPE
TYPE: ACUTE PAIN

## 2021-12-15 ASSESSMENT — COGNITIVE AND FUNCTIONAL STATUS - GENERAL
DRESSING REGULAR LOWER BODY CLOTHING: A LOT
MOVING FROM LYING ON BACK TO SITTING ON SIDE OF FLAT BED: A LOT
EATING MEALS: A LITTLE
SUGGESTED CMS G CODE MODIFIER MOBILITY: CL
WALKING IN HOSPITAL ROOM: A LITTLE
TURNING FROM BACK TO SIDE WHILE IN FLAT BAD: A LITTLE
MOBILITY SCORE: 14
SUGGESTED CMS G CODE MODIFIER DAILY ACTIVITY: CK
PERSONAL GROOMING: A LITTLE
DAILY ACTIVITIY SCORE: 15
CLIMB 3 TO 5 STEPS WITH RAILING: A LOT
DRESSING REGULAR UPPER BODY CLOTHING: A LITTLE
HELP NEEDED FOR BATHING: A LOT
STANDING UP FROM CHAIR USING ARMS: A LITTLE
TOILETING: A LOT
MOVING TO AND FROM BED TO CHAIR: UNABLE

## 2021-12-15 ASSESSMENT — ACTIVITIES OF DAILY LIVING (ADL): TOILETING: INDEPENDENT

## 2021-12-15 ASSESSMENT — GAIT ASSESSMENTS
DISTANCE (FEET): 50
GAIT LEVEL OF ASSIST: MINIMAL ASSIST
DEVIATION: DECREASED BASE OF SUPPORT;DECREASED HEEL STRIKE;DECREASED TOE OFF;BRADYKINETIC

## 2021-12-15 NOTE — DISCHARGE PLANNING
Anticipated Discharge Disposition: TBD, likely IRF    Action: RN RAMEZ attended IDT rounds and reviewed patient chart.  Patient being followed by IRF for potential admission.  PT/OT evals, pending review from rehab      Barriers to Discharge: medical clearance; ICU level of care, facility acceptance, insurance auth, bed availability     Plan: HCM to continue to follow and assist with discharge planning needs and barriers

## 2021-12-15 NOTE — CARE PLAN
The patient is Stable - Low risk of patient condition declining or worsening         Progress made toward(s) clinical / shift goals:    Problem: Knowledge Deficit - Standard  Goal: Patient and family/care givers will demonstrate understanding of plan of care, disease process/condition, diagnostic tests and medications  Outcome: Progressing     Problem: Skin Integrity  Goal: Skin integrity is maintained or improved  Outcome: Progressing     Problem: Fall Risk  Goal: Patient will remain free from falls  Outcome: Progressing       Patient is not progressing towards the following goals:  Patient still has decreased movement on the RUE.  However, per medical student it is improved some since yesterday

## 2021-12-15 NOTE — PROGRESS NOTES
Daily Progress Note:     Date of Service: 12/15/2021  Primary Team: [facility] Gold Team   Attending: Andriy Collazo M.D.   Senior Resident: Dr. Zapata    Contact:  [phone]    Chief Complaint:   Intracranial Hemorrhage for left basal ganglia secondary to uncontrolled hypertension  Acute onset right sided weakness    Interval History   VS:110/58, averages at 120s systolics, highest was 167/81 on nicardipine gtt. Was given amlodipine 10 mg qdaily. HR 76   Tmax 36.5  Non vented  Speech noted patient ot have oropharnygeal dysphagia hence on cotrak feed. Patient ambulated today.       Consultants/Specialty:  Neurology  Speech therapy     Review of Systems:      ROS    Objective Data:   Physical Exam:   Vitals:   Temp:  [35.9 °C (96.7 °F)-36.5 °C (97.7 °F)] 36.4 °C (97.5 °F)  Pulse:  [68-86] 76  Resp:  [10-66] 60  BP: (110-161)/(55-84) 128/65  SpO2:  [90 %-99 %] 94 %     Physical Exam  Constitutional:       General: She is not in acute distress.     Appearance: She is not toxic-appearing.   HENT:      Head: Normocephalic.   Eyes:      Extraocular Movements: Extraocular movements intact.      Pupils: Pupils are equal, round, and reactive to light.   Cardiovascular:      Rate and Rhythm: Normal rate.   Pulmonary:      Effort: Pulmonary effort is normal. No respiratory distress.      Breath sounds: Normal breath sounds.   Abdominal:      General: Abdomen is flat.   Skin:     General: Skin is warm.   Neurological:      Mental Status: She is alert.      Comments: Mentation. Alert and oriented 4x  CN : Right sided facial drop, still dysarthric,   Motor: right upper ext--at least 3/5 against gravity. Left upper and lower 5/5  Coordination: finger to nose normal  Reflex: not tested  Sensory: mild right sided sensory deficit  Gait: saw patient ambulate, no gross abnormalities noted   Psychiatric:         Mood and Affect: Mood normal.         Behavior: Behavior normal.         Thought Content: Thought content normal.          Judgment: Judgment normal.           Labs:   Na 143   WBC ct 9.2  Hgb 14.1  Phos 2.2     Imaging:     CT head without on 12/14/2021  IMPRESSION:     Slight interval increase in size of LEFT basal ganglia intraparenchymal hemorrhage with worsening surrounding edema and slight worsening midline shift.      Problem Representation:     Marina is a 55 y/o F who presneted for acute onset r-sided weakness with cortical sign which include aphasia found to have left basal ganglia hemorrhage 2/2 uncontrolled hypertension, currently BP well controlled now with amlodipine added. Patient had been ambulating. Still has dysarthria but speech improved. Stopping 3% saline since swelling/edema of CT head was stable no much change.       Intracranial hemorrhage (HCC)- (present on admission)  Slight Midline shift with surrounding edema --stable  Left basal ganglia hemorrhage --stable     Assessment & Plan    ICH score: 1  Etiology:Likely HTN  Neuro checks and VS per protocol  Conitnue SBP Goal <140mmHg; hydralazine, labetalol and nicardipine PRN. Continue amlodipine   If mentation changes order STAT head CT scan    On seizure, aspiration, and fall precautions   For NPO for now, cortrak placement as needed for enteral access with SLP advancing TF diet.  FEES study in the future if able for assessment oropharyngeal dysphagia trajectory  Continue good oral care    SLP/PT/OT evaluation  DVT PPX: SCDs holding ACC and anti platelets  Glucose control to maintain <180       Appreciate neurology recs.     Essential Hypertension    BP max was 167/81 but averages at 120s systolics,   Screening for 2/2 cause of Essential Hypertension not looking cushingnoid, pulses equal on upper and lower extremities. Crea normal, fasting blood sugar normal.       Plan  -checked TSH  -screen for sleep apnea outpt   --continue on nicardipine gtt w/ prn labetalol and hydralazine  -continue amlodipine 10 mg qdaily    -improve salt intake diet, Do DASH diet at home.  Improve activity level     -Appreciate neurology recs.       Hyperlipidemia  LDL is 179,    Plan  -start on atorvastatin with target of LDL <70 when discharged.     T/L/D  1x PIV  Holding off ASA and anticoagulant, SCDs

## 2021-12-15 NOTE — PROGRESS NOTES
Neurology Progress Note  Neurohospitalist Service, [facility]    Referring Physician: Andriy Collazo M.D.      Interval History: No acute events overnight.  Remains on nicardipine drip.  Blood pressure stable in the 130s.    Review of systems: In addition to what is detailed in the HPI and/or updated in the interval history, all other systems reviewed and are negative.    Past Medical History, Past Surgical History and Social History reviewed and unchanged from prior    Medications:    Current Facility-Administered Medications:   •  amLODIPine (NORVASC) tablet 10 mg, 10 mg, Enteral Tube, Q DAY, Andriy Collazo M.D., 10 mg at 12/15/21 0736  •  potassium phosphate 15 mmol in  mL ivpb, 15 mmol, Intravenous, Once, Andriy Collazo M.D., Last Rate: 62.5 mL/hr at 12/15/21 0934, 15 mmol at 12/15/21 0934  •  hydrALAZINE (APRESOLINE) injection 10-20 mg, 10-20 mg, Intravenous, Q4HRS PRN, Tootie Persaud A.P.R.N.  •  labetalol (NORMODYNE/TRANDATE) injection 10-20 mg, 10-20 mg, Intravenous, Q4HRS, Tootie Persaud A.P.R.N.  •  niCARdipine (CARDENE) 25 mg in  mL Infusion, 0-15 mg/hr, Intravenous, Continuous, Ricky Menendez M.D., Stopped at 12/15/21 1002  •  Respiratory Therapy Consult, , Nebulization, Continuous RT, Tootie Persaud, A.P.R.N.  •  LORazepam (ATIVAN) injection 2 mg, 2 mg, Intravenous, Q5 MIN PRN, VIRGEN Glass.P.R.N.  •  MD Alert...ICU Electrolyte Replacement per Pharmacy, , Other, PHARMACY TO DOSE, VIRGEN Glass.P.R.N.  •  Pharmacy Consult: Enteral tube insertion - review meds/change route/product selection, 1 Each, Other, PHARMACY TO DOSE, Ricky Menendez M.D.  •  senna-docusate (PERICOLACE or SENOKOT S) 8.6-50 MG per tablet 2 Tablet, 2 Tablet, Enteral Tube, BID, 2 Tablet at 12/15/21 0609 **AND** polyethylene glycol/lytes (MIRALAX) PACKET 1 Packet, 1 Packet, Enteral Tube, QDAY PRN **AND** magnesium hydroxide (MILK OF MAGNESIA) suspension 30 mL, 30 mL, Enteral Tube,  "QDAY PRN **AND** bisacodyl (DULCOLAX) suppository 10 mg, 10 mg, Rectal, QDAY PRN, Elvis Damon M.D.  •  acetaminophen (Tylenol) tablet 650 mg, 650 mg, Enteral Tube, Q4HRS PRN **OR** acetaminophen (TYLENOL) suppository 650 mg, 650 mg, Rectal, Q4HRS PRN, Elvis Damon M.D.  •  ondansetron (ZOFRAN ODT) dispertab 4 mg, 4 mg, Enteral Tube, Q4HRS PRN **OR** ondansetron (ZOFRAN) syringe/vial injection 4 mg, 4 mg, Intravenous, Q4HRS PRN, Elvis Damon M.D.  •  3% sodium chloride (HYPERTONIC SALINE) 500mL infusion 500 mL, 500 mL, Intravenous, Continuous, Ricky Menendez M.D., Last Rate: 40 mL/hr at 12/15/21 0934, 500 mL at 12/15/21 0934    Physical Examination:   /64   Pulse 73   Temp 36.2 °C (97.2 °F) (Temporal)   Resp 18   Ht 1.651 m (5' 5\")   Wt 65.8 kg (145 lb 1 oz)   SpO2 97%   BMI 24.14 kg/m²       General: Patient is awake and in no acute distress  Neck: There is normal range of motion  CV: Regular rate   Extremities:  Warm, dry, and intact, without peripheral lower extremity edema    NEUROLOGICAL EXAM:     Mental status: Awake, alert and fully oriented  Speech and language: Speech is dysarthric but fluent. The patient is able to name and repeat, and follow commands  Cranial nerve exam: Pupils are equal, round and reactive to light bilaterally. Visual fields are full. There is no nystagmus. Extraocular muscles are intact.  Right facial droop. Sensation in the face is intact to light touch. Palate elevates symmetrically. Tongue is midline.  Motor exam: Power 3/5 in right upper extremity, 4+/5 in right lower.  Otherwise 5/5 tone is normal. No abnormal movements were seen on exam.  Sensory exam: Reacts to tactile and pinprick in all 4 extremities, no neglect to double stim   Deep tendon reflexes:  2+ throughout. Toes down-going bilaterally.  Coordination: No ataxia on bilateral finger-to-nose testing  Gait: Deferred due to patient preference      NIHSS: National Institutes of Health Stroke " Scale    [0] 1a:Level of Consciousness    0-alert 1-drowsy   2-stupor   3-coma  [0] 1b:LOC Questions                  0-both  1-one      2-neither  [0] 1c:LOC Commands                   0-both  1-one      2-neither  [0] 2: Best Gaze                     0-nl    1-partial  2-forced  [0] 3: Visual Fields                   0-nl    1-partial  2-complete 3-bilat  [1] 4: Facial Paresis                0-nl    1-minor    2-partial  3-full  MOTOR                       0-nl  [2] 5: Right Arm           1-drift  [0] 6: Left Arm             2-some effort vs gravity  [0] 7: Right Leg           3-no effort vs gravity  [0] 8: Left Leg             4-no movement                             x-untestable  [0] 9: Limb Ataxia                    0-abs   1-1_limb   2-2+_limbs       x-untestable  [0] 10:Sensory                        0-nl    1-partial  2-dense  [0] 11:Best Language/Aphasia         0-nl    1-mild/mod 2-severe   3-mute  [1] 12:Dysarthria                     0-nl    1-mild/mod 2-severe       x-untestable  [0] 13:Neglect/Inattention            0-none  1-partial  2-complete  [4] TOTAL      Objective Data:    Labs:  Lab Results   Component Value Date/Time    PROTHROMBTM 11.9 (L) 12/14/2021 05:20 AM    INR 0.95 12/14/2021 05:20 AM      Lab Results   Component Value Date/Time    WBC 9.2 12/15/2021 06:00 AM    RBC 4.59 12/15/2021 06:00 AM    HEMOGLOBIN 14.1 12/15/2021 06:00 AM    HEMATOCRIT 41.7 12/15/2021 06:00 AM    MCV 90.8 12/15/2021 06:00 AM    MCH 30.7 12/15/2021 06:00 AM    MCHC 33.8 12/15/2021 06:00 AM    MPV 10.6 12/15/2021 06:00 AM    NEUTSPOLYS 69.30 12/15/2021 06:00 AM    LYMPHOCYTES 20.90 (L) 12/15/2021 06:00 AM    MONOCYTES 7.10 12/15/2021 06:00 AM    EOSINOPHILS 1.70 12/15/2021 06:00 AM    BASOPHILS 0.70 12/15/2021 06:00 AM      Lab Results   Component Value Date/Time    SODIUM 145 12/15/2021 06:45 AM    POTASSIUM 3.9 12/15/2021 06:45 AM    CHLORIDE 114 (H) 12/15/2021 06:45 AM    CO2 21 12/15/2021 06:45 AM    GLUCOSE  110 (H) 12/15/2021 06:45 AM    BUN 11 12/15/2021 06:45 AM    CREATININE 0.41 (L) 12/15/2021 06:45 AM      Lab Results   Component Value Date/Time    CHOLSTRLTOT 264 (H) 12/14/2021 12:30 PM     (H) 12/14/2021 12:30 PM    HDL 48 12/14/2021 12:30 PM    TRIGLYCERIDE 184 (H) 12/14/2021 12:30 PM       Lab Results   Component Value Date/Time    ALKPHOSPHAT 95 12/15/2021 06:45 AM    ASTSGOT 44 12/15/2021 06:45 AM    ALTSGPT 57 (H) 12/15/2021 06:45 AM    TBILIRUBIN 0.5 12/15/2021 06:45 AM        Imaging/Testing:    I interpreted and/or reviewed the patient's neuroimaging    CT-HEAD W/O   Final Result   Addendum 1 of 1   These findings were electronically conveyed to and received by NAKUL CLEMENTS on 12/14/2021 4:31 PM.      Final      Slight interval increase in size of LEFT basal ganglia intraparenchymal hemorrhage with worsening surrounding edema and slight worsening midline shift.            DX-ABDOMEN FOR TUBE PLACEMENT   Final Result      Feeding tube tip at the gastric fundus.          Assessment and Plan:    Maryjane Gray is a 56 y.o. woman with a history of untreated hypertension who presents with right-sided weakness in the setting of a left subcortical basal ganglia hemorrhage.  Hemorrhages most likely hypertensive in nature.  Patient's neurologic exam is significantly improved today with only some right-sided weakness, dysarthria, and difficulty swallowing.  Core track is still in place.    Problem list:  1.  Left basal ganglia hemorrhage, likely hypertensive    Plan:  -Can relax neurochecks every 2 hours.  Continue every hour vitals while the patient is on nicardipine drip.  -Relaxed blood pressure guidelines to 160/90.  Can initiate additional oral meds to attempt to get the patient off drip and de-escalate from the ICU.  -Stat CT head if any neurologic decline.  -Hold any anticoagulant and antiplatelet medications.  Mechanical DVT prophylaxis only.  -Patient is currently on 3% hypertonic saline.  I  do not feel that this is necessarily warranted at this time given minimal edema and small size of the hemorrhage. CTH repeat was stable.  - PT/OT/SLP. NG tube in place  - Goal of normothermia, euvolemia.     The evaluation of the patient, and recommended management, was discussed with the resident staff. I have performed a physical exam and reviewed and updated ROS and Plan today (12/15/2021).     Richard Simons D.O.  Neurohospitalist, Acute Care Services

## 2021-12-15 NOTE — THERAPY
Occupational Therapy   Initial Evaluation     Patient Name: Maryjane Gray  Age:  56 y.o., Sex:  female  Medical Record #: [id number]  Today's Date: 12/15/2021     Precautions  Precautions: (P) Fall Risk,Swallow Precautions ( See Comments)  Comments: (P) R sided weakness    Assessment    Patient is 56 y.o. female admitted for right sided weakness, pt with left basal ganglia ICH. Pt presenting with RUE weakness/discoordination/sensation deficits. Pt normally independent with all mobility and ADLs at baseline living in a two story home with spouse and working full time. Pt required Lauren for bed mobility, maxA for LB dressing due to RUE deficits, and Lauren for mobility while ambulating due to decreased balance. Pt would benefit from continued skilled therapy while admitted as well as recommend post-acute placement. Pt educated on importance of use of RUE as often as possible while in this recovery phase to improve functional independence for the long term.    Plan    Recommend Occupational Therapy 4 times per week until therapy goals are met for the following treatments:  Adaptive Equipment, Neuro Re-Education / Balance, Self Care/Activities of Daily Living, Sensory Integration Techniques, Therapeutic Activities and Therapeutic Exercises.    DC Equipment Recommendations: (P) Unable to determine at this time  Discharge Recommendations: (P) Recommend post-acute placement for additional occupational therapy services prior to discharge home     Objective       12/15/21 0903   Prior Living Situation   Prior Services Home-Independent   Housing / Facility 2 Story House   Steps Into Home 0   Bathroom Set up Bathtub / Shower Combination   Equipment Owned None   Lives with - Patient's Self Care Capacity Spouse;Adult Children   Comments Lives with dtr and    Prior Level of ADL Function   Self Feeding Independent   Grooming / Hygiene Independent   Bathing Independent   Dressing Independent   Toileting Independent   Prior  Level of IADL Function   Medication Management Independent   Laundry Independent   Kitchen Mobility Independent   Finances Independent   Home Management Independent   Shopping Independent   Prior Level Of Mobility Independent Without Device in Community   Driving / Transportation Driving Independent   Occupation (Pre-Hospital Vocational) Employed Full Time   History of Falls   History of Falls No   Precautions   Precautions Fall Risk;Swallow Precautions ( See Comments)   Comments R sided weakness   Pain 0 - 10 Group   Therapist Pain Assessment Post Activity Pain Same as Prior to Activity;Nurse Notified   Cognition    Cognition / Consciousness X   Speech/ Communication Dysarthric   Orientation Level Not Oriented to Place   Level of Consciousness Alert   Sequencing Impaired   Passive ROM Upper Body   Passive ROM Upper Body WDL   Active ROM Upper Body   Active ROM Upper Body  X   Dominant Hand Right   Comments RUE limited by weakness, LUE WFL   Strength Upper Body   Upper Body Strength  X   Comments RUE weakness    Sensation Upper Body   Upper Extremity Sensation  X   Rt Upper Extremity Light Touch Impaired   Upper Body Muscle Tone   Upper Body Muscle Tone  X   Rt Upper Extremity Muscle Tone Hypotonic   Neurological Concerns   Neurological Concerns Yes   Rt Upper Extremity Gross Motor Control Ataxic   Rt Upper Extremity Functional Use Impaired   Coordination Upper Body   Coordination X   Gross Motor Coordination RUE impaired   Balance Assessment   Sitting Balance (Static) Fair   Sitting Balance (Dynamic) Fair -   Standing Balance (Static) Poor +   Standing Balance (Dynamic) Poor -   Weight Shift Sitting Poor   Weight Shift Standing Poor   Bed Mobility    Supine to Sit Minimal Assist   Scooting Supervised   ADL Assessment   Grooming Supervision;Seated   Upper Body Dressing Minimal Assist   Lower Body Dressing Maximal Assist   Toileting   (NT-refused need)   How much help from another person does the patient currently  need...   Putting on and taking off regular lower body clothing? 2   Bathing (including washing, rinsing, and drying)? 2   Toileting, which includes using a toilet, bedpan, or urinal? 2   Putting on and taking off regular upper body clothing? 3   Taking care of personal grooming such as brushing teeth? 3   Eating meals? 3   6 Clicks Daily Activity Score 15   Modified Merlin (mRS)   Modified Nelson Score 4   Functional Mobility   Sit to Stand Minimal Assist   Bed, Chair, Wheelchair Transfer Minimal Assist   Toilet Transfers Refused   Transfer Method Stand Step   Mobility bed mobility, hallway mobility, up to chair   Comments no AD   ICU Target Mobility Level   ICU Mobility - Targeted Level Level 4   Visual Perception   Visual Perception  WDL   Activity Tolerance   Sitting in Chair left seated in chair   Sitting Edge of Bed 5 min   Standing 10 min   Short Term Goals   Short Term Goal # 1 Pt will complete ADL transfers with supervision   Short Term Goal # 2 Pt will complete LB dressing with supervision   Short Term Goal # 3 Pt will complete toileting with supervision   Education Group   Education Provided Role of Occupational Therapist;Stroke   Role of Occupational Therapist Patient Response Patient;Acceptance;Explanation   Stroke Patient Response Patient;Acceptance;Explanation;Reinforcement Needed   Problem List   Problem List Decreased Active Daily Living Skills;Decreased Homemaking Skills;Decreased Upper Extremity Strength Right;Decreased Upper Extremity AROM Right;Decreased Functional Mobility;Decreased Activity Tolerance;Impaired Coordination Right Upper Extremity;Impaired Sensation Right Upper Extremity;Impaired Upper Extremity Tone Right   Interdisciplinary Plan of Care Collaboration   IDT Collaboration with  Nursing;Physical Therapist   Patient Position at End of Therapy Seated;Chair Alarm On;Phone within Reach;Tray Table within Reach;Call Light within Reach   Collaboration Comments RN updated

## 2021-12-15 NOTE — THERAPY
Physical Therapy   Initial Evaluation     Patient Name: Maryjane Gray  Age:  56 y.o., Sex:  female  Medical Record #: [id number]  Today's Date: 12/15/2021     Precautions: Fall Risk; Swallow Precautions (per SLP)  Comments: R sided deficits, SBP <140    Assessment  Patient is 56 y.o. female presenting acutely with R-sided weakness 2/2 L BG ICH, SBP >180 at presentation. Pt demonstrates RLE weakness 3/5 in comparison to LLE 5/5. Reports diminished light touch sensation throughout RLE. She required min A to negotiate bed mob, transfers, and short distance gait today. Gait impairments include narrow KAYLEIGH and decreased step height bilaterally, no evidence of knee buckling during amb x50ft. Unable to state place however otherwise appears to have good insight of deficits and attention to R side. RUE weakness noted. Anticipate need for post-acute placement prior to DC home as pt is functioning below baseline.     Plan    Recommend Physical Therapy 4 times per week until therapy goals are met for the following treatments:  Bed Mobility, Equipment, Gait Training, Neuro Re-Education / Balance, Therapeutic Activities and Therapeutic Exercises    DC Equipment Recommendations: Unable to determine at this time  Discharge Recommendations: Recommend post-acute placement for additional physical therapy services prior to discharge home     Objective     12/15/21 0859   Vitals   Vitals Comments SBP remained <140   Prior Living Situation   Prior Services Home-Independent   Housing / Facility 2 Story House (able to stay on 1st floor of home)   Steps Into Home 0   Steps In Home (FOS)   Equipment Owned None   Lives with - Patient's Self Care Capacity Spouse;Adult Children   Comments Lives with  and dtr (31 y/o).  works from home. Dtr works during the day.   Prior Level of Functional Mobility   Bed Mobility Independent   Transfer Status Independent   Ambulation Independent   Distance Ambulation (Feet) (Community distances)    Assistive Devices Used None   Stairs Independent   Comments Working as  prior   Cognition    Speech/ Communication (garbled at times)   Orientation Level Not Oriented to Place   Level of Consciousness Alert   Sequencing Impaired   Comments good attention to R side and aware of impairements   Active ROM Lower Body    Active ROM Lower Body  WDL   Strength Lower Body   Comments LLE 5/5; RLE 3/5, no knee buckling throughout amb   Sensation Lower Body   Rt Lower Extremity Light Touch Impaired   Comments LLE WFL   Lower Body Muscle Tone   Lower Body Muscle Tone  WDL   Strength Upper Body   Comments RUE weakness present, R handed at baseline   Balance Assessment   Sitting Balance (Static) Fair   Sitting Balance (Dynamic) Fair -   Standing Balance (Static) Poor +   Standing Balance (Dynamic) Poor -   Comments min A for balance with amb   Gait Analysis   Gait Level Of Assist Minimal Assist   Assistive Device None   Distance (Feet) 50   Deviation Decreased Base Of Support;Decreased Heel Strike;Decreased Toe Off;Bradykinetic   # of Stairs Climbed 0   Weight Bearing Status No restrictions   Bed Mobility    Supine to Sit Minimal Assist   Sit to Supine (Up in chair post assessment)   Functional Mobility   Bed, Chair, Wheelchair Transfer Minimal Assist   Short Term Goals    Short Term Goal # 1 Pt will perform supine<>sit with HOB flat with SPV within 6 visits to improve ind with bed mob.   Short Term Goal # 2 Pt will perform bed<>chair transfers with SPV within 6 visits to improve ind with OOB activity.   Short Term Goal # 3 Pt will amb >150ft with SPV within 6 visits to negotiate household distances at DE.

## 2021-12-15 NOTE — PROGRESS NOTES
Dr. Richard Simons, Neurologist at patient bedside with neurology team.  States that we can loosen her SBP to maintain it less than 160.  In addition, states we can decrease neuro checks to every two hours.  Will update intensivist team.

## 2021-12-16 ENCOUNTER — APPOINTMENT (OUTPATIENT)
Dept: RADIOLOGY | Facility: MEDICAL CENTER | Age: 56
DRG: 064 | End: 2021-12-16
Attending: STUDENT IN AN ORGANIZED HEALTH CARE EDUCATION/TRAINING PROGRAM
Payer: COMMERCIAL

## 2021-12-16 PROBLEM — E83.39 HYPOPHOSPHATEMIA: Status: ACTIVE | Noted: 2021-12-16

## 2021-12-16 PROBLEM — I10 PRIMARY HYPERTENSION: Status: ACTIVE | Noted: 2021-12-16

## 2021-12-16 PROBLEM — R74.8 ELEVATED LIVER ENZYMES: Status: ACTIVE | Noted: 2021-12-16

## 2021-12-16 LAB
ALBUMIN SERPL BCP-MCNC: 4.3 G/DL (ref 3.2–4.9)
ALBUMIN/GLOB SERPL: 1.3 G/DL
ALP SERPL-CCNC: 100 U/L (ref 30–99)
ALT SERPL-CCNC: 62 U/L (ref 2–50)
ANION GAP SERPL CALC-SCNC: 11 MMOL/L (ref 7–16)
AST SERPL-CCNC: 41 U/L (ref 12–45)
BASOPHILS # BLD AUTO: 0.4 % (ref 0–1.8)
BASOPHILS # BLD: 0.05 K/UL (ref 0–0.12)
BILIRUB SERPL-MCNC: 0.5 MG/DL (ref 0.1–1.5)
BUN SERPL-MCNC: 13 MG/DL (ref 8–22)
CALCIUM SERPL-MCNC: 9.3 MG/DL (ref 8.5–10.5)
CHLORIDE SERPL-SCNC: 117 MMOL/L (ref 96–112)
CO2 SERPL-SCNC: 22 MMOL/L (ref 20–33)
CREAT SERPL-MCNC: 0.53 MG/DL (ref 0.5–1.4)
EOSINOPHIL # BLD AUTO: 0.01 K/UL (ref 0–0.51)
EOSINOPHIL NFR BLD: 0.1 % (ref 0–6.9)
ERYTHROCYTE [DISTWIDTH] IN BLOOD BY AUTOMATED COUNT: 48.7 FL (ref 35.9–50)
GLOBULIN SER CALC-MCNC: 3.3 G/DL (ref 1.9–3.5)
GLUCOSE SERPL-MCNC: 168 MG/DL (ref 65–99)
HCT VFR BLD AUTO: 45.4 % (ref 37–47)
HGB BLD-MCNC: 15.1 G/DL (ref 12–16)
IMM GRANULOCYTES # BLD AUTO: 0.19 K/UL (ref 0–0.11)
IMM GRANULOCYTES NFR BLD AUTO: 1.6 % (ref 0–0.9)
LYMPHOCYTES # BLD AUTO: 1.16 K/UL (ref 1–4.8)
LYMPHOCYTES NFR BLD: 9.8 % (ref 22–41)
MAGNESIUM SERPL-MCNC: 2.3 MG/DL (ref 1.5–2.5)
MCH RBC QN AUTO: 30.7 PG (ref 27–33)
MCHC RBC AUTO-ENTMCNC: 33.3 G/DL (ref 33.6–35)
MCV RBC AUTO: 92.3 FL (ref 81.4–97.8)
MONOCYTES # BLD AUTO: 0.47 K/UL (ref 0–0.85)
MONOCYTES NFR BLD AUTO: 4 % (ref 0–13.4)
NEUTROPHILS # BLD AUTO: 9.97 K/UL (ref 2–7.15)
NEUTROPHILS NFR BLD: 84.1 % (ref 44–72)
NRBC # BLD AUTO: 0 K/UL
NRBC BLD-RTO: 0 /100 WBC
PHOSPHATE SERPL-MCNC: 1.9 MG/DL (ref 2.5–4.5)
PLATELET # BLD AUTO: 196 K/UL (ref 164–446)
PMV BLD AUTO: 10.6 FL (ref 9–12.9)
POTASSIUM SERPL-SCNC: 3.6 MMOL/L (ref 3.6–5.5)
PROT SERPL-MCNC: 7.6 G/DL (ref 6–8.2)
RBC # BLD AUTO: 4.92 M/UL (ref 4.2–5.4)
SODIUM SERPL-SCNC: 150 MMOL/L (ref 135–145)
TSH SERPL DL<=0.005 MIU/L-ACNC: 0.41 UIU/ML (ref 0.38–5.33)
WBC # BLD AUTO: 11.9 K/UL (ref 4.8–10.8)

## 2021-12-16 PROCEDURE — 700102 HCHG RX REV CODE 250 W/ 637 OVERRIDE(OP): Performed by: STUDENT IN AN ORGANIZED HEALTH CARE EDUCATION/TRAINING PROGRAM

## 2021-12-16 PROCEDURE — 700111 HCHG RX REV CODE 636 W/ 250 OVERRIDE (IP): Performed by: NURSE PRACTITIONER

## 2021-12-16 PROCEDURE — 770001 HCHG ROOM/CARE - MED/SURG/GYN PRIV*

## 2021-12-16 PROCEDURE — A9270 NON-COVERED ITEM OR SERVICE: HCPCS | Performed by: HOSPITALIST

## 2021-12-16 PROCEDURE — 97530 THERAPEUTIC ACTIVITIES: CPT

## 2021-12-16 PROCEDURE — 99233 SBSQ HOSP IP/OBS HIGH 50: CPT | Mod: GC | Performed by: PSYCHIATRY & NEUROLOGY

## 2021-12-16 PROCEDURE — 700102 HCHG RX REV CODE 250 W/ 637 OVERRIDE(OP): Performed by: INTERNAL MEDICINE

## 2021-12-16 PROCEDURE — 83735 ASSAY OF MAGNESIUM: CPT

## 2021-12-16 PROCEDURE — 700105 HCHG RX REV CODE 258: Performed by: STUDENT IN AN ORGANIZED HEALTH CARE EDUCATION/TRAINING PROGRAM

## 2021-12-16 PROCEDURE — 92526 ORAL FUNCTION THERAPY: CPT

## 2021-12-16 PROCEDURE — 84100 ASSAY OF PHOSPHORUS: CPT

## 2021-12-16 PROCEDURE — A9270 NON-COVERED ITEM OR SERVICE: HCPCS | Performed by: PSYCHIATRY & NEUROLOGY

## 2021-12-16 PROCEDURE — 85025 COMPLETE CBC W/AUTO DIFF WBC: CPT

## 2021-12-16 PROCEDURE — 700102 HCHG RX REV CODE 250 W/ 637 OVERRIDE(OP): Performed by: HOSPITALIST

## 2021-12-16 PROCEDURE — 92612 ENDOSCOPY SWALLOW (FEES) VID: CPT

## 2021-12-16 PROCEDURE — 84443 ASSAY THYROID STIM HORMONE: CPT

## 2021-12-16 PROCEDURE — A9270 NON-COVERED ITEM OR SERVICE: HCPCS | Performed by: STUDENT IN AN ORGANIZED HEALTH CARE EDUCATION/TRAINING PROGRAM

## 2021-12-16 PROCEDURE — 80053 COMPREHEN METABOLIC PANEL: CPT

## 2021-12-16 PROCEDURE — 700102 HCHG RX REV CODE 250 W/ 637 OVERRIDE(OP): Performed by: PSYCHIATRY & NEUROLOGY

## 2021-12-16 PROCEDURE — 70450 CT HEAD/BRAIN W/O DYE: CPT

## 2021-12-16 PROCEDURE — A9270 NON-COVERED ITEM OR SERVICE: HCPCS | Performed by: INTERNAL MEDICINE

## 2021-12-16 PROCEDURE — 99223 1ST HOSP IP/OBS HIGH 75: CPT | Performed by: HOSPITALIST

## 2021-12-16 PROCEDURE — 97116 GAIT TRAINING THERAPY: CPT

## 2021-12-16 PROCEDURE — 700101 HCHG RX REV CODE 250: Performed by: NURSE PRACTITIONER

## 2021-12-16 PROCEDURE — 99232 SBSQ HOSP IP/OBS MODERATE 35: CPT | Performed by: STUDENT IN AN ORGANIZED HEALTH CARE EDUCATION/TRAINING PROGRAM

## 2021-12-16 RX ORDER — LISINOPRIL 10 MG/1
10 TABLET ORAL TWICE DAILY
Status: DISCONTINUED | OUTPATIENT
Start: 2021-12-16 | End: 2021-12-17 | Stop reason: HOSPADM

## 2021-12-16 RX ORDER — LISINOPRIL 10 MG/1
10 TABLET ORAL
Status: DISCONTINUED | OUTPATIENT
Start: 2021-12-16 | End: 2021-12-16

## 2021-12-16 RX ORDER — HYDRALAZINE HYDROCHLORIDE 25 MG/1
25 TABLET, FILM COATED ORAL EVERY 6 HOURS
Status: DISCONTINUED | OUTPATIENT
Start: 2021-12-16 | End: 2021-12-17 | Stop reason: HOSPADM

## 2021-12-16 RX ORDER — CHLORTHALIDONE 25 MG/1
25 TABLET ORAL
Status: DISCONTINUED | OUTPATIENT
Start: 2021-12-16 | End: 2021-12-16

## 2021-12-16 RX ADMIN — DOCUSATE SODIUM 50 MG AND SENNOSIDES 8.6 MG 2 TABLET: 8.6; 5 TABLET, FILM COATED ORAL at 05:52

## 2021-12-16 RX ADMIN — DIBASIC SODIUM PHOSPHATE, MONOBASIC POTASSIUM PHOSPHATE AND MONOBASIC SODIUM PHOSPHATE 250 MG: 852; 155; 130 TABLET ORAL at 11:11

## 2021-12-16 RX ADMIN — HYDRALAZINE HYDROCHLORIDE 25 MG: 25 TABLET, FILM COATED ORAL at 08:29

## 2021-12-16 RX ADMIN — HYDRALAZINE HYDROCHLORIDE 25 MG: 25 TABLET, FILM COATED ORAL at 23:49

## 2021-12-16 RX ADMIN — LISINOPRIL 10 MG: 10 TABLET ORAL at 08:29

## 2021-12-16 RX ADMIN — HYDRALAZINE HYDROCHLORIDE 25 MG: 25 TABLET, FILM COATED ORAL at 11:10

## 2021-12-16 RX ADMIN — LABETALOL HYDROCHLORIDE 10 MG: 5 INJECTION, SOLUTION INTRAVENOUS at 11:10

## 2021-12-16 RX ADMIN — AMLODIPINE BESYLATE 10 MG: 10 TABLET ORAL at 05:53

## 2021-12-16 RX ADMIN — DIBASIC SODIUM PHOSPHATE, MONOBASIC POTASSIUM PHOSPHATE AND MONOBASIC SODIUM PHOSPHATE 250 MG: 852; 155; 130 TABLET ORAL at 06:18

## 2021-12-16 RX ADMIN — HYDRALAZINE HYDROCHLORIDE 25 MG: 25 TABLET, FILM COATED ORAL at 17:46

## 2021-12-16 RX ADMIN — DIBASIC SODIUM PHOSPHATE, MONOBASIC POTASSIUM PHOSPHATE AND MONOBASIC SODIUM PHOSPHATE 250 MG: 852; 155; 130 TABLET ORAL at 17:46

## 2021-12-16 RX ADMIN — DIBASIC SODIUM PHOSPHATE, MONOBASIC POTASSIUM PHOSPHATE AND MONOBASIC SODIUM PHOSPHATE 250 MG: 852; 155; 130 TABLET ORAL at 23:49

## 2021-12-16 RX ADMIN — LABETALOL HYDROCHLORIDE 10 MG: 5 INJECTION, SOLUTION INTRAVENOUS at 04:14

## 2021-12-16 RX ADMIN — LISINOPRIL 10 MG: 10 TABLET ORAL at 17:46

## 2021-12-16 RX ADMIN — SODIUM CHLORIDE 500 ML: 3 INJECTION, SOLUTION INTRAVENOUS at 06:24

## 2021-12-16 RX ADMIN — HYDRALAZINE HYDROCHLORIDE 20 MG: 20 INJECTION INTRAMUSCULAR; INTRAVENOUS at 06:18

## 2021-12-16 RX ADMIN — LABETALOL HYDROCHLORIDE 10 MG: 5 INJECTION, SOLUTION INTRAVENOUS at 01:03

## 2021-12-16 ASSESSMENT — ENCOUNTER SYMPTOMS
DOUBLE VISION: 0
DIZZINESS: 0
DIARRHEA: 0
BACK PAIN: 0
NERVOUS/ANXIOUS: 0
STRIDOR: 0
PALPITATIONS: 0
CHILLS: 0
WHEEZING: 0
BLURRED VISION: 0
SPEECH CHANGE: 1
NAUSEA: 0
HEADACHES: 1
ABDOMINAL PAIN: 0
SENSORY CHANGE: 0
SHORTNESS OF BREATH: 0
NECK PAIN: 0
FEVER: 0
COUGH: 0
FOCAL WEAKNESS: 1

## 2021-12-16 ASSESSMENT — COGNITIVE AND FUNCTIONAL STATUS - GENERAL
MOBILITY SCORE: 18
SUGGESTED CMS G CODE MODIFIER MOBILITY: CK
WALKING IN HOSPITAL ROOM: A LITTLE
TURNING FROM BACK TO SIDE WHILE IN FLAT BAD: UNABLE
CLIMB 3 TO 5 STEPS WITH RAILING: A LITTLE
STANDING UP FROM CHAIR USING ARMS: A LITTLE

## 2021-12-16 ASSESSMENT — PAIN DESCRIPTION - PAIN TYPE
TYPE: ACUTE PAIN

## 2021-12-16 ASSESSMENT — PATIENT HEALTH QUESTIONNAIRE - PHQ9
1. LITTLE INTEREST OR PLEASURE IN DOING THINGS: NOT AT ALL
SUM OF ALL RESPONSES TO PHQ9 QUESTIONS 1 AND 2: 0
2. FEELING DOWN, DEPRESSED, IRRITABLE, OR HOPELESS: NOT AT ALL

## 2021-12-16 ASSESSMENT — LIFESTYLE VARIABLES: SUBSTANCE_ABUSE: 0

## 2021-12-16 ASSESSMENT — GAIT ASSESSMENTS
DISTANCE (FEET): 25
GAIT LEVEL OF ASSIST: MINIMAL ASSIST

## 2021-12-16 NOTE — THERAPY
Physical Therapy   Daily Treatment     Patient Name: Maryjane Gray  Age:  56 y.o., Sex:  female  Medical Record #: [id number]  Today's Date: 12/16/2021     Precautions  Precautions: Fall Risk;Swallow Precautions ( See Comments)  Comments: R sided weakness    Assessment    Rec'd pt alert, in bed, family at bedside.  She is able to sit eob w/o assist and w/o bed features.  Able to maintain sitting balance w/o assist.  She is able to stand w/o assist.  Min assist to ambulate w/ unsteady gait and decreased step length RLE.  Overall improvement compared to intial treatment.      Plan    Continue current treatment plan.    NH Equipment Recommendations: Unable to determine at this time  Discharge Recommendations: Recommend post-acute placement for additional physical therapy services prior to discharge home         Objective       12/16/21 1026   Balance   Sitting Balance (Static) Fair   Sitting Balance (Dynamic) Fair   Standing Balance (Static) Fair   Standing Balance (Dynamic) Fair -   Weight Shift Sitting Fair   Weight Shift Standing Fair   Skilled Intervention Verbal Cuing;Tactile Cuing   Gait Analysis   Gait Level Of Assist Minimal Assist   Assistive Device None   Distance (Feet) 25   Deviation   (decreased step length RLE, unsteady )   Skilled Intervention Verbal Cuing;Tactile Cuing;Facilitation   Bed Mobility    Supine to Sit Supervised   Skilled Intervention Verbal Cuing   Functional Mobility   Sit to Stand Supervised   Bed, Chair, Wheelchair Transfer Minimal Assist   Short Term Goals    Short Term Goal # 1 Pt will perform supine<>sit with HOB flat with SPV within 6 visits to improve ind with bed mob.   Goal Outcome # 1 Goal met   Short Term Goal # 2 Pt will perform bed<>chair transfers with SPV within 6 visits to improve ind with OOB activity.   Goal Outcome # 2 Goal met   Short Term Goal # 3 Pt will amb >150ft with SPV within 6 visits to negotiate household distances at NH.   Goal Outcome # 3 Goal not met    Anticipated Discharge Equipment and Recommendations   DC Equipment Recommendations Unable to determine at this time   Discharge Recommendations Recommend post-acute placement for additional physical therapy services prior to discharge home

## 2021-12-16 NOTE — PREADMISSION SCREENING NOTE
Pre-Admission Screening Form    Patient Information:   Name: Maryjane Gray     MRN: [id number]       : 1965      Age: 56 y.o.   Gender: female      Race: White [7]       Marital Status:  [2]  Family Contact: Art Yi        Relationship: Spouse [17]  Home Phone:            Cell Phone: [phone]  Advanced Directives: None  Code Status:  FULL  Current Attending Provider: Andriy Collazo M.D.  Referring Physician: RAFAELA Persaud  Physiatrist Consult: Dr. Schmidt   Referral Date: 21  Primary Payor Source:  [facility]  Secondary Payor Source:      Medical Information:   Date of Admission to Acute Care [place]2021  Room Number: R100/00  Rehabilitation Diagnosis: 0001.2 - Stroke: Right Body Involvement (Left Brain)  Immunization History   Administered Date(s) Administered   • Pfizer SARS-CoV-2 Vaccine 122021, 2021, 10/01/2021   • Tuberculin Skin Test 2014, 2016, 2018, 2020     No Known Allergies  No past medical history on file.  Past Surgical History:   Procedure Laterality Date   • ABDOMINAL HYSTERECTOMY TOTAL         History Leading to Admission, Conditions that Caused the Need for Rehab (CMS):     Dr. Collazo H&P:  Reason for Consultation  ICH     History of Presenting Illness  56 y.o. female who presented 2021 with acute onset r-sided weakness and found to have a left BG ICH. She was tx to [facility] from  for further care in the ICU. Upon presentation her SBP was noted to be > 180.  Assessment/Plan  Intracranial hemorrhage (HCC)- (present on admission)  Assessment & Plan  ICH score: 1  Etiology:Likely HTN  Neuro checks and VS per protocol  SBP Goal <140mmHg; hydralazine, labetalol and nicardipine PRN  Repeat CT this afternoon  seizure, aspiration, and fall precautions   NPO, cortrak placement as needed for enteral access  SLP/PT/OT evaluation  DVT PPX: SCDs  Glucose control to maintain <180     Dr. Simons (Neurology)  recommendations:  Assessment and Plan:     Maryjane Gray is a 56 y.o. woman with unknown past medical history who presents with right-sided weakness and aphasia in the setting of a 3 to 5 cc left subcortical hemorrhage.  The hemorrhage appears to be hypertensive in nature.  I am unclear why she has cortical signs to include aphasia, but there may be some cortical dysfunction due to the bleeds proximity to the cortex.  I reviewed CT head.  There does not appear to be significant mass-effect or edema.     Problem list:  1.  Left basal ganglia hemorrhage, likely hypertensive     PLAN:  - Admit to ICU for every hour neurochecks and vitals.  -Strict BP control less than 160/90. Nicardipine drip.  -Stat CT head if any neurologic decline.  -Hold any anticoagulant and antiplatelet medications.  Mechanical DVT prophylaxis only.  -Patient is currently on 3% hypertonic saline.  I do not feel that this is necessarily warranted at this time given minimal edema and small size of the hemorrhage.  - PT/OT/SLP. NG tube if does not pass bedside swallow.  - Goal of normothermia, euvolemia.      INTRACEREBRAL HEMORRHAGE SCORE:     GCS SCORE:      Eyes - 4 - Open Spontaneously  Motor - 5 - Localizes to pain  Verbal - 2 - Incomprehensible speech     GCS Total - 11     ICH SCORE:  GCS 5-12 [1]  Age < 80 - [0]  ICH Volume <30cc [0]  Intraventricular? No [0]  Infratentorial? No [0]     ICH TOTAL SCORE: 1    Dr. Schmidt (Physiatry) recommendations:  ASSESSMENT:  Patient is a 56 y.o. female admitted with aphasia and right-sided weakness found to have a left basal ganglia hemorrhage.     [place] Code / Diagnosis to Support: 0001.2 - Stroke: Right Body Involvement (Left Brain)     Rehabilitation: Impaired ADLs and mobility  Therapy evals pending, anticipate patient will have adequate tolerance for PT/OT and be appropriate for IRF level therapy, see dispo details below.      Barriers to transfer include: Insurance authorization, TCCs to verify  disposition, medical clearance and bed availability      Additional Recommendations:  Left basal ganglia hemorrhage  -Greatest impairments at this time are dysphagia and right-sided weakness with impaired mobility and ADLs,   -CT head with evidence of left basal ganglia hemorrhage suspected to be secondary to hypertension, goal SBP<140, suspected cortex involvement in hemorrhage causing expression and word finding impairment   -Currently on nicardipine drip for blood pressure control  -Repeat CT head pending  -PT/OT evaluations are pending     Dysphagia  - currently NPO, with Cortrak  - will need long term nutrition plan confirmation prior to acceptance to IRF      Disposition  - Patient is currently functioning below her level of baseline, will need post acute rehab   - PT/OT evals pending, but anticipate adequate tolerance for IRF level therapy with 3hrs of therapy 5 days per week  - prior to acceptance to IRF level therapy will need insurance auth, PT/OT evals, and confirmation of family's ability to provide care.   - PM&R to continue to follow closely      Medical Complexity:  Left basal ganglia hemorrhage  Dysphagia  Right-sided weakness  Hypertension  Hyperlipidemia  Impaired mobility and ADLs              DVT PPX: SCDs    Head CT 12-14-21:  1.  Left basal ganglia hemorrhage with surrounding mild vasogenic edema  2.  Mass effect with partial effacement of the left ankle    Co-morbidities: See PMH  Potential Risk - Complications: Aphasia, Cognitive Impairment, Contractures, Deep Vein Thrombosis, Dysphagia, Incontinence, Malnutrition, Pain, Perceptual Impairment, Pneumonia, Pressure Ulcer, Seizures and Urinary Tract Infection  Level of Risk: High    Ongoing Medical Management Needed (Medical/Nursing Needs):   Patient Active Problem List    Diagnosis Date Noted   • Intracranial hemorrhage (HCC) 12/14/2021     Alert with confusion/forgetfulness.    Current Vital Signs:   Temperature: 36.2 °C (97.2 °F) Pulse: (!)  "106 Respiration: (!) 80 Blood Pressure: 154/68  Weight: 65.8 kg (145 lb 1 oz) Height: 165.1 cm (5' 5\")  Pulse Oximetry: 95 %        Completed Laboratory Reports:  Recent Labs     12/14/21  0518 12/14/21  0520 12/14/21  1230 12/14/21  1735 12/15/21  0000 12/15/21  0600 12/15/21  0645 12/15/21  1201 12/15/21  1815 12/16/21  0400   WBC  --  8.6  --   --   --  9.2  --   --   --  11.9*   HEMOGLOBIN  --  15.3  --   --   --  14.1  --   --   --  15.1   HEMATOCRIT  --  46.0  --   --   --  41.7  --   --   --  45.4   PLATELETCT  --  190  --   --   --  143*  --   --   --  196   SODIUM  --  142 144 143 143  --  145 147* 149* 150*   POTASSIUM  --  3.6  --   --   --   --  3.9  --   --  3.6   BUN  --  14  --   --   --   --  11  --   --  13   CREATININE  --  0.65  --   --   --   --  0.41*  --   --  0.53   ALBUMIN  --  4.2  --   --   --   --  3.8  --   --  4.3   GLUCOSE  --  98  --   --   --   --  110*  --   --  168*   POCGLUCOSE 101*  --   --   --   --   --   --   --   --   --    INR  --  0.95  --   --   --   --   --   --   --   --      Additional Labs: Not Applicable    Prior Living Situation:   Housing / Facility: 2 Story House  Steps Into Home: 0  Steps In Home:  (FOS)  Lives with - Patient's Self Care Capacity: Spouse,Adult Children  Equipment Owned: None    Prior Level of Function / Living Situation:   Physical Therapy: Prior Services: Home-Independent  Housing / Facility: 2 Story House  Steps Into Home: 0  Steps In Home:  (FOS)  Bathroom Set up: Bathtub / Shower Combination  Equipment Owned: None  Lives with - Patient's Self Care Capacity: Spouse,Adult Children  Bed Mobility: Independent  Transfer Status: Independent  Ambulation: Independent  Distance Ambulation (Feet):  (Community distances)  Assistive Devices Used: None  Stairs: Independent  Current Level of Function:   Gait Level Of Assist: Minimal Assist  Assistive Device: None  Distance (Feet): 50  Deviation: Decreased Base Of Support,Decreased Heel Strike,Decreased Toe " Off,Bradykinetic  # of Stairs Climbed: 0  Weight Bearing Status: No restrictions  Supine to Sit: Minimal Assist  Sit to Supine:  (Up in chair post assessment)  Scooting: Supervised  Sit to Stand: Minimal Assist  Bed, Chair, Wheelchair Transfer: Minimal Assist  Toilet Transfers: Refused  Transfer Method: Stand Step  Sitting in Chair: left seated in chair  Sitting Edge of Bed: 5 min  Standing: 10 min  Occupational Therapy:   Self Feeding: Independent  Grooming / Hygiene: Independent  Bathing: Independent  Dressing: Independent  Toileting: Independent  Medication Management: Independent  Laundry: Independent  Kitchen Mobility: Independent  Finances: Independent  Home Management: Independent  Shopping: Independent  Prior Level Of Mobility: Independent Without Device in Community  Driving / Transportation: Driving Independent  Prior Services: Home-Independent  Housing / Facility: [address]  Occupation (Pre-Hospital Vocational): Employed Full Time  Current Level of Function:   Upper Body Dressing: Minimal Assist  Lower Body Dressing: Maximal Assist  Toileting:  (NT-refused need)  Speech Language Pathology:   Problem List: Dysphagia  Diet / Liquid Recommendation: NPO,Pre-Feeding Trials with SLP Only  Rehabilitation Prognosis/Potential: Good  Estimated Length of Stay: 14-21 days    Nursing:      Continent    Scope/Intensity of Services Recommended:  Physical Therapy: 1 hr / day  5 days / week. Therapeutic Interventions Required: Maximize Endurance, Mobility, Strength and Safety  Occupational Therapy: 1 hr / day 5 days / week. Therapeutic Interventions Required: Maximize Self Care, ADLs, IADLs and Energy Conservation  Speech & Language Pathology: 1 hr / day 5 days / week. Therapeutic Interventions Required: Maximize Cognition, Swallowing and Safety  Rehabilitation Nursin/7. Therapeutic Interventions Required: Monitor Pain, Skin, Wound(s), Intake and Output, Labs, Safety, Aspiration Risk and Family  Training  Rehabilitation Physician: 3 - 5 days / week. Therapeutic Interventions Required: Medical Management  Dietician: Tube Feedings. Therapeutic Interventions Required: .    She requires 24-hour rehabilitation nursing to manage skin care, nutrition and fluid intake, pain control, safety, medication management and patient/family goals. In addition, rehabilitation nursing will reiterate and reinforce therapy skills and equipment use, including ADLs, as well as provide education to the patient and family. Maryjane Gray is willing to participate in and is able to tolerate the proposed plan of care.    Rehabilitation Goals and Plan (Expected frequency & duration of treatment in the IRF):   Return to the Community, Modified Independent Level of Care and Family Able to Provide 24/7 Assistance  Anticipated Date of Rehabilitation Admission: 12-17-21  Patient/Family oriented IRF level of care/facility/plan: Yes  Patient/Family willing to participate in IRF care/facility/plan: Yes  Patient able to tolerate IRF level of care proposed: Yes  Patient has potential to benefit IRF level of care proposed: Yes  Comments: Not Applicable    Special Needs or Precautions - Medical Necessity:  Tube Feedings   Safety Concerns/Precautions:  Fall Risk / High Risk for Falls, Balance, Cognition and Bed / Chair Alarm  Pain Management  Special Equipment: Left Cortrak  IV Site: Peripheral  Current Medications:    Current Facility-Administered Medications Ordered in Epic   Medication Dose Route Frequency Provider Last Rate Last Admin   • phosphorus (K-Phos-Neutral) per tablet 250 mg  250 mg Enteral Tube Q6HRS Ricky Menendez M.D.   250 mg at 12/16/21 0618   • lisinopril (PRINIVIL) tablet 10 mg  10 mg Enteral Tube Q DAY Andriy Collazo M.D.   10 mg at 12/16/21 0829   • hydrALAZINE (APRESOLINE) tablet 25 mg  25 mg Enteral Tube Q6HRS Ricky Menendez M.D.   25 mg at 12/16/21 0829   • amLODIPine (NORVASC) tablet 10 mg  10 mg Enteral Tube Q  DAY Andriy Collazo M.D.   10 mg at 12/16/21 0553   • hydrALAZINE (APRESOLINE) injection 10-20 mg  10-20 mg Intravenous Q4HRS PRN Tootie Brocker, A.P.R.N.   20 mg at 12/16/21 0618   • labetalol (NORMODYNE/TRANDATE) injection 10-20 mg  10-20 mg Intravenous Q4HRS PRN Tootie Persaud, A.P.R.N.   10 mg at 12/16/21 0414   • niCARdipine (CARDENE) 25 mg in  mL Infusion  0-15 mg/hr Intravenous Continuous Tootie Brocker, A.P.R.N.   Stopped at 12/15/21 1002   • Respiratory Therapy Consult   Nebulization Continuous RT Tootie Brocker, A.P.R.N.       • LORazepam (ATIVAN) injection 2 mg  2 mg Intravenous Q5 MIN PRN Tootie Persaud, A.P.R.N.       • MD Alert...ICU Electrolyte Replacement per Pharmacy   Other PHARMACY TO DOSE Tootie Persaud, A.P.R.N.       • Pharmacy Consult: Enteral tube insertion - review meds/change route/product selection  1 Each Other PHARMACY TO DOSE Ricky Menendez M.D.       • senna-docusate (PERICOLACE or SENOKOT S) 8.6-50 MG per tablet 2 Tablet  2 Tablet Enteral Tube BID Elvis Damon M.D.   2 Tablet at 12/16/21 0552    And   • polyethylene glycol/lytes (MIRALAX) PACKET 1 Packet  1 Packet Enteral Tube QDAY PRN Elvis aDmon M.D.        And   • magnesium hydroxide (MILK OF MAGNESIA) suspension 30 mL  30 mL Enteral Tube QDAY PRN Elvis Damon M.D.        And   • bisacodyl (DULCOLAX) suppository 10 mg  10 mg Rectal QDAY PRN Elvis Damon M.D.       • acetaminophen (Tylenol) tablet 650 mg  650 mg Enteral Tube Q4HRS PRN Elvis Damon M.D.        Or   • acetaminophen (TYLENOL) suppository 650 mg  650 mg Rectal Q4HRS PRN Elvis Damon M.D.       • ondansetron (ZOFRAN ODT) dispertab 4 mg  4 mg Enteral Tube Q4HRS PRN Elvis Damon M.D.        Or   • ondansetron (ZOFRAN) syringe/vial injection 4 mg  4 mg Intravenous Q4HRS PRN Elvis Damon M.D.         No current [facility]-ordered outpatient medications on file.     Diet:   DIET ORDERS (From admission to next 24h)     Start      Ordered    12/14/21 1226  Diet: Diet Tube Feed; Formula: Fibersource HN; Goal Rate (mL/Hour): 55; Duration: 24 HR  ALL MEALS        Comments: Start at 25 ml/hour and advance per protocol to goal rate.   Question Answer Comment   Diet Diet Tube Feed    Formula: Fibersource HN    Goal Rate (mL/Hour) 55    Route Cortrak    Duration 24 HR        12/14/21 1226    12/14/21 0907  Diet NPO Restrict to: Strict  ALL MEALS        Question:  Diet NPO Restrict to:  Answer:  Strict    12/14/21 0908                Anticipated Discharge Destination / Patient/Family Goal:  Destination: Home with Assistance Support System: Spouse and Family   Anticipated home health services: OT, PT, SLP, Nursing, Social Work and Aide  Previously used HH service/ provider: Not Applicable  Anticipated DME Needs: [employer]  Outpatient Services: OT, PT and SLP  Alternative resources to address additional identified needs:     Pre-Screen Completed: 12/16/2021 8:25 AM Zeferino Trujillo L.P.N.

## 2021-12-16 NOTE — ASSESSMENT & PLAN NOTE
Goal SBP <160  Amlodipine 10mg daily, hydralazine 25mg QID  Increase lisinopril to 10mg BID  Monitor vitals.  PRN IV bp meds

## 2021-12-16 NOTE — PROGRESS NOTES
Neurology Progress Note  Neurohospitalist Service, [facility]    Referring Physician: Andriy Collazo M.D.      Interval History: No acute events overnight.  Off nicardipine drip.    Review of systems: In addition to what is detailed in the HPI and/or updated in the interval history, all other systems reviewed and are negative.    Past Medical History, Past Surgical History and Social History reviewed and unchanged from prior    Medications:    Current Facility-Administered Medications:   •  phosphorus (K-Phos-Neutral) per tablet 250 mg, 250 mg, Enteral Tube, Q6HRS, Ricky Menendez M.D., 250 mg at 12/16/21 0618  •  lisinopril (PRINIVIL) tablet 10 mg, 10 mg, Enteral Tube, Q DAY, Andriy Collazo M.D., 10 mg at 12/16/21 0829  •  hydrALAZINE (APRESOLINE) tablet 25 mg, 25 mg, Enteral Tube, Q6HRS, Ricky Menendez M.D., 25 mg at 12/16/21 0829  •  amLODIPine (NORVASC) tablet 10 mg, 10 mg, Enteral Tube, Q DAY, Andriy Collazo M.D., 10 mg at 12/16/21 0553  •  hydrALAZINE (APRESOLINE) injection 10-20 mg, 10-20 mg, Intravenous, Q4HRS PRN, Tootie Persaud, A.P.R.N., 20 mg at 12/16/21 0618  •  labetalol (NORMODYNE/TRANDATE) injection 10-20 mg, 10-20 mg, Intravenous, Q4HRS PRN, Tootie Persaud, A.P.R.N., 10 mg at 12/16/21 0414  •  niCARdipine (CARDENE) 25 mg in  mL Infusion, 0-15 mg/hr, Intravenous, Continuous, Tootie Persaud, A.P.R.N., Stopped at 12/15/21 1002  •  Respiratory Therapy Consult, , Nebulization, Continuous RT, Tootie Persaud, A.P.R.N.  •  LORazepam (ATIVAN) injection 2 mg, 2 mg, Intravenous, Q5 MIN PRN, Tootie Persaud, A.P.R.N.  •  MD Alert...ICU Electrolyte Replacement per Pharmacy, , Other, PHARMACY TO DOSE, SARAH Glass  •  Pharmacy Consult: Enteral tube insertion - review meds/change route/product selection, 1 Each, Other, PHARMACY TO DOSE, Rikcy Menendez M.D.  •  senna-docusate (PERICOLACE or SENOKOT S) 8.6-50 MG per tablet 2 Tablet, 2 Tablet, Enteral Tube, BID, 2  "Tablet at 12/16/21 0552 **AND** polyethylene glycol/lytes (MIRALAX) PACKET 1 Packet, 1 Packet, Enteral Tube, QDAY PRN **AND** magnesium hydroxide (MILK OF MAGNESIA) suspension 30 mL, 30 mL, Enteral Tube, QDAY PRN **AND** bisacodyl (DULCOLAX) suppository 10 mg, 10 mg, Rectal, QDAY PRN, Elvis Damon M.D.  •  acetaminophen (Tylenol) tablet 650 mg, 650 mg, Enteral Tube, Q4HRS PRN **OR** acetaminophen (TYLENOL) suppository 650 mg, 650 mg, Rectal, Q4HRS PRN, Elvis Damon M.D.  •  ondansetron (ZOFRAN ODT) dispertab 4 mg, 4 mg, Enteral Tube, Q4HRS PRN **OR** ondansetron (ZOFRAN) syringe/vial injection 4 mg, 4 mg, Intravenous, Q4HRS PRN, Elvis Damon M.D.    Physical Examination:   BP (!) 163/74   Pulse (!) 102   Temp 36.2 °C (97.2 °F) (Temporal)   Resp 17   Ht 1.651 m (5' 5\")   Wt 65.8 kg (145 lb 1 oz)   SpO2 95%   BMI 24.14 kg/m²       General: Patient is awake and in no acute distress  Neck: There is normal range of motion  CV: Regular rate   Extremities:  Warm, dry, and intact, without peripheral lower extremity edema    NEUROLOGICAL EXAM:     Mental status: Awake, alert and fully oriented  Speech and language: Speech is dysarthric but fluent. The patient is able to name and repeat, and follow commands  Cranial nerve exam: Pupils are equal, round and reactive to light bilaterally. Visual fields are full. There is no nystagmus. Extraocular muscles are intact.  Right facial droop. Sensation in the face is intact to light touch. Palate elevates symmetrically. Tongue is midline.  Motor exam: Power 3/5 in right upper extremity, 4+/5 in right lower.  Otherwise 5/5 tone is normal. No abnormal movements were seen on exam.  Sensory exam: Reacts to tactile and pinprick in all 4 extremities, no neglect to double stim   Deep tendon reflexes:  2+ throughout. Toes down-going bilaterally.  Coordination: No ataxia on bilateral finger-to-nose testing  Gait: Deferred due to patient preference      NIHSS: National " Institutes of Health Stroke Scale    [0] 1a:Level of Consciousness    0-alert 1-drowsy   2-stupor   3-coma  [0] 1b:LOC Questions                  0-both  1-one      2-neither  [0] 1c:LOC Commands                   0-both  1-one      2-neither  [0] 2: Best Gaze                     0-nl    1-partial  2-forced  [0] 3: Visual Fields                   0-nl    1-partial  2-complete 3-bilat  [1] 4: Facial Paresis                0-nl    1-minor    2-partial  3-full  MOTOR                       0-nl  [2] 5: Right Arm           1-drift  [0] 6: Left Arm             2-some effort vs gravity  [0] 7: Right Leg           3-no effort vs gravity  [0] 8: Left Leg             4-no movement                             x-untestable  [0] 9: Limb Ataxia                    0-abs   1-1_limb   2-2+_limbs       x-untestable  [0] 10:Sensory                        0-nl    1-partial  2-dense  [0] 11:Best Language/Aphasia         0-nl    1-mild/mod 2-severe   3-mute  [1] 12:Dysarthria                     0-nl    1-mild/mod 2-severe       x-untestable  [0] 13:Neglect/Inattention            0-none  1-partial  2-complete  [4] TOTAL      Objective Data:    Labs:  Lab Results   Component Value Date/Time    PROTHROMBTM 11.9 (L) 12/14/2021 05:20 AM    INR 0.95 12/14/2021 05:20 AM      Lab Results   Component Value Date/Time    WBC 11.9 (H) 12/16/2021 04:00 AM    RBC 4.92 12/16/2021 04:00 AM    HEMOGLOBIN 15.1 12/16/2021 04:00 AM    HEMATOCRIT 45.4 12/16/2021 04:00 AM    MCV 92.3 12/16/2021 04:00 AM    MCH 30.7 12/16/2021 04:00 AM    MCHC 33.3 (L) 12/16/2021 04:00 AM    MPV 10.6 12/16/2021 04:00 AM    NEUTSPOLYS 84.10 (H) 12/16/2021 04:00 AM    LYMPHOCYTES 9.80 (L) 12/16/2021 04:00 AM    MONOCYTES 4.00 12/16/2021 04:00 AM    EOSINOPHILS 0.10 12/16/2021 04:00 AM    BASOPHILS 0.40 12/16/2021 04:00 AM      Lab Results   Component Value Date/Time    SODIUM 150 (H) 12/16/2021 04:00 AM    POTASSIUM 3.6 12/16/2021 04:00 AM    CHLORIDE 117 (H) 12/16/2021 04:00  AM    CO2 22 12/16/2021 04:00 AM    GLUCOSE 168 (H) 12/16/2021 04:00 AM    BUN 13 12/16/2021 04:00 AM    CREATININE 0.53 12/16/2021 04:00 AM      Lab Results   Component Value Date/Time    CHOLSTRLTOT 264 (H) 12/14/2021 12:30 PM     (H) 12/14/2021 12:30 PM    HDL 48 12/14/2021 12:30 PM    TRIGLYCERIDE 184 (H) 12/14/2021 12:30 PM       Lab Results   Component Value Date/Time    ALKPHOSPHAT 100 (H) 12/16/2021 04:00 AM    ASTSGOT 41 12/16/2021 04:00 AM    ALTSGPT 62 (H) 12/16/2021 04:00 AM    TBILIRUBIN 0.5 12/16/2021 04:00 AM        Imaging/Testing:    I interpreted and/or reviewed the patient's neuroimaging    DX-CHEST-LIMITED (1 VIEW)   Final Result         1.  No acute cardiopulmonary disease.      CT-HEAD W/O   Final Result   Addendum 1 of 1   These findings were electronically conveyed to and received by NAKUL CLEMENTS on 12/14/2021 4:31 PM.      Final      Slight interval increase in size of LEFT basal ganglia intraparenchymal hemorrhage with worsening surrounding edema and slight worsening midline shift.            DX-ABDOMEN FOR TUBE PLACEMENT   Final Result      Feeding tube tip at the gastric fundus.          Assessment and Plan:    Maryjane Gray is a 56 y.o. woman with a history of untreated hypertension who presents with right-sided weakness in the setting of a left subcortical basal ganglia hemorrhage.  Hemorrhages most likely hypertensive in nature.  Patient's neurologic exam is significantly improved today with only some right-sided weakness, dysarthria, and difficulty swallowing.  Core track is still in place.    Problem list:  1.  Left basal ganglia hemorrhage, likely hypertensive    Plan:  -Can relax neurochecks to every 4 hours.  Vitals Q2H.  -Relaxed blood pressure guidelines to 160/90.  Can initiate additional oral meds to attempt to keep the patient off drip and de-escalate from the ICU.  -Stat CT head if any neurologic decline.  -Hold any anticoagulant and antiplatelet medications.   Mechanical DVT prophylaxis only.  - PT/OT/SLP. NG tube in place  - Goal of normothermia, euvolemia, normonatremia. 3% discontinued. Can check daily BMP and allow gradual return to normal Na with NG tube flushes and feeds.    Please call neurology if any changes or concerns arise.    The evaluation of the patient, and recommended management, was discussed with the resident staff. I have performed a physical exam and reviewed and updated ROS and Plan today (12/16/2021).     Richard Simons D.O.  Neurohospitalist, Acute Care Services

## 2021-12-16 NOTE — CARE PLAN
Problem: Nutritional:  Goal: Nutrition support tolerated and meeting greater than 85% of estimated needs  Outcome: Met     TF Fibersource HN at goal of 55 ml/hr per flowsheets and discussion at rounds.     RD following.

## 2021-12-16 NOTE — PROGRESS NOTES
Daily Progress Note:     Date of Service: 12/16/2021  Primary Team: [facility] ICU Gold Team   Attending: Andriy Collazo M.D.   Senior Resident: Dr. Zapata    Contact:  [phone]    Chief Complaint:   Intracranial Hemorrhage for left basal ganglia secondary to uncontrolled hypertension  Acute onset right sided weakness    Interval History   Continues to have dysarthria and expressive aphagia. Able to lift right LE off the bed and good strength. Able to partially raise the RUE off the bed.     Hypertensive, added lisinopril to amlodipine and hydralazine PO, will monitor and titrate lisinopril and/ or add other agents if necessary with goal -140.   Speech noted patient ot have oropharnygeal dysphagia hence on cotrak feed.   Will obtain CT head non contrast to assess for changes in the bleed and for edema   Stop hypertonic saline, ok to take out central line   Continue working with therapy (PT/OT/Speech)  Discussed with hospitalist and ok to transfer to med/surg if stable repeat head imaging     Consultants/Specialty:  Neurology  Speech therapy     Review of Systems:      Review of Systems   Constitutional: Negative for chills and fever.   Respiratory: Negative for shortness of breath and wheezing.    Neurological: Positive for headaches (improving ).   Limited do to current medical condition     Objective Data:   Physical Exam:   Vitals:   Temp:  [36.2 °C (97.2 °F)-36.5 °C (97.7 °F)] 36.2 °C (97.2 °F)  Pulse:  [] 106  Resp:  [14-80] 80  BP: (122-177)/(56-90) 154/68  SpO2:  [93 %-97 %] 95 %     Physical Exam  Constitutional:       General: She is not in acute distress.     Appearance: She is normal weight. She is not ill-appearing, toxic-appearing or diaphoretic.   HENT:      Head: Normocephalic and atraumatic.      Nose: Nose normal.      Mouth/Throat:      Mouth: Mucous membranes are dry.   Eyes:      General: No scleral icterus.     Extraocular Movements: Extraocular movements intact.      Pupils: Pupils  are equal, round, and reactive to light.   Cardiovascular:      Rate and Rhythm: Normal rate.      Pulses: Normal pulses.      Heart sounds: Normal heart sounds. No murmur heard.      Pulmonary:      Effort: Pulmonary effort is normal. No respiratory distress.      Breath sounds: Normal breath sounds. No wheezing, rhonchi or rales.   Abdominal:      General: Abdomen is flat. Bowel sounds are normal. There is no distension.      Palpations: Abdomen is soft.      Tenderness: There is no abdominal tenderness.   Musculoskeletal:      Cervical back: Normal range of motion and neck supple.      Right lower leg: No edema.      Left lower leg: No edema.   Skin:     General: Skin is warm and dry.      Findings: No lesion or rash.   Neurological:      Mental Status: She is alert.      Comments: Mentation. Alert and oriented 4x  CN : Right sided facial droop, still dysarthric and expressive aphagia   Motor: right upper ext--at least 3/5 against gravity. Right lower extremity 4/5. Left upper and lower 5/5  Sensory: mild right sided sensory deficit     Psychiatric:         Mood and Affect: Mood normal.         Behavior: Behavior normal.         Thought Content: Thought content normal.         Judgment: Judgment normal.       Imaging:     CT head without on 12/14/2021  IMPRESSION:     Slight interval increase in size of LEFT basal ganglia intraparenchymal hemorrhage with worsening surrounding edema and slight worsening midline shift.      Problem Representation:     Marina is a 55 y/o F who presneted for acute onset r-sided weakness with cortical sign which includes aphasia found to have left basal ganglia hemorrhage 2/2 uncontrolled hypertension, currently off hypertonic saline and nicardipine drip. Titrating PO BP meds. Added lisinopril and PO hydralazine this morning. Repeat head CT pending and transfer orders to the floor placed. Discussed with hospitalist.     Intracranial hemorrhage (HCC)- (present on admission)  Slight  Midline shift with surrounding edema --stable  Left basal ganglia hemorrhage --stable     Assessment & Plan    ICH score: 1  Etiology:Untreated HTN  Neuro checks Q 4 hours and VS per protocol  Conitnue SBP Goal <140mmHg   Continue amlodipine and added lisinopril and PO hydralazine, titrate to goal -140  On seizure, aspiration, and fall precautions   NPO for now, cortrak for enteral access with SLP advancing TF diet.  FEES study in the future if able for assessment oropharyngeal dysphagia trajectory  Continue good oral care    SLP/PT/OT   DVT PPX: SCDs holding ACC and anti platelets  Glucose control to maintain 140-180   Neurology following     Essential Hypertension    Screening for 2/2 cause of Essential Hypertension not looking cushingnoid, pulses equal on upper and lower extremities. Crea normal, fasting blood sugar normal.       Plan  -checked TSH  -screen for sleep apnea outpt   Medications as above   Will need outpatient PCP follow up     Hyperlipidemia  LDL is 179,    Plan  -start on atorvastatin with target of LDL <70 prior to discharge and follow up with PCP    T/L/D  1x PIV  Holding off ASA and anticoagulant, SCDs

## 2021-12-16 NOTE — PROCEDURES
Central Line Insertion    Date/Time: 12/15/2021 8:47 PM  Performed by: SARAH Dang  Authorized by: SARAH Dang     Consent:     Consent obtained:  Verbal and written    Consent given by:  Patient    Risks discussed:  Arterial puncture, bleeding, infection, incorrect placement, nerve damage and pneumothorax    Alternatives discussed:  Delayed treatment  Universal protocol:     Immediately prior to procedure, a time out was called: yes      Patient identity confirmed:  Arm band and verbally with patient  Pre-procedure details:     Hand hygiene: Hand hygiene performed prior to insertion      Sterile barrier technique: All elements of maximal sterile technique followed      Skin preparation:  ChloraPrep  Sedation:     Sedation type:  None  Anesthesia:     Anesthesia method:  Local infiltration    Local anesthetic:  Lidocaine 1% w/o epi  Procedure details:     Location:  R internal jugular    Patient position:  Reverse Trendelenburg    Procedural supplies:  Triple lumen    Catheter size:  7 Fr    Ultrasound guidance: yes      Sterile ultrasound techniques: Sterile gel and sterile probe covers were used      Number of attempts:  1    Successful placement: yes    Post-procedure details:     Post-procedure:  Dressing applied and line sutured    Assessment:  Blood return through all ports, free fluid flow, no pneumothorax on x-ray and placement verified by x-ray    Patient tolerance of procedure:  Tolerated well, no immediate complications  Comments:      Patient on 3% Saline requiring a central venous access for higher titration/dose of infusion.

## 2021-12-16 NOTE — ASSESSMENT & PLAN NOTE
ST/PT/OT  FEEs pending  Left sided intracranial hemorrhage on CT  Avoid ASA, NSAIDS and anticoagulation.  Placement   Rehab consult.  BP control goal SBP <160  3% saline being stopped.  Neurology signing off.

## 2021-12-16 NOTE — CARE PLAN
The patient is Stable - Low risk of patient condition declining or worsening    Shift Goals  Clinical Goals: SBP <160, stable neuro  Patient Goals: BM, rest  Family Goals: Comfort    Progress made toward(s) clinical / shift goals:  Pt ambulated to bedside commode repeatedly throughout the night, reports no headache, numbness, or tingling.       Problem: Knowledge Deficit - Standard  Goal: Patient and family/care givers will demonstrate understanding of plan of care, disease process/condition, diagnostic tests and medications  Outcome: Progressing     Problem: Fall Risk  Goal: Patient will remain free from falls  Outcome: Progressing

## 2021-12-16 NOTE — THERAPY
Speech Language Pathology  Daily Treatment     Patient Name: Maryjane Gray  Age:  56 y.o., Sex:  female  Medical Record #: [id number]  Today's Date: 12/16/2021     Assessment    The patient was seen for dysphagia therapy this date after PT worked with patient. Patient was awake, alert but still exhibiting signs of expressive aphasia. Patient able to express short meaningful phrases during dysphagia therapy. The patient was able to follow oral motor directives but unable to complete oral motor exercises or pharyngeal exercises with accuracy this session. The patient was given PO trials of ice chips, MTL via spoon and cup sip as well as liquidized purees. The patient consumed PO trials with intermittent increased WOB but otherwise no overt s/s of aspiration or difficulty. Given Patient with increased risk for silent aspiration and potential sensory deficits related to basal ganglia stroke. Instrumental swallow study via FEES recommended in the to visualize oropharyngeal swallow function, determine least restrictive diet and appropriate treatment plan.     Plan    Recommendations: 1) Continue NPO/TF pending FEES evaluation. 2) SLP following for aphasia evaluation.     Continue current treatment plan.    Discharge Recommendations: Recommend NEIS follow up for continued progression toward developmental milestones     Objective       12/16/21 1032   Cognitive-Linguistic   Level of Consciousness Alert   Dysphagia    Oral / Pharyngeal / Laryngeal Exercises Pharyngeal Constriction Exercises;Laryngeal Exercises  (Attempted poor accuracy 2/2 ?oral apraxia)   Other Treatments PO trials of ice chips, MTL and purees.    Diet / Liquid Recommendation NPO;Pre-Feeding Trials with SLP Only   Nutritional Liquid Intake Rating Scale Nothing by mouth   Nutritional Food Intake Rating Scale Nothing by mouth   Nursing Communication Swallow Precaution Sign Posted at Head of Bed   Skilled Intervention Compensatory Strategies;Verbal Cueing  "  Recommended Route of Medication Administration   Medication Administration  Via Gastric Tube   Patient / Family Goals   Patient / Family Goal #1 'How long is she going to be here?\" - at bedside    Goal #1 Outcome Progressing as expected   Short Term Goals   Short Term Goal # 1 The pt will consume prefeeding trials with no overt s/sx of aspiration    Goal Outcome # 1 Progressing as expected         "

## 2021-12-16 NOTE — DISCHARGE PLANNING
Spoke with Art, spouse regarding [facility] and D/C resources/support.  He is agreeable with an admission.  Art Wesley and their adult daughter live in a 2LV home with 1ST to enter.  Maryjane will stay on the entry level.  Art works 4 hours a day as a .  Their daughter works F/T days @ a .  We spoke about the likely need for 24/7 supervision/assistance.  This will likely be possible.      0832-Submitted to insurance ([facility]) prior authorization reference number is [id number]    1243-Insurance has authorized. #[id number].  Head CT pending.

## 2021-12-16 NOTE — CONSULTS
Hospital Medicine Consultation    Date of Service  2021    Referring Physician  Rajiv Collazo M.D.    Consulting Physician  Oliver Sierra D.O.    Reason for Consultation  Transfer of care out of ICU for intracranial hemorrhage    History of Presenting Illness  Maryjane Gray is a 56 y.o. female who presented 2021 with acute right sided weakness and found to have a left sided intracranial hemorrhage likely hypertensive in origin.  She was admitted to the ICU for BP control and monitored neurologically.  Neurology consulted. She has ongoing deficit with right sided weakness, fine motor skills and dysarthria.      Review of Systems  Review of Systems   Constitutional: Negative for chills and fever.   Eyes: Negative for blurred vision and double vision.   Respiratory: Negative for cough, shortness of breath and stridor.    Cardiovascular: Negative for chest pain, palpitations and leg swelling.   Gastrointestinal: Negative for abdominal pain, diarrhea and nausea.   Genitourinary: Negative for dysuria.   Musculoskeletal: Negative for back pain and neck pain.   Skin: Negative for rash.   Neurological: Positive for speech change, focal weakness (right side) and headaches. Negative for dizziness and sensory change.   Psychiatric/Behavioral: Negative for substance abuse. The patient is not nervous/anxious.        Past Medical History  None    Surgical History   has a past surgical history that includes abdominal hysterectomy total.    Family History  Mother and brother with history of stroke  Mother   Father alive.    Social History   reports that she has never smoked. She has never used smokeless tobacco. She reports that she does not drink alcohol and does not use drugs.  .  Has two grown children.  Works in a .    Medications  Current Facility-Administered Medications   Medication Dose Route Frequency Provider Last Rate Last Admin   • phosphorus (K-Phos-Neutral) per tablet 250 mg  250  mg Enteral Tube Q6HRS Ricky Menendez M.D.   250 mg at 12/16/21 1111   • lisinopril (PRINIVIL) tablet 10 mg  10 mg Enteral Tube Q DAY Andriy Collazo M.D.   10 mg at 12/16/21 0829   • hydrALAZINE (APRESOLINE) tablet 25 mg  25 mg Enteral Tube Q6HRS Ricky Menendez M.D.   25 mg at 12/16/21 1110   • amLODIPine (NORVASC) tablet 10 mg  10 mg Enteral Tube Q DAY Andriy Collazo M.D.   10 mg at 12/16/21 0553   • hydrALAZINE (APRESOLINE) injection 10-20 mg  10-20 mg Intravenous Q4HRS PRN Tootie Persaud, A.P.R.N.   20 mg at 12/16/21 0618   • labetalol (NORMODYNE/TRANDATE) injection 10-20 mg  10-20 mg Intravenous Q4HRS PRN Tootie Persaud, A.P.R.N.   10 mg at 12/16/21 1110   • Respiratory Therapy Consult   Nebulization Continuous RT Tootie Persaud, A.P.R.N.       • LORazepam (ATIVAN) injection 2 mg  2 mg Intravenous Q5 MIN PRN Tootie Persaud, A.P.R.N.       • Pharmacy Consult: Enteral tube insertion - review meds/change route/product selection  1 Each Other PHARMACY TO DOSE Ricky Menendez M.D.       • senna-docusate (PERICOLACE or SENOKOT S) 8.6-50 MG per tablet 2 Tablet  2 Tablet Enteral Tube BID Elvis Damon M.D.   2 Tablet at 12/16/21 0552    And   • polyethylene glycol/lytes (MIRALAX) PACKET 1 Packet  1 Packet Enteral Tube QDAY PRN Elvis Damon M.D.        And   • magnesium hydroxide (MILK OF MAGNESIA) suspension 30 mL  30 mL Enteral Tube QDAY PRN Elvis Damon M.D.        And   • bisacodyl (DULCOLAX) suppository 10 mg  10 mg Rectal QDAY PRN Elvis Damon M.D.       • acetaminophen (Tylenol) tablet 650 mg  650 mg Enteral Tube Q4HRS PRN Elvis Damon M.D.        Or   • acetaminophen (TYLENOL) suppository 650 mg  650 mg Rectal Q4HRS PRN Elvis Damon M.D.       • ondansetron (ZOFRAN ODT) dispertab 4 mg  4 mg Enteral Tube Q4HRS PRN Elvis Damon M.D.        Or   • ondansetron (ZOFRAN) syringe/vial injection 4 mg  4 mg Intravenous Q4HRS PRN Elvis Damon M.D.           Allergies  No  Known Allergies    Physical Exam  Temp:  [36.2 °C (97.2 °F)-36.5 °C (97.7 °F)] 36.2 °C (97.2 °F)  Pulse:  [] 92  Resp:  [14-80] 32  BP: (134-177)/(56-90) 152/72  SpO2:  [93 %-97 %] 96 %    Physical Exam  Vitals reviewed.   Constitutional:       Appearance: Normal appearance. She is not diaphoretic.   HENT:      Head: Normocephalic and atraumatic.      Nose: Nose normal.      Mouth/Throat:      Mouth: Mucous membranes are moist.      Pharynx: No oropharyngeal exudate.   Eyes:      General: No scleral icterus.        Right eye: No discharge.         Left eye: No discharge.      Extraocular Movements: Extraocular movements intact.      Conjunctiva/sclera: Conjunctivae normal.   Cardiovascular:      Rate and Rhythm: Normal rate and regular rhythm.      Pulses:           Radial pulses are 2+ on the right side and 2+ on the left side.        Dorsalis pedis pulses are 2+ on the right side and 2+ on the left side.      Heart sounds: No murmur heard.      Pulmonary:      Effort: Pulmonary effort is normal. No respiratory distress.      Breath sounds: Normal breath sounds. No wheezing.   Abdominal:      General: Bowel sounds are normal. There is no distension.      Palpations: Abdomen is soft.      Tenderness: There is no abdominal tenderness.   Musculoskeletal:         General: No swelling or tenderness.      Cervical back: Neck supple. No muscular tenderness.      Right lower leg: No edema.      Left lower leg: No edema.   Lymphadenopathy:      Cervical: No cervical adenopathy.   Skin:     Coloration: Skin is not jaundiced or pale.   Neurological:      Mental Status: She is alert and oriented to person, place, and time.      Cranial Nerves: Cranial nerve deficit present.      Sensory: No sensory deficit.      Motor: Weakness present.      Coordination: Coordination abnormal.      Comments: Right sided weakness, right sided facial droop   Psychiatric:         Mood and Affect: Mood normal.         Behavior: Behavior  normal.         Fluids  Date 12/16/21 0700 - 12/17/21 0659   Shift 6011-5525 7284-3748 2296-2442 24 Hour Total   INTAKE   I.V. 150   150   Other 120   120   Enteral 300   300   Shift Total 570   570   OUTPUT   Urine 250   250   Shift Total 250   250   Weight (kg) 65.8 65.8 65.8 65.8       Laboratory  Recent Labs     12/14/21  0520 12/15/21  0600 12/16/21  0400   WBC 8.6 9.2 11.9*   RBC 5.09 4.59 4.92   HEMOGLOBIN 15.3 14.1 15.1   HEMATOCRIT 46.0 41.7 45.4   MCV 90.4 90.8 92.3   MCH 30.1 30.7 30.7   MCHC 33.3* 33.8 33.3*   RDW 44.2 47.1 48.7   PLATELETCT 190 143* 196   MPV 10.9 10.6 10.6     Recent Labs     12/14/21  0520 12/14/21  1230 12/15/21  0645 12/15/21  0645 12/15/21  1201 12/15/21  1815 12/16/21  0400   SODIUM 142   < > 145   < > 147* 149* 150*   POTASSIUM 3.6  --  3.9  --   --   --  3.6   CHLORIDE 106  --  114*  --   --   --  117*   CO2 22  --  21  --   --   --  22   GLUCOSE 98  --  110*  --   --   --  168*   BUN 14  --  11  --   --   --  13   CREATININE 0.65  --  0.41*  --   --   --  0.53   CALCIUM 9.4  --  8.7  --   --   --  9.3    < > = values in this interval not displayed.     Recent Labs     12/14/21  0520   APTT 29.6   INR 0.95          Recent Labs     12/14/21  1230   TRIGLYCERIDE 184*   HDL 48   *        Imaging  DX-CHEST-LIMITED (1 VIEW)   Final Result         1.  No acute cardiopulmonary disease.      CT-HEAD W/O   Final Result   Addendum 1 of 1   These findings were electronically conveyed to and received by NAKUL CLEMENTS on 12/14/2021 4:31 PM.      Final      Slight interval increase in size of LEFT basal ganglia intraparenchymal hemorrhage with worsening surrounding edema and slight worsening midline shift.            DX-ABDOMEN FOR TUBE PLACEMENT   Final Result      Feeding tube tip at the gastric fundus.      CT-HEAD W/O    (Results Pending)       Assessment/Plan  Hypophosphatemia  Assessment & Plan  Replace and monitor.    Elevated liver enzymes  Assessment & Plan  12/16 AST:41,  ALT:62, Alk Phos:100  Monitor CMP    Primary hypertension  Assessment & Plan  Goal SBP <160  Amlodipine 10mg daily, hydralazine 25mg QID  Increase lisinopril to 10mg BID  Monitor vitals.  PRN IV bp meds    Intracranial hemorrhage (HCC)- (present on admission)  Assessment & Plan  ST/PT/OT  FEEs pending  Left sided intracranial hemorrhage on CT  Avoid ASA, NSAIDS and anticoagulation.  Placement   Rehab consult.  BP control goal SBP <160  3% saline being stopped.  Neurology signing off.

## 2021-12-17 ENCOUNTER — PATIENT OUTREACH (OUTPATIENT)
Dept: HEALTH INFORMATION MANAGEMENT | Facility: OTHER | Age: 56
End: 2021-12-17

## 2021-12-17 ENCOUNTER — APPOINTMENT (OUTPATIENT)
Dept: RADIOLOGY | Facility: REHABILITATION | Age: 56
DRG: 057 | End: 2021-12-17
Attending: PHYSICAL MEDICINE & REHABILITATION
Payer: COMMERCIAL

## 2021-12-17 ENCOUNTER — HOSPITAL ENCOUNTER (INPATIENT)
Facility: REHABILITATION | Age: 56
LOS: 12 days | DRG: 057 | End: 2021-12-29
Attending: PHYSICAL MEDICINE & REHABILITATION | Admitting: PHYSICAL MEDICINE & REHABILITATION
Payer: COMMERCIAL

## 2021-12-17 VITALS
BODY MASS INDEX: 24.17 KG/M2 | TEMPERATURE: 97.8 F | OXYGEN SATURATION: 95 % | SYSTOLIC BLOOD PRESSURE: 141 MMHG | DIASTOLIC BLOOD PRESSURE: 83 MMHG | HEIGHT: 65 IN | WEIGHT: 145.06 LBS | HEART RATE: 71 BPM | RESPIRATION RATE: 16 BRPM

## 2021-12-17 DIAGNOSIS — I62.9 INTRACRANIAL HEMORRHAGE (HCC): ICD-10-CM

## 2021-12-17 DIAGNOSIS — E55.9 VITAMIN D DEFICIENCY: ICD-10-CM

## 2021-12-17 DIAGNOSIS — I10 PRIMARY HYPERTENSION: ICD-10-CM

## 2021-12-17 PROBLEM — Z78.9 IMPAIRED MOBILITY AND ADLS: Status: ACTIVE | Noted: 2021-12-17

## 2021-12-17 PROBLEM — E78.49 OTHER HYPERLIPIDEMIA: Status: ACTIVE | Noted: 2021-12-17

## 2021-12-17 PROBLEM — I61.9 HEMORRHAGIC STROKE (HCC): Status: ACTIVE | Noted: 2021-12-17

## 2021-12-17 PROBLEM — Z74.09 IMPAIRED MOBILITY AND ADLS: Status: ACTIVE | Noted: 2021-12-17

## 2021-12-17 PROBLEM — E83.39 HYPOPHOSPHATEMIA: Status: RESOLVED | Noted: 2021-12-16 | Resolved: 2021-12-17

## 2021-12-17 PROBLEM — R73.9 HYPERGLYCEMIA: Status: ACTIVE | Noted: 2021-12-17

## 2021-12-17 PROBLEM — E87.0 HYPERNATREMIA: Status: ACTIVE | Noted: 2021-12-17

## 2021-12-17 PROBLEM — R13.10 DYSPHAGIA: Status: ACTIVE | Noted: 2021-12-17

## 2021-12-17 PROBLEM — D72.829 LEUKOCYTOSIS: Status: ACTIVE | Noted: 2021-12-17

## 2021-12-17 PROBLEM — E87.6 HYPOKALEMIA: Status: ACTIVE | Noted: 2021-12-17

## 2021-12-17 LAB
ALBUMIN SERPL BCP-MCNC: 3.8 G/DL (ref 3.2–4.9)
ALBUMIN/GLOB SERPL: 1.2 G/DL
ALP SERPL-CCNC: 89 U/L (ref 30–99)
ALT SERPL-CCNC: 60 U/L (ref 2–50)
ANION GAP SERPL CALC-SCNC: 13 MMOL/L (ref 7–16)
AST SERPL-CCNC: 34 U/L (ref 12–45)
BASOPHILS # BLD AUTO: 0.4 % (ref 0–1.8)
BASOPHILS # BLD: 0.05 K/UL (ref 0–0.12)
BILIRUB SERPL-MCNC: 0.8 MG/DL (ref 0.1–1.5)
BUN SERPL-MCNC: 19 MG/DL (ref 8–22)
CALCIUM SERPL-MCNC: 9 MG/DL (ref 8.5–10.5)
CHLORIDE SERPL-SCNC: 114 MMOL/L (ref 96–112)
CO2 SERPL-SCNC: 24 MMOL/L (ref 20–33)
CREAT SERPL-MCNC: 0.59 MG/DL (ref 0.5–1.4)
EOSINOPHIL # BLD AUTO: 0.02 K/UL (ref 0–0.51)
EOSINOPHIL NFR BLD: 0.2 % (ref 0–6.9)
ERYTHROCYTE [DISTWIDTH] IN BLOOD BY AUTOMATED COUNT: 50 FL (ref 35.9–50)
EST. AVERAGE GLUCOSE BLD GHB EST-MCNC: 114 MG/DL
GLOBULIN SER CALC-MCNC: 3.3 G/DL (ref 1.9–3.5)
GLUCOSE BLD-MCNC: 87 MG/DL (ref 65–99)
GLUCOSE SERPL-MCNC: 112 MG/DL (ref 65–99)
HBA1C MFR BLD: 5.6 % (ref 4–5.6)
HCT VFR BLD AUTO: 41.7 % (ref 37–47)
HGB BLD-MCNC: 13.6 G/DL (ref 12–16)
IMM GRANULOCYTES # BLD AUTO: 0.06 K/UL (ref 0–0.11)
IMM GRANULOCYTES NFR BLD AUTO: 0.5 % (ref 0–0.9)
LYMPHOCYTES # BLD AUTO: 2.32 K/UL (ref 1–4.8)
LYMPHOCYTES NFR BLD: 20.6 % (ref 22–41)
MAGNESIUM SERPL-MCNC: 2.4 MG/DL (ref 1.5–2.5)
MCH RBC QN AUTO: 29.9 PG (ref 27–33)
MCHC RBC AUTO-ENTMCNC: 32.6 G/DL (ref 33.6–35)
MCV RBC AUTO: 91.6 FL (ref 81.4–97.8)
MONOCYTES # BLD AUTO: 1.22 K/UL (ref 0–0.85)
MONOCYTES NFR BLD AUTO: 10.9 % (ref 0–13.4)
NEUTROPHILS # BLD AUTO: 7.57 K/UL (ref 2–7.15)
NEUTROPHILS NFR BLD: 67.4 % (ref 44–72)
NRBC # BLD AUTO: 0 K/UL
NRBC BLD-RTO: 0 /100 WBC
PHOSPHATE SERPL-MCNC: 4.2 MG/DL (ref 2.5–4.5)
PLATELET # BLD AUTO: 186 K/UL (ref 164–446)
PMV BLD AUTO: 10.7 FL (ref 9–12.9)
POTASSIUM SERPL-SCNC: 3.3 MMOL/L (ref 3.6–5.5)
PROT SERPL-MCNC: 7.1 G/DL (ref 6–8.2)
RBC # BLD AUTO: 4.55 M/UL (ref 4.2–5.4)
SODIUM SERPL-SCNC: 151 MMOL/L (ref 135–145)
TSH SERPL DL<=0.005 MIU/L-ACNC: 0.6 UIU/ML (ref 0.38–5.33)
WBC # BLD AUTO: 11.2 K/UL (ref 4.8–10.8)

## 2021-12-17 PROCEDURE — A9270 NON-COVERED ITEM OR SERVICE: HCPCS | Performed by: STUDENT IN AN ORGANIZED HEALTH CARE EDUCATION/TRAINING PROGRAM

## 2021-12-17 PROCEDURE — 700102 HCHG RX REV CODE 250 W/ 637 OVERRIDE(OP): Performed by: PHYSICAL MEDICINE & REHABILITATION

## 2021-12-17 PROCEDURE — 99223 1ST HOSP IP/OBS HIGH 75: CPT | Performed by: PHYSICAL MEDICINE & REHABILITATION

## 2021-12-17 PROCEDURE — U0003 INFECTIOUS AGENT DETECTION BY NUCLEIC ACID (DNA OR RNA); SEVERE ACUTE RESPIRATORY SYNDROME CORONAVIRUS 2 (SARS-COV-2) (CORONAVIRUS DISEASE [COVID-19]), AMPLIFIED PROBE TECHNIQUE, MAKING USE OF HIGH THROUGHPUT TECHNOLOGIES AS DESCRIBED BY CMS-2020-01-R: HCPCS

## 2021-12-17 PROCEDURE — 84100 ASSAY OF PHOSPHORUS: CPT

## 2021-12-17 PROCEDURE — A9270 NON-COVERED ITEM OR SERVICE: HCPCS | Performed by: PHYSICAL MEDICINE & REHABILITATION

## 2021-12-17 PROCEDURE — 700102 HCHG RX REV CODE 250 W/ 637 OVERRIDE(OP): Performed by: STUDENT IN AN ORGANIZED HEALTH CARE EDUCATION/TRAINING PROGRAM

## 2021-12-17 PROCEDURE — 99239 HOSP IP/OBS DSCHRG MGMT >30: CPT | Performed by: HOSPITALIST

## 2021-12-17 PROCEDURE — 84443 ASSAY THYROID STIM HORMONE: CPT

## 2021-12-17 PROCEDURE — 80053 COMPREHEN METABOLIC PANEL: CPT

## 2021-12-17 PROCEDURE — 36415 COLL VENOUS BLD VENIPUNCTURE: CPT

## 2021-12-17 PROCEDURE — A9270 NON-COVERED ITEM OR SERVICE: HCPCS | Performed by: INTERNAL MEDICINE

## 2021-12-17 PROCEDURE — 700102 HCHG RX REV CODE 250 W/ 637 OVERRIDE(OP): Performed by: HOSPITALIST

## 2021-12-17 PROCEDURE — U0005 INFEC AGEN DETEC AMPLI PROBE: HCPCS

## 2021-12-17 PROCEDURE — 770010 HCHG ROOM/CARE - REHAB SEMI PRIVAT*

## 2021-12-17 PROCEDURE — A9270 NON-COVERED ITEM OR SERVICE: HCPCS | Performed by: HOSPITALIST

## 2021-12-17 PROCEDURE — 700111 HCHG RX REV CODE 636 W/ 250 OVERRIDE (IP): Performed by: HOSPITALIST

## 2021-12-17 PROCEDURE — 700102 HCHG RX REV CODE 250 W/ 637 OVERRIDE(OP): Performed by: INTERNAL MEDICINE

## 2021-12-17 PROCEDURE — 700102 HCHG RX REV CODE 250 W/ 637 OVERRIDE(OP): Performed by: PSYCHIATRY & NEUROLOGY

## 2021-12-17 PROCEDURE — 90686 IIV4 VACC NO PRSV 0.5 ML IM: CPT | Performed by: HOSPITALIST

## 2021-12-17 PROCEDURE — 83735 ASSAY OF MAGNESIUM: CPT

## 2021-12-17 PROCEDURE — 71045 X-RAY EXAM CHEST 1 VIEW: CPT

## 2021-12-17 PROCEDURE — 700105 HCHG RX REV CODE 258: Performed by: PHYSICAL MEDICINE & REHABILITATION

## 2021-12-17 PROCEDURE — 81001 URINALYSIS AUTO W/SCOPE: CPT

## 2021-12-17 PROCEDURE — 94760 N-INVAS EAR/PLS OXIMETRY 1: CPT

## 2021-12-17 PROCEDURE — 85025 COMPLETE CBC W/AUTO DIFF WBC: CPT

## 2021-12-17 PROCEDURE — 83036 HEMOGLOBIN GLYCOSYLATED A1C: CPT

## 2021-12-17 PROCEDURE — A9270 NON-COVERED ITEM OR SERVICE: HCPCS | Performed by: PSYCHIATRY & NEUROLOGY

## 2021-12-17 PROCEDURE — 302136 NUTRITION PUMP: Performed by: GENERAL PRACTICE

## 2021-12-17 PROCEDURE — 82962 GLUCOSE BLOOD TEST: CPT

## 2021-12-17 RX ORDER — AMLODIPINE BESYLATE 5 MG/1
10 TABLET ORAL
Status: CANCELLED | OUTPATIENT
Start: 2021-12-17

## 2021-12-17 RX ORDER — LISINOPRIL 10 MG/1
10 TABLET ORAL 2 TIMES DAILY
Qty: 30 TABLET | Status: ON HOLD
Start: 2021-12-17 | End: 2021-12-28

## 2021-12-17 RX ORDER — ALUMINA, MAGNESIA, AND SIMETHICONE 2400; 2400; 240 MG/30ML; MG/30ML; MG/30ML
20 SUSPENSION ORAL
Status: DISCONTINUED | OUTPATIENT
Start: 2021-12-17 | End: 2021-12-17

## 2021-12-17 RX ORDER — ACETAMINOPHEN 325 MG/1
650 TABLET ORAL EVERY 4 HOURS PRN
Qty: 30 TABLET | Refills: 0 | Status: ON HOLD
Start: 2021-12-17 | End: 2021-12-28

## 2021-12-17 RX ORDER — ALUMINA, MAGNESIA, AND SIMETHICONE 2400; 2400; 240 MG/30ML; MG/30ML; MG/30ML
20 SUSPENSION ORAL
Status: DISCONTINUED | OUTPATIENT
Start: 2021-12-17 | End: 2021-12-29 | Stop reason: HOSPADM

## 2021-12-17 RX ORDER — SODIUM CHLORIDE 450 MG/100ML
INJECTION, SOLUTION INTRAVENOUS
Status: COMPLETED | OUTPATIENT
Start: 2021-12-17 | End: 2021-12-17

## 2021-12-17 RX ORDER — LABETALOL HYDROCHLORIDE 5 MG/ML
10-20 INJECTION, SOLUTION INTRAVENOUS EVERY 4 HOURS PRN
Refills: 0 | Status: ON HOLD
Start: 2021-12-17 | End: 2021-12-28

## 2021-12-17 RX ORDER — HYDRALAZINE HYDROCHLORIDE 25 MG/1
25 TABLET, FILM COATED ORAL EVERY 6 HOURS
Qty: 90 TABLET | Status: ON HOLD
Start: 2021-12-17 | End: 2021-12-28

## 2021-12-17 RX ORDER — AMOXICILLIN 250 MG
2 CAPSULE ORAL 2 TIMES DAILY
Status: CANCELLED | OUTPATIENT
Start: 2021-12-17

## 2021-12-17 RX ORDER — LISINOPRIL 10 MG/1
10 TABLET ORAL TWICE DAILY
Status: CANCELLED | OUTPATIENT
Start: 2021-12-17

## 2021-12-17 RX ORDER — ONDANSETRON 4 MG/1
4 TABLET, ORALLY DISINTEGRATING ORAL 4 TIMES DAILY PRN
Status: DISCONTINUED | OUTPATIENT
Start: 2021-12-17 | End: 2021-12-29 | Stop reason: HOSPADM

## 2021-12-17 RX ORDER — AMOXICILLIN 250 MG
2 CAPSULE ORAL 2 TIMES DAILY
Status: DISCONTINUED | OUTPATIENT
Start: 2021-12-17 | End: 2021-12-20

## 2021-12-17 RX ORDER — ONDANSETRON 2 MG/ML
4 INJECTION INTRAMUSCULAR; INTRAVENOUS 4 TIMES DAILY PRN
Status: DISCONTINUED | OUTPATIENT
Start: 2021-12-17 | End: 2021-12-29 | Stop reason: HOSPADM

## 2021-12-17 RX ORDER — AMLODIPINE BESYLATE 5 MG/1
10 TABLET ORAL
Status: DISCONTINUED | OUTPATIENT
Start: 2021-12-18 | End: 2021-12-20

## 2021-12-17 RX ORDER — BISACODYL 10 MG
10 SUPPOSITORY, RECTAL RECTAL
Status: DISCONTINUED | OUTPATIENT
Start: 2021-12-17 | End: 2021-12-20

## 2021-12-17 RX ORDER — HYDRALAZINE HYDROCHLORIDE 20 MG/ML
10-20 INJECTION INTRAMUSCULAR; INTRAVENOUS EVERY 4 HOURS PRN
Refills: 0 | Status: ON HOLD
Start: 2021-12-17 | End: 2021-12-28

## 2021-12-17 RX ORDER — POLYVINYL ALCOHOL 14 MG/ML
1 SOLUTION/ DROPS OPHTHALMIC PRN
Status: DISCONTINUED | OUTPATIENT
Start: 2021-12-17 | End: 2021-12-28

## 2021-12-17 RX ORDER — HYDROXYZINE HYDROCHLORIDE 25 MG/1
50 TABLET, FILM COATED ORAL EVERY 6 HOURS PRN
Status: DISCONTINUED | OUTPATIENT
Start: 2021-12-17 | End: 2021-12-28

## 2021-12-17 RX ORDER — HYDRALAZINE HYDROCHLORIDE 25 MG/1
25 TABLET, FILM COATED ORAL EVERY 6 HOURS
Status: CANCELLED | OUTPATIENT
Start: 2021-12-17

## 2021-12-17 RX ORDER — TRAZODONE HYDROCHLORIDE 50 MG/1
50 TABLET ORAL
Status: DISCONTINUED | OUTPATIENT
Start: 2021-12-17 | End: 2021-12-29 | Stop reason: HOSPADM

## 2021-12-17 RX ORDER — ONDANSETRON 4 MG/1
4 TABLET, ORALLY DISINTEGRATING ORAL 4 TIMES DAILY PRN
Status: DISCONTINUED | OUTPATIENT
Start: 2021-12-17 | End: 2021-12-17

## 2021-12-17 RX ORDER — DEXTROSE MONOHYDRATE 25 G/50ML
50 INJECTION, SOLUTION INTRAVENOUS
Status: DISCONTINUED | OUTPATIENT
Start: 2021-12-17 | End: 2021-12-20

## 2021-12-17 RX ORDER — HYDRALAZINE HYDROCHLORIDE 25 MG/1
25 TABLET, FILM COATED ORAL EVERY 6 HOURS
Status: DISCONTINUED | OUTPATIENT
Start: 2021-12-17 | End: 2021-12-20

## 2021-12-17 RX ORDER — LISINOPRIL 5 MG/1
10 TABLET ORAL TWICE DAILY
Status: DISCONTINUED | OUTPATIENT
Start: 2021-12-17 | End: 2021-12-19

## 2021-12-17 RX ORDER — ONDANSETRON 2 MG/ML
4 INJECTION INTRAMUSCULAR; INTRAVENOUS 4 TIMES DAILY PRN
Status: DISCONTINUED | OUTPATIENT
Start: 2021-12-17 | End: 2021-12-17

## 2021-12-17 RX ORDER — POLYETHYLENE GLYCOL 3350 17 G/17G
1 POWDER, FOR SOLUTION ORAL
Status: CANCELLED | OUTPATIENT
Start: 2021-12-17

## 2021-12-17 RX ORDER — ACETAMINOPHEN 325 MG/1
650 TABLET ORAL EVERY 4 HOURS PRN
Status: DISCONTINUED | OUTPATIENT
Start: 2021-12-17 | End: 2021-12-29 | Stop reason: HOSPADM

## 2021-12-17 RX ORDER — AMLODIPINE BESYLATE 10 MG/1
10 TABLET ORAL DAILY
Qty: 30 TABLET | Status: ON HOLD
Start: 2021-12-18 | End: 2021-12-28

## 2021-12-17 RX ORDER — ACETAMINOPHEN 325 MG/1
650 TABLET ORAL EVERY 4 HOURS PRN
Status: DISCONTINUED | OUTPATIENT
Start: 2021-12-17 | End: 2021-12-17

## 2021-12-17 RX ORDER — LANOLIN ALCOHOL/MO/W.PET/CERES
3 CREAM (GRAM) TOPICAL NIGHTLY PRN
Status: DISCONTINUED | OUTPATIENT
Start: 2021-12-17 | End: 2021-12-17

## 2021-12-17 RX ORDER — POLYETHYLENE GLYCOL 3350 17 G/17G
1 POWDER, FOR SOLUTION ORAL
Status: DISCONTINUED | OUTPATIENT
Start: 2021-12-17 | End: 2021-12-20

## 2021-12-17 RX ORDER — ECHINACEA PURPUREA EXTRACT 125 MG
2 TABLET ORAL PRN
Status: DISCONTINUED | OUTPATIENT
Start: 2021-12-17 | End: 2021-12-29 | Stop reason: HOSPADM

## 2021-12-17 RX ORDER — LANOLIN ALCOHOL/MO/W.PET/CERES
3 CREAM (GRAM) TOPICAL NIGHTLY PRN
Status: DISCONTINUED | OUTPATIENT
Start: 2021-12-17 | End: 2021-12-29 | Stop reason: HOSPADM

## 2021-12-17 RX ORDER — HYDROXYZINE HYDROCHLORIDE 25 MG/1
50 TABLET, FILM COATED ORAL EVERY 6 HOURS PRN
Status: DISCONTINUED | OUTPATIENT
Start: 2021-12-17 | End: 2021-12-17

## 2021-12-17 RX ORDER — LACTULOSE 20 G/30ML
30 SOLUTION ORAL
Status: DISCONTINUED | OUTPATIENT
Start: 2021-12-17 | End: 2021-12-17

## 2021-12-17 RX ORDER — MIDAZOLAM HYDROCHLORIDE 5 MG/ML
5 INJECTION INTRAMUSCULAR; INTRAVENOUS PRN
Status: DISCONTINUED | OUTPATIENT
Start: 2021-12-17 | End: 2021-12-29 | Stop reason: HOSPADM

## 2021-12-17 RX ORDER — LACTULOSE 20 G/30ML
30 SOLUTION ORAL
Status: DISCONTINUED | OUTPATIENT
Start: 2021-12-17 | End: 2021-12-29 | Stop reason: HOSPADM

## 2021-12-17 RX ORDER — BISACODYL 10 MG
10 SUPPOSITORY, RECTAL RECTAL
Status: CANCELLED | OUTPATIENT
Start: 2021-12-17

## 2021-12-17 RX ORDER — ENEMA 19; 7 G/133ML; G/133ML
1 ENEMA RECTAL
Status: DISCONTINUED | OUTPATIENT
Start: 2021-12-17 | End: 2021-12-29 | Stop reason: HOSPADM

## 2021-12-17 RX ORDER — TRAZODONE HYDROCHLORIDE 50 MG/1
50 TABLET ORAL
Status: DISCONTINUED | OUTPATIENT
Start: 2021-12-17 | End: 2021-12-17

## 2021-12-17 RX ADMIN — LISINOPRIL 10 MG: 10 TABLET ORAL at 04:56

## 2021-12-17 RX ADMIN — HYDRALAZINE HYDROCHLORIDE 25 MG: 25 TABLET, FILM COATED ORAL at 17:31

## 2021-12-17 RX ADMIN — INFLUENZA A VIRUS A/VICTORIA/2570/2019 IVR-215 (H1N1) ANTIGEN (FORMALDEHYDE INACTIVATED), INFLUENZA A VIRUS A/TASMANIA/503/2020 IVR-221 (H3N2) ANTIGEN (FORMALDEHYDE INACTIVATED), INFLUENZA B VIRUS B/PHUKET/3073/2013 ANTIGEN (FORMALDEHYDE INACTIVATED), AND INFLUENZA B VIRUS B/WASHINGTON/02/2019 ANTIGEN (FORMALDEHYDE INACTIVATED) 0.5 ML: 15; 15; 15; 15 INJECTION, SUSPENSION INTRAMUSCULAR at 12:15

## 2021-12-17 RX ADMIN — HYDRALAZINE HYDROCHLORIDE 25 MG: 25 TABLET, FILM COATED ORAL at 04:56

## 2021-12-17 RX ADMIN — AMLODIPINE BESYLATE 10 MG: 10 TABLET ORAL at 04:56

## 2021-12-17 RX ADMIN — HYDRALAZINE HYDROCHLORIDE 25 MG: 25 TABLET, FILM COATED ORAL at 12:33

## 2021-12-17 RX ADMIN — SODIUM CHLORIDE: 4.5 INJECTION, SOLUTION INTRAVENOUS at 14:52

## 2021-12-17 RX ADMIN — LISINOPRIL 10 MG: 5 TABLET ORAL at 17:31

## 2021-12-17 RX ADMIN — DOCUSATE SODIUM 50 MG AND SENNOSIDES 8.6 MG 2 TABLET: 8.6; 5 TABLET, FILM COATED ORAL at 04:55

## 2021-12-17 RX ADMIN — SENNOSIDES AND DOCUSATE SODIUM 2 TABLET: 50; 8.6 TABLET ORAL at 21:45

## 2021-12-17 ASSESSMENT — LIFESTYLE VARIABLES
EVER HAD A DRINK FIRST THING IN THE MORNING TO STEADY YOUR NERVES TO GET RID OF A HANGOVER: NO
EVER_SMOKED: NEVER
TOTAL SCORE: 0
TOTAL SCORE: 0
HAVE PEOPLE ANNOYED YOU BY CRITICIZING YOUR DRINKING: NO
HOW MANY TIMES IN THE PAST YEAR HAVE YOU HAD 5 OR MORE DRINKS IN A DAY: 0
ON A TYPICAL DAY WHEN YOU DRINK ALCOHOL HOW MANY DRINKS DO YOU HAVE: 0
TOTAL SCORE: 0
EVER FELT BAD OR GUILTY ABOUT YOUR DRINKING: NO
ALCOHOL_USE: NO
CONSUMPTION TOTAL: NEGATIVE
HAVE YOU EVER FELT YOU SHOULD CUT DOWN ON YOUR DRINKING: NO
AVERAGE NUMBER OF DAYS PER WEEK YOU HAVE A DRINK CONTAINING ALCOHOL: 0

## 2021-12-17 ASSESSMENT — FIBROSIS 4 INDEX: FIB4 SCORE: 1.37

## 2021-12-17 ASSESSMENT — PAIN DESCRIPTION - PAIN TYPE
TYPE: ACUTE PAIN
TYPE: ACUTE PAIN

## 2021-12-17 ASSESSMENT — PATIENT HEALTH QUESTIONNAIRE - PHQ9
1. LITTLE INTEREST OR PLEASURE IN DOING THINGS: NOT AT ALL
2. FEELING DOWN, DEPRESSED, IRRITABLE, OR HOPELESS: NOT AT ALL
SUM OF ALL RESPONSES TO PHQ9 QUESTIONS 1 AND 2: 0

## 2021-12-17 NOTE — CARE PLAN
The patient is Stable - Low risk of patient condition declining or worsening    Shift Goals  Clinical Goals: Monitor neuro status, SBP<160  Patient Goals: rest  Family Goals: eat liquidized diet and rest    Progress made toward(s) clinical / shift goals:  Patient is A&Ox4. VSS and within parameters. Q4 hour neuro checks in place. Bed is low and locked. Bed alarm on. Educated on use of call light. Call light within reach. Hourly rounding continues.     Patient is not progressing towards the following goals:

## 2021-12-17 NOTE — THERAPY
"Speech Language Pathology   Fiberoptic Endoscopic Evaluation of Swallow     Patient Name: Maryjane Gray  AGE:  56 y.o., SEX:  female  Medical Record #: [id number]  Today's Date: 12/16/2021     Assessment    Discussed with the patient and her family regarding the risks, benefits, and alternatives of the FEES procedure. Patient acknowledges and agreeable to proceed with the procedure. Patient tolerated procedure without difficulty. Overall study was technically difficult 2/2 patient's anatomy and cortrak placement hindering visualization at times during study. The patient was given PO trials of  ice chips, thin liquid, mildly thick liquid, liquidized, puree, minced/moist solids, and soft/bite sized solids.     Results:      Clinical Impressions:    Mild-moderate oropharyngeal dysphagia, marked by decreased bolus control of liquids and prolonged/inefficient mastication of chewable solids. Thins with deep layrngeal penetration before the swallow and trace SILENT aspiration suspected during the swallow. Dysphagia is likely acute related to BG hemmorhage. Swallow safety is impaired; swallow efficiency is impaired. Pt appears to be at mild risk for aspiration PNA, and moderate risk for malnutrition/dehydration if swallow strategies are not followed. Short-term diet modification is indicated. Swallow prognosis is excellent given improvements noted thus far during dysphagia therapy. Patient appears to be a good candidate for behavioral swallow rehabilitation.    Recommendations:  Initiation of diet liquidized with mildly thick liquids.   Supervision: Direct supervision, patient may be left alone for less than 5 minutes at a time  Positioning: Seat fully upright and midline when eating  Medication: Crushed in puree  Swallow Techniques: small bites and sips, swallow 2 times per bite/sip and check mouth for \"pocketing\" of food periodically during meal  Oral Care: Perform good oral cares at least 3-4 times a " day      Plan    Recommend Speech Therapy 5 times per week until therapy goals are met for the following treatments:  Dysphagia Training.    Discharge Recommendations: Recommend post-acute placement for additional speech therapy services prior to discharge home       Objective       12/16/21 1535   FEES Evaluation Prior To Procedure   Onset Date Of Dysphagia 12/14/21   Dysphagia Symptoms Warranting Video Swallow L BG hemorrhage with concern for silent aspiration at bedside.    Procedure Performed While Patient Sitting In Bed   Seated at (Degrees) 90   A Flexible Endoscope Was Introduced Transnasally In The Left Nare   FEES  Anatomy Assessment   Anatomy For A Functional Swallow: Small Vallecular Space  (almost absent vallcular space)   FEES Laryngeal Adduction Assessment   Breath Holding Adequate   Clearing Throat Adequate   Cough Adequate   Phonation Adequate   Onset Of Swallow Inadequate  (inconsistent )   FEES Velopharyngeal Assessment    Velopharyngeal Closure: Adequate   FEES Sensation Assessment    Presence of Scope Adequate   Presence of Residue Inadequate   Presence of Penetration Absent Response   Presence of Aspiration Delayed Response   FEES Swallow Function Assessment   Swallow Trigger Impaired  (backside of epiglottis)   Base of Tongue Retraction Impaired   Pharyngeal Constrictor Movement Impaired   White Out Phase Incomplete White Out   Epiglottic Movement Little / No Movement   Laryngeal Elevation Functional   Cricopharyngeal Function Functional   Swallow Observations / Symptoms   Swallow Observations / Symptoms Yes   Pyriform Sinus Residue Soft & Bite-Sized (6) - (Dysphagia III);Minced & Moist (5) - (Dysphagia II)   Posterior Pharyngeal Wall Residue Pureed (4);Minced & Moist (5) - (Dysphagia II)   Penetration Suspected During the Swallow Thin (0)   Thin (0) Teaspoon;Cup   Aspiration Suspected During the Swallow Thin (0)   Thin (0) Cup   Compensatory Strategies Attempted   Compensatory Strategies  "Attempted Yes   Multiple Swallows Cleared residue but pt needed cues   Effortful Swallows No overt improvement    Controlled Bolus Size Decreased degree of penetration    Patient Ability To Protect Airway   Patient Ability To Protect Airway  Inconsistent   Penetration Aspiration Scale   Penetration Aspiration Scale 7 - Material passes glottis but is not ejected from airway, visible subglottic stasis despite patient's response   [place] Pharyngeal Residue Severity   Vallecular Residue None, no residue   Pyriform Sinus Residue  Mild, up wall to ¼ full   Dysphagia Rating   Nutritional Liquid Intake Rating Scale Thickened beverages (mildly thick unless otherwise specified)   Nutritional Food Intake Rating Scale Total oral diet of a single consistency   Problem List   Problem List Dysphagia   Evaluation Recommendations   Diet / Liquid Recommendation Liquidised (3) - (Nectar Thick Full Liquid);Mildly Thick (2) - (Nectar Thick)   Medication Administration  Crush all Medications in Puree   Patient / Family Goals   Patient / Family Goal #1 'How long is she going to be here?\" - at bedside    Short Term Goals   Short Term Goal # 1 The pt will consume prefeeding trials with no overt s/sx of aspiration    Goal Outcome # 1 Goal met, new goal added   Short Term Goal # 1 B  The pt will utilize swallow strategies during LQ3/MT2 meal items with no overt s/s of aspiration or difficulty given min cues         "

## 2021-12-17 NOTE — DOCUMENTATION QUERY
[facility]                                                                       Query Response Note      PATIENT:               BRITTANY COUCH  ACCT #:                  [id number]  MRN:                     [id number]  :                      1965  ADMIT DATE:       2021 7:18 AM  DISCH DATE:          RESPONDING  PROVIDER #:        [id number]           QUERY TEXT:    Vasogenic edema is noted on the CT Scan of . The Progress Notes are not clear regarding the clinical significance of this condition. Please clarify the clinical relevance of vasogenic edema.     NOTE: If an appropriate response is not listed below, please respond with a new note.    The patient's clinical indicators include:  Per Neurology Consultation   -currently on 3% hypertonic saline.   -I do not feel that this is necessarily warranted at this time given minimal edema and small size of the hemorrhage.     Per Progress Note   -Intracranial hemorrhage  -3% NS for edema    Per Progress Note 12/15  -Weaning cardene gtt.   -Can likely liberate from HTS gtt soon  -Intracranial hemorrhage   -Slight Midline shift with surrounding edema --stable     Results Review:   CT Head  0536:   1.  Left basal ganglia hemorrhage with surrounding mild vasogenic edema  2.  Mass effect with partial effacement of the left ankle  CT Head  1622:   Slight interval increase in size of LEFT basal ganglia intraparenchymal hemorrhage with worsening surrounding edema and slight worsening midline shift  CT Head  1250:   1.  Unchanged size of the left basal ganglia intraparenchymal hemorrhage with mildly increased adjacent edema.  2.  Mildly increased mass effect on the left lateral ventricle.  3.  2 mm of left-to-right midline shift.    Treatment:   Admission to ICU, 3% saline infusion  0821 to 1529 &  @ 1740 to  @ 0830, repeat CT head, & Neuro checks  q1hr    Risk Factors:   Intracranial hemorrhage for left basal ganglia secondary to uncontrolled hypertension, aphasia, & right sided weakness    Thank You,  Roopa Kohler RN BSN  Clinical   Connect via [employer] Messenger  Options provided:   -- Vasogenic edema is present and a clinical relevant condition   -- Vasogenic edema is clinically insignificant   -- Unable to determine      Query created by: Roopa Kohler on 12/16/2021 1:49 PM    RESPONSE TEXT:    Vasogenic edema is present and a clinical relevant condition          Electronically signed by:  RANDI WATTS MD 12/17/2021 7:49 AM

## 2021-12-17 NOTE — PROGRESS NOTES
4 Eyes Skin Assessment Completed by ROSALIND Marc and ROSALIND Best.    Head WDL  Ears WDL  Nose WDL  Mouth WDL  Neck WDL  Breast/Chest WDL  Shoulder Blades Redness and Blanching  Spine Redness and Blanching  (R) Arm/Elbow/Hand WDL  (L) Arm/Elbow/Hand WDL  Abdomen WDL  Groin WDL  Scrotum/Coccyx/Buttocks Redness and Blanching  (R) Leg WDL  (L) Leg Bruising  (R) Heel/Foot/Toe WDL  (L) Heel/Foot/Toe WDL          Devices In Places Blood Pressure Cuff and Pulse Ox      Interventions In Place Pillows, Q2 Turns and Pressure Redistribution Mattress    Possible Skin Injury No    Pictures Uploaded Into Epic N/A  Wound Consult Placed N/A  RN Wound Prevention Protocol Ordered No

## 2021-12-17 NOTE — H&P
"REHABILITATION HISTORY AND PHYSICAL/POST ADMISSION EVALUATION    12/17/2021  1:55 PM  Maryjane Gray  RH01/02  Admission  12/17/2021 12:55 PM  [facility] Code/Reason for admission: 0001.2 - Stroke: Right Body Involvement (Left Brain)   Etiologic diagnosis/problem: Hemorrhagic stroke (HCC)  Chief Complaint: right sided weakness    HPI:  Per Dr. Schmidt consult \"The patient is a 56 y.o.  female with unknown past medical history (patient aphasic and unable to list PMHx);  who presented on 12/14/2021  7:18 AM with acute onset right-sided weakness.  Per documentation, patient originally presented to [facility] emergency department after abrupt onset right-sided weakness.  Upon presentation to [facility] reportedly patient's blood pressure was > 180 at [facility] CT head was obtained which showed a 3 to 5 cm left subcortical hemorrhage land patient was transferred to [facility] for higher level of care.  Neurology was consulted, etiology of hemorrhage appeared to be hypertensive in nature.  Suspected aphasia was secondary to hemorrhage proximity to the cortex.  CT head images reviewed by neurology and there is no significant mass-effect or edema.  Patient was admitted to the ICU, placed on nicardipine drip with BP goals of less than 160/9.  Repeat CT head is pending, PT/OT evaluations pending.  Patient has been seen by SLP and is currently n.p.o. with core track in place.\"    HgbA1c 5.6, , hypernatremia 151.  Patient continues with NG tube due to her dysphagia.    Patient current reports right-sided weakness.  She is right-hand dominant.  She denies a past medical history was only taking aspirin.  She denies any pain.  She denies any issues with her bowel or bladder.  She wants to know when her feeding tube will be removed.  Noted to have hypernatremia on admission.  Discussed with the hospitalist, will start half-normal saline    Patient was evaluated by Rehab Medicine physician and Physical Therapy, " Occupational Therapy and Speech Therapy and determined to be appropriate for acute inpatient rehab and was transferred to [facility] on 12/17/2021 12:55 PM.    With this acute therapeutic intervention, this patient hopes to improve her functional status, and return to independent living with the supportive care of family.    REVIEW OF SYSTEMS:     A complete review of systems was performed and was negative in detail with the exception of items mentioned elsewhere in this document.    PMH:  No past medical history on file.   No past medical history.    PSH:  Past Surgical History:   Procedure Laterality Date   • ABDOMINAL HYSTERECTOMY TOTAL         No family history on file.     MEDICATIONS:  Current Facility-Administered Medications   Medication Dose   • hydrOXYzine HCl (ATARAX) tablet 50 mg  50 mg   • melatonin tablet 3 mg  3 mg   • Respiratory Therapy Consult     • Pharmacy Consult Request ...Pain Management Review 1 Each  1 Each   • acetaminophen (Tylenol) tablet 650 mg  650 mg   • lactulose 20 GM/30ML solution 30 mL  30 mL   • docusate sodium (ENEMEEZ) enema 283 mg  283 mg   • sodium phosphate (Fleet) enema 133 mL  1 Each   • artificial tears ophthalmic solution 1 Drop  1 Drop   • benzocaine-menthol (CEPACOL) lozenge 1 Lozenge  1 Lozenge   • mag hydrox-al hydrox-simeth (MAALOX PLUS ES or MYLANTA DS) suspension 20 mL  20 mL   • ondansetron (ZOFRAN ODT) dispertab 4 mg  4 mg    Or   • ondansetron (ZOFRAN) syringe/vial injection 4 mg  4 mg   • traZODone (DESYREL) tablet 50 mg  50 mg   • sodium chloride (OCEAN) 0.65 % nasal spray 2 Spray  2 Spray   • midazolam (VERSED) 5 mg/mL (1 mL vial)  5 mg   • senna-docusate (PERICOLACE or SENOKOT S) 8.6-50 MG per tablet 2 Tablet  2 Tablet    And   • polyethylene glycol/lytes (MIRALAX) PACKET 1 Packet  1 Packet    And   • magnesium hydroxide (MILK OF MAGNESIA) suspension 30 mL  30 mL    And   • bisacodyl (DULCOLAX) suppository 10 mg  10 mg   • amLODIPine  "(NORVASC) tablet 10 mg  10 mg   • hydrALAZINE (APRESOLINE) tablet 25 mg  25 mg   • lisinopril (PRINIVIL) tablet 10 mg  10 mg       ALLERGIES:  Patient has no known allergies.    PSYCHOSOCIAL HISTORY:  Pre-mobidly, the patient lived in a two level home with 0 steps to enter, in [place] with spouse.  Patient is a  working with small strides.  She is a 32-year-old daughter.  Denies tobacco alcohol and drugs.    LEVEL OF FUNCTION PRIOR TO DISABILTY:  Independent, working    LEVEL OF FUNCTION PRIOR TO ADMISSION to [facility]:  PT:    Gait Level Of Assist: Minimal Assist  Assistive Device: None  Distance (Feet): 50  Deviation: Decreased Base Of Support,Decreased Heel Strike,Decreased Toe Off,Bradykinetic  # of Stairs Climbed: 0  Weight Bearing Status: No restrictions  Supine to Sit: Minimal Assist  Sit to Supine:  (Up in chair post assessment)  Scooting: Supervised  Sit to Stand: Minimal Assist  Bed, Chair, Wheelchair Transfer: Minimal Assist  Toilet Transfers: Refused  Transfer Method: Stand Step  Sitting in Chair: left seated in chair  Sitting Edge of Bed: 5 min  Standing: 10 min    OT:    Upper Body Dressing: Minimal Assist  Lower Body Dressing: Maximal Assist  Toileting:  (NT-refused need)    SLP:    Problem List: Dysphagia  Diet / Liquid Recommendation: NPO,Pre-Feeding Trials with SLP Only  Expressive aphasia  CURRENT LEVEL OF FUNCTION:   Same as level of function prior to admission to [facility]    PHYSICAL EXAM:     VITAL SIGNS:   height is 1.626 m (5' 4\") and weight is 65 kg (143 lb 4.8 oz). Her temporal temperature is 37 °C (98.6 °F). Her blood pressure is 137/80 and her pulse is 77. Her respiration is 18.     GENERAL: No apparent distress  HEENT: Normocephalic/atraumatic, NG in nare  CARDIAC: Regular rate and rhythm, normal S1, S2, no murmurs, no peripheral edema   LUNGS: Clear to auscultation, normal respiratory effort, on room air   ABDOMINAL: bowel sounds " present, soft, nontender and nondistended    EXTREMITIES: no spasticity or no edema  MSK: No joint swelling    NEURO:    Mental status: alert  Speech: fluent, mild expressive aphasia with word finding difficulties, mild dysarthria    CRANIAL NERVES:  2,3: visual acuity grossly intact  5: intact in all branches  7: Right facial droop  8: hearing grossly intact  9,10: symmetric palate elevation  11: SCM/Trapezius strength 5/5 on the left, 2 out of 5 on the right  12: tongue protrudes midline    Motor:  Shoulder flexors:  Right -  2/5, Left -  5/5  Elbow flexors:  Right -  3/5, Left -  5/5  Elbow extensors:  Right -  3/5, Left -  5/5  Weak  on right  Hip flexors:  Right -  4/5, Left -  5/5  Dorsiflexors:  Right -  4/5, Left -  5/5  Plantar flexors:  Right -  4/5, Left -  5/5     Sensory:   intact to light touch through out      RADIOLOGY:    12/14/2021 5:36 AM     HISTORY/REASON FOR EXAM: Neuro deficit, acute, stroke suspected; cva     TECHNIQUE/EXAM DESCRIPTION:  CT of the head without contrast.     Sequential axial images were obtained from the vertex to the skull base without contrast.     Up to date radiation dose reduction adjustments have been utilized to meet ALARA standards for radiation dose reduction.     COMPARISON: December 14, 2021     FINDINGS:     The brain appears normal in volume and morphology. No space occupying lesions or areas of acute vascular territory infarctions are identified. There is 1.9 x 4.1 x 2.6 cm hemorrhage identified in the left basal ganglia with surrounding low density.   Partial effacement left ventricle is seen without apparent shift.     The visualized paranasal sinuses and mastoid air cells are well aerated bilaterally. No depressed calvarial fractures are identified. The visualized globes and retrobulbar soft tissues appear within normal limits.     IMPRESSION:        1.  Left basal ganglia hemorrhage with surrounding mild vasogenic edema  2.  Mass effect with partial  effacement of the left ankle                           LABS:  Recent Labs     12/15/21  0645 12/15/21  1201 12/15/21  1815 12/16/21  0400 12/17/21  0128   SODIUM 145   < > 149* 150* 151*   POTASSIUM 3.9  --   --  3.6 3.3*   CHLORIDE 114*  --   --  117* 114*   CO2 21  --   --  22 24   GLUCOSE 110*  --   --  168* 112*   BUN 11  --   --  13 19   CREATININE 0.41*  --   --  0.53 0.59   CALCIUM 8.7  --   --  9.3 9.0    < > = values in this interval not displayed.     Recent Labs     12/15/21  0600 12/16/21  0400 12/17/21  0128   WBC 9.2 11.9* 11.2*   RBC 4.59 4.92 4.55   HEMOGLOBIN 14.1 15.1 13.6   HEMATOCRIT 41.7 45.4 41.7   MCV 90.8 92.3 91.6   MCH 30.7 30.7 29.9   MCHC 33.8 33.3* 32.6*   RDW 47.1 48.7 50.0   PLATELETCT 143* 196 186   MPV 10.6 10.6 10.7         PRIMARY REHAB DIAGNOSIS:    This patient is a 56 y.o. female admitted for acute inpatient rehabilitation with Hemorrhagic stroke (HCC).    IMPAIRMENTS:   Cognitive  ADLs/IADLs  Mobility  Speech  Swallow    SECONDARY DIAGNOSIS/MEDICAL CO-MORBIDITIES AFFECTING FUNCTION:    Hypertension  Hyperlipidemia  Hypernatremia  Leukocytosis  Hyperglycemia    RELEVANT CHANGES SINCE PREADMISSION EVALUATION:    Status unchanged    The patient's rehabilitation potential is Excellent  The patient's medical prognosis is excellent    PLAN:   Discussion and Recommendations, discussed with the patient and/or family:   1. The patient requires an acute inpatient rehabilitation program with a coordinated program of care at an intensity and frequency not available at a lower level of care. This recommendation is substantiated by the patient's medical physicians who recommend that the patient's intervention and assessment of medical issues needs to be done at an acute level of care for patient's safety and maximum outcome.     2. A coordinated program of care will be supplied by an interdisciplinary team of physical therapy, occupational therapy, rehab physician, rehab nursing, and, if  needed, speech therapy and rehab psychology. Rehab team presents a patient-specific rehabilitation and education program concentrating on prevention of future problems related to accessibility, mobility, skin, bowel, bladder, sexuality, and psychosocial and medical/surgical problems.     3. Need for Rehabilitation Physician: The rehab physician will be evaluating the patient on a multi-weekly basis to help coordinate the program of care. The rehab physician communicates between medical physicians, therapists, and nurses to maximize the patient's potential outcome. Specific areas in which the rehab physician will be providing daily assessment include the following:   A. Assessing the patient's heart rate and blood pressure response (vitals monitoring) to activity and making adjustments in medications or conservative measures as needed.   B. The rehab physician will be assessing the frequency at which the program can be increased to allow the patient to reach optimal functional outcome.   C. The rehab physician will also provide assessments in daily skin care, especially in light of patient's impairments in mobility.   D. The rehab physician will provide special expertise in understanding how to work with functional impairment and recommend appropriate interventions, compensatory techniques, and education that will facilitate the patient's outcome.     4. Rehab R.N.   The rehab RN will be working with patient to carry over in room mobility and activities of daily living when the patient is not in 3 hours of skilled therapy. Rehab nursing will be working in conjunction with rehab physician to address all the medical issues above and continue to assess laboratory work and discuss abnormalities with the treating physicians, assess vitals, and response to activity, and discuss and report abnormalities with the rehab physician. Rehab RN will also continue daily skin care, supervise bladder/bowel program, instruct in  medication administration, and ensure patient safety.     5. Therapies to treat at intensity and frequency of (may change after completion of evaluation by all therapeutic disciplines):       PT:  Physical therapy to address mobility, transfer, gait training and evaluation for adaptive equipment needs 1hour/day at least 5 days/week for the duration of the ELOS (see below)       OT:  Occupational therapy to address ADLs, self-care, home management training, functional mobility/transfers and assistive device evaluation, and community re-integration 1hour/day at least 5 days/week for the duration of the ELOS (see below).        ST/Dysphagia:  Speech therapy to address speech, language, and cognitive deficits as well as swallowing difficulties with retraining/dysphagia management and community re-integration with comprehension, expression, cognitive training 1hour/day at least 5 days/week for the duration of the ELOS (see below).     6. Medical management / Rehabilitation Issues/Adverse Potential affecting function as part of rehabilitation plan.    Hypertension  Amlodipine  Hydralazine  Lisinopril  Consult hospitalist    Hyperlipidemia  Statin discontinue due to elevated liver enzymes    Transaminitis  Check am labs    Hypernatremia  Start 1/2 normal saline  Consult hospitalist    Leukocytosis  Check UA and CXR    Hyperglycemia  Likely from tube feeds  SSI  HgbA1c 5.4    I performed a complete drug regimen review and did not identify any potential clinically significant medication issues.    The patient's CODE STATUS was confirmed as FULL CODE on admission, with the patient and/or family at bedside.    REHABILITATION ISSUES/ADVERSE POTENTIAL:  1.  CVA (Cerebrovascular Accident): Continue lipid and blood pressure management. Patient demonstrates functional deficits in strength, balance, coordination, and ADL's. Patient is admitted to [facility] for comprehensive rehabilitation therapy as described  below.   Rehabilitation nursing monitors bowel and bladder control, educates on medication administration, co-morbidities and monitors patient safety.    2.  DVT prophylaxis:  Patient is on nothing for anticoagulation upon transfer due to her hemorrhagic stroke. Encourage OOB. Monitor daily for signs and symptoms of DVT including but not limited to swelling and pain to prevent the development of DVT that may interfere with therapies.    3.  Pain: No issues with pain currently / Controlled with as needed oral analgesics.    4.  Nutrition/Dysphagia: Dietician monitors nutrient intake, recommend supplements prn and provide nutrition education to pt/family to promote optimal nutrition for wound healing/recovery.     5.  Bladder/bowel:  Start bowel and bladder program, to prevent constipation, urinary retention (which may lead to UTI), and urinary incontinence (which will impact upon pt's functional independence).   - TV Q3h while awake with post void bladder scans, I&O cath for PVRs >400  - up to commode after meal     6.  Skin/dermal ulcer prophylaxis: Monitor for new skin conditions with q.2 h. turns as required to prevent the development of skin breakdown.     7.  GI prophylaxis: Omeprazole to prevent gastritis or GI bleed that would interfere with therapies.    8. Respiratory therapy: RT performs O2 management prn, breathing retraining, pulmonary hygiene and bronchospasm management prn to optimize participation in therapies.    Pt was seen today for 72 min, and entire time spent in face-to-face contact was >50% in counseling and coordination of care as detailed in A/P above.        GOALS/EXPECTED LEVEL OF FUNCTION BASED ON CURRENT MEDICAL AND FUNCTIONAL STATUS (may change based on patient's medical status and rate of impairment recovery):  Transfers:   Supervision  Mobility/Gait:   Supervision  ADL's:   Minimal Assistance  Cognition:  Least Verbal Cues  Swallowing:  Least Restrictive Diet      DISPOSITION: Discharge  to pre-morbid independent living setting with the supportive care of patient's family.      ELOS: 21 days    Vee Valverde M.D.  Physical Medicine and Rehabilitation

## 2021-12-17 NOTE — PROGRESS NOTES
Patient admitted to facility at 1255 via WC; accompanied by hospital transport.  Patient assisted to room and positioned in bed for comfort and safety; call light within reach.  Patient assisted with stowing belongings and oriented to room and facility.  Admission assessment performed and documented in computer.  Admission paperwork completed; signed copies placed in chart.  Will continue to monitor.      2 RN Skin Check    2 RN skin check complete.   Devices in place:NA  Skin assessed under devices: N\A.  Confirmed pressure ulcers found on: NA.  New potential pressure ulcers noted on NA. Wound consult placed N/A.  The following interventions in place Pillows and Lotion.

## 2021-12-17 NOTE — CARE PLAN
Problem: Knowledge Deficit - Standard  Goal: Patient and family/care givers will demonstrate understanding of plan of care, disease process/condition, diagnostic tests and medications  Outcome: Progressing  Oriented pt to room and call light what to expect today and what will start tomorrow (therapies)        Problem: Fall Risk - Rehab  Goal: Patient will remain free from falls  Outcome: Progressing     Problem: Pain - Standard  Goal: Alleviation of pain or a reduction in pain to the patient’s comfort goal  Outcome: Progressing

## 2021-12-17 NOTE — FLOWSHEET NOTE
12/17/21 1356   Events/Summary/Plan   Events/Summary/Plan RT assessment   Vital Signs   Pulse 78   Respiration 18   Pulse Oximetry 95 %   $ Pulse Oximetry (Spot Check) Yes   Respiratory Assessment   Level of Consciousness Alert   Chest Exam   Work Of Breathing / Effort Within Normal Limits   Oxygen   O2 Delivery Device None - Room Air   Smoking History   Have you ever smoked Never

## 2021-12-17 NOTE — FLOWSHEET NOTE
12/17/21 6173   Patient History   Pulmonary Diagnosis none   Procedures Relevant to Respiratory Status none   Home O2 No   Nocturnal CPAP No   Home Treatments/Frequency No   Sleep Apnea Screening   Have you had a sleep study? No   Have you been diagnosed with sleep apnea? No

## 2021-12-17 NOTE — DISCHARGE PLANNING
Anticipated Discharge Disposition:   [facility]    Action:    Per [facility] pt accepted by Dr. Valverde to [facility] and bed available today.  Transport scheduled for 1230 via [facility].  Pt agreeable.  Verbal for [facility] obtained.    [facility] signed by Dr. Sierra.    [facility] form placed in S196-2 slot.    Barriers to Discharge:    None    Plan:    DC

## 2021-12-17 NOTE — DISCHARGE PLANNING
Dr. Sierra has medically cleared.  Spoke with Art and he has all ready notified his employer.  He assured me that 24/7 will be available.  Will discuss this case with the Admissions Team this morning for an anticipated admission.     0957-Dr. Valverde has accepted.  Transport has been arranged via [employer] @ 7940.  Dr. Sierra, Art spouse, Monico BSN & Charleen MYRICK are aware.

## 2021-12-17 NOTE — HOSPITAL COURSE
This is a 56 year old female with PMHx of hypertension who was transferred from [facility] due to hypertensive emergency noted to have a left basal ganglia ICH. Patient was placed on nicardipine drip in the ICU.    SLP evaluated s/p FEES 12/16, started on modified diet. PT/OT recommend IRF.

## 2021-12-17 NOTE — DISCHARGE SUMMARY
Discharge Summary    CHIEF COMPLAINT ON ADMISSION  Slurred speech and right sided weakness    Reason for Admission  Acute hemorrhagic stroke - neuro     CODE STATUS  Full Code    HPI & HOSPITAL COURSE  Maryjane Gray is a 56 y.o. female who presented 12/14/2021 with acute right sided weakness and found to have a left sided intracranial hemorrhage likely hypertensive in origin.  She was admitted to the ICU for BP control and monitored neurologically.  Neurology did consult. She has ongoing deficit with right sided weakness, fine motor skills and dysarthria. She had Speech therapy perform a FEEs and was initated on a liquid diet with mildly thick liquids and to have direct supervision when eating as well as sitting upright while eating, medication may be crushed in puree.  Rehab consulted and have accepted for transfer.    Therefore, she is discharged in fair and stable condition to an inpatient rehabilitation hospital.    The patient met 2-midnight criteria for an inpatient stay at the time of discharge.      FOLLOW UP ITEMS POST DISCHARGE  None    DISCHARGE DIAGNOSES  Principal Problem:    Intracranial hemorrhage (HCC) POA: Yes  Active Problems:    Primary hypertension POA: Unknown    Elevated liver enzymes POA: Unknown  Resolved Problems:    Hypophosphatemia POA: Unknown      FOLLOW UP  She will need post stroke follow up with [facility] and her primary care.  No follow-up provider specified.    MEDICATIONS ON DISCHARGE     Medication List      START taking these medications      Instructions   acetaminophen 325 MG Tabs  Commonly known as: Tylenol   2 Tablets by Enteral Tube route every four hours as needed (Temperature greater than 100.4 F (38 C)).  Dose: 650 mg     amLODIPine 10 MG Tabs  Start taking on: December 18, 2021  Commonly known as: NORVASC   1 Tablet by Enteral Tube route every day.  Dose: 10 mg     * hydrALAZINE 20 MG/ML Soln  Commonly known as: APRESOLINE   Infuse 0.5-1 mL into a venous  catheter every four hours as needed.  Dose: 10-20 mg     * hydrALAZINE 25 MG Tabs  Commonly known as: APRESOLINE   1 Tablet by Enteral Tube route every 6 hours.  Dose: 25 mg     labetalol 5 MG/ML Soln  Commonly known as: NORMODYNE/TRANDATE   Infuse 2-4 mL into a venous catheter every four hours as needed (SBP>160).  Dose: 10-20 mg     lisinopril 10 MG Tabs  Commonly known as: PRINIVIL   1 Tablet by Enteral Tube route 2 times a day.  Dose: 10 mg         * This list has 2 medication(s) that are the same as other medications prescribed for you. Read the directions carefully, and ask your doctor or other care provider to review them with you.            STOP taking these medications    asa/apap/caffeine 250-250-65 MG Tabs  Commonly known as: EXCEDRIN            Allergies  No Known Allergies    DIET  Orders Placed This Encounter   Procedures   • Diet: Diet Tube Feed; Formula: Fibersource HN; Goal Rate (mL/Hour): 55; Duration: 24 HR     Start at 25 ml/hour and advance per protocol to goal rate.     Standing Status:   Standing     Number of Occurrences:   1     Order Specific Question:   Diet     Answer:   Diet Tube Feed [35]     Order Specific Question:   Formula:     Answer:   Fibersource HN     Order Specific Question:   Goal Rate (mL/Hour)     Answer:   55     Order Specific Question:   Route     Answer:   Cortrak     Order Specific Question:   Duration     Answer:   24 HR   • Diet Order Diet: Level 3 - Liquidised; Liquid level: Level 2 - Mildly Thick     Standing Status:   Standing     Number of Occurrences:   1     Order Specific Question:   Diet:     Answer:   Level 3 - Liquidised [26]     Order Specific Question:   Liquid level     Answer:   Level 2 - Mildly Thick       ACTIVITY  As tolerated.  Weight bearing as tolerated    LINES, DRAINS, AND WOUNDS  This is an automated list. Peripheral IVs will be removed prior to discharge.  Peripheral IV 12/14/21 18 G Right Antecubital (Active)   Site Assessment  Clean;Dry;Intact 12/16/21 2000   Dressing Type Transparent 12/16/21 2000   Line Status Scrubbed the hub prior to access;Flushed;Saline locked;Leaking 12/16/21 2000   Dressing Status Clean;Dry;Intact 12/16/21 2000   Dressing Intervention N/A 12/16/21 2000   Date Primary Tubing Changed 12/14/21 12/14/21 0800   NEXT Primary Tubing Change  12/18/21 12/14/21 0800   Infiltration Grading ([facility], [facility]) 0 12/16/21 2000   Phlebitis Scale ([facility] Only) 0 12/16/21 2000       Peripheral IV 12/16/21 20 G Anterior;Left Upper arm (Active)   Site Assessment Clean;Dry;Intact 12/16/21 2000   Dressing Type Transparent 12/16/21 2000   Line Status Scrubbed the hub prior to access;Flushed;Saline locked 12/16/21 2000   Dressing Status Clean;Dry;Intact 12/16/21 2000   Dressing Intervention N/A 12/16/21 2000   Infiltration Grading ([facility]) 0 12/16/21 2000   Phlebitis Scale ([facility] Only) 0 12/16/21 2000          Peripheral IV 12/14/21 18 G Right Antecubital (Active)   Site Assessment Clean;Dry;Intact 12/16/21 2000   Dressing Type Transparent 12/16/21 2000   Line Status Scrubbed the hub prior to access;Flushed;Saline locked;Leaking 12/16/21 2000   Dressing Status Clean;Dry;Intact 12/16/21 2000   Dressing Intervention N/A 12/16/21 2000   Date Primary Tubing Changed 12/14/21 12/14/21 0800   NEXT Primary Tubing Change  12/18/21 12/14/21 0800   Infiltration Grading ([facility]) 0 12/16/21 2000   Phlebitis Scale ([facility] Only) 0 12/16/21 2000       Peripheral IV 12/16/21 20 G Anterior;Left Upper arm (Active)   Site Assessment Clean;Dry;Intact 12/16/21 2000   Dressing Type Transparent 12/16/21 2000   Line Status Scrubbed the hub prior to access;Flushed;Saline locked 12/16/21 2000   Dressing Status Clean;Dry;Intact 12/16/21 2000   Dressing Intervention N/A 12/16/21 2000   Infiltration Grading ([facility]) 0 12/16/21 2000   Phlebitis Scale ([facility] Only) 0 12/16/21 2000               MENTAL STATUS ON TRANSFER   Alert and oriented           CONSULTATIONS  Neurology  Physiatry    PROCEDURES  None    LABORATORY  Lab Results   Component Value Date    SODIUM 151 (H) 12/17/2021    POTASSIUM 3.3 (L) 12/17/2021    CHLORIDE 114 (H) 12/17/2021    CO2 24 12/17/2021    GLUCOSE 112 (H) 12/17/2021    BUN 19 12/17/2021    CREATININE 0.59 12/17/2021        Lab Results   Component Value Date    WBC 11.2 (H) 12/17/2021    HEMOGLOBIN 13.6 12/17/2021    HEMATOCRIT 41.7 12/17/2021    PLATELETCT 186 12/17/2021        Total time of the discharge process exceeds 31 minutes.

## 2021-12-17 NOTE — CARE PLAN
The patient is Stable - Low risk of patient condition declining or worsening    Shift Goals  Clinical Goals: monitor neuro status, discharge  Patient Goals: discharge  Family Goals: rest, diet, discharge    Progress made toward(s) clinical / shift goals:      Problem: Knowledge Deficit - Standard  Goal: Patient and family/care givers will demonstrate understanding of plan of care, disease process/condition, diagnostic tests and medications  Outcome: Progressing     Problem: Fall Risk  Goal: Patient will remain free from falls  Outcome: Progressing     Problem: Psychosocial - Patient Condition  Goal: Patient's ability to verbalize feelings about condition will improve  Outcome: Progressing     Problem: Neuro Status  Goal: Neuro status will remain stable or improve  Outcome: Progressing       Patient is not progressing towards the following goals:

## 2021-12-18 PROBLEM — R74.01 TRANSAMINITIS: Status: ACTIVE | Noted: 2021-12-18

## 2021-12-18 PROBLEM — R79.89 AZOTEMIA: Status: ACTIVE | Noted: 2021-12-18

## 2021-12-18 PROBLEM — R50.9 LOW GRADE FEVER: Status: ACTIVE | Noted: 2021-12-18

## 2021-12-18 LAB
ALBUMIN SERPL BCP-MCNC: 3.8 G/DL (ref 3.2–4.9)
ALBUMIN/GLOB SERPL: 1.3 G/DL
ALP SERPL-CCNC: 90 U/L (ref 30–99)
ALT SERPL-CCNC: 69 U/L (ref 2–50)
ANION GAP SERPL CALC-SCNC: 11 MMOL/L (ref 7–16)
APPEARANCE UR: CLEAR
AST SERPL-CCNC: 46 U/L (ref 12–45)
BACTERIA #/AREA URNS HPF: NEGATIVE /HPF
BASOPHILS # BLD AUTO: 0.7 % (ref 0–1.8)
BASOPHILS # BLD: 0.06 K/UL (ref 0–0.12)
BILIRUB CONJ SERPL-MCNC: <0.2 MG/DL (ref 0.1–0.5)
BILIRUB INDIRECT SERPL-MCNC: NORMAL MG/DL (ref 0–1)
BILIRUB SERPL-MCNC: 0.7 MG/DL (ref 0.1–1.5)
BILIRUB UR QL STRIP.AUTO: NEGATIVE
BUN SERPL-MCNC: 26 MG/DL (ref 8–22)
CALCIUM SERPL-MCNC: 8.9 MG/DL (ref 8.5–10.5)
CHLORIDE SERPL-SCNC: 108 MMOL/L (ref 96–112)
CO2 SERPL-SCNC: 24 MMOL/L (ref 20–33)
COLOR UR: YELLOW
CREAT SERPL-MCNC: 0.51 MG/DL (ref 0.5–1.4)
EOSINOPHIL # BLD AUTO: 0.3 K/UL (ref 0–0.51)
EOSINOPHIL NFR BLD: 3.4 % (ref 0–6.9)
EPI CELLS #/AREA URNS HPF: NORMAL /HPF
ERYTHROCYTE [DISTWIDTH] IN BLOOD BY AUTOMATED COUNT: 48.8 FL (ref 35.9–50)
GLOBULIN SER CALC-MCNC: 3 G/DL (ref 1.9–3.5)
GLUCOSE BLD-MCNC: 116 MG/DL (ref 65–99)
GLUCOSE BLD-MCNC: 116 MG/DL (ref 65–99)
GLUCOSE BLD-MCNC: 90 MG/DL (ref 65–99)
GLUCOSE BLD-MCNC: 99 MG/DL (ref 65–99)
GLUCOSE SERPL-MCNC: 99 MG/DL (ref 65–99)
GLUCOSE UR STRIP.AUTO-MCNC: NEGATIVE MG/DL
HCT VFR BLD AUTO: 42 % (ref 37–47)
HGB BLD-MCNC: 13.8 G/DL (ref 12–16)
HYALINE CASTS #/AREA URNS LPF: NORMAL /LPF
IMM GRANULOCYTES # BLD AUTO: 0.05 K/UL (ref 0–0.11)
IMM GRANULOCYTES NFR BLD AUTO: 0.6 % (ref 0–0.9)
KETONES UR STRIP.AUTO-MCNC: ABNORMAL MG/DL
LEUKOCYTE ESTERASE UR QL STRIP.AUTO: ABNORMAL
LYMPHOCYTES # BLD AUTO: 2.68 K/UL (ref 1–4.8)
LYMPHOCYTES NFR BLD: 30 % (ref 22–41)
MAGNESIUM SERPL-MCNC: 2.2 MG/DL (ref 1.5–2.5)
MCH RBC QN AUTO: 30.1 PG (ref 27–33)
MCHC RBC AUTO-ENTMCNC: 32.9 G/DL (ref 33.6–35)
MCV RBC AUTO: 91.7 FL (ref 81.4–97.8)
MICRO URNS: ABNORMAL
MONOCYTES # BLD AUTO: 0.92 K/UL (ref 0–0.85)
MONOCYTES NFR BLD AUTO: 10.3 % (ref 0–13.4)
NEUTROPHILS # BLD AUTO: 4.91 K/UL (ref 2–7.15)
NEUTROPHILS NFR BLD: 55 % (ref 44–72)
NITRITE UR QL STRIP.AUTO: NEGATIVE
NRBC # BLD AUTO: 0 K/UL
NRBC BLD-RTO: 0 /100 WBC
PH UR STRIP.AUTO: 6.5 [PH] (ref 5–8)
PLATELET # BLD AUTO: 188 K/UL (ref 164–446)
PMV BLD AUTO: 10.9 FL (ref 9–12.9)
POTASSIUM SERPL-SCNC: 3.4 MMOL/L (ref 3.6–5.5)
PROT SERPL-MCNC: 6.8 G/DL (ref 6–8.2)
PROT UR QL STRIP: NEGATIVE MG/DL
RBC # BLD AUTO: 4.58 M/UL (ref 4.2–5.4)
RBC # URNS HPF: NORMAL /HPF
RBC UR QL AUTO: NEGATIVE
SARS-COV-2 RNA RESP QL NAA+PROBE: NOTDETECTED
SODIUM SERPL-SCNC: 143 MMOL/L (ref 135–145)
SP GR UR STRIP.AUTO: 1.02
SPECIMEN SOURCE: NORMAL
UROBILINOGEN UR STRIP.AUTO-MCNC: 1 MG/DL
WBC # BLD AUTO: 8.9 K/UL (ref 4.8–10.8)
WBC #/AREA URNS HPF: NORMAL /HPF

## 2021-12-18 PROCEDURE — A9270 NON-COVERED ITEM OR SERVICE: HCPCS | Performed by: PHYSICAL MEDICINE & REHABILITATION

## 2021-12-18 PROCEDURE — 82306 VITAMIN D 25 HYDROXY: CPT

## 2021-12-18 PROCEDURE — 97530 THERAPEUTIC ACTIVITIES: CPT

## 2021-12-18 PROCEDURE — 85025 COMPLETE CBC W/AUTO DIFF WBC: CPT

## 2021-12-18 PROCEDURE — 770010 HCHG ROOM/CARE - REHAB SEMI PRIVAT*

## 2021-12-18 PROCEDURE — 97535 SELF CARE MNGMENT TRAINING: CPT

## 2021-12-18 PROCEDURE — 82248 BILIRUBIN DIRECT: CPT

## 2021-12-18 PROCEDURE — 80053 COMPREHEN METABOLIC PANEL: CPT

## 2021-12-18 PROCEDURE — 92523 SPEECH SOUND LANG COMPREHEN: CPT

## 2021-12-18 PROCEDURE — 700102 HCHG RX REV CODE 250 W/ 637 OVERRIDE(OP): Performed by: PHYSICAL MEDICINE & REHABILITATION

## 2021-12-18 PROCEDURE — 700102 HCHG RX REV CODE 250 W/ 637 OVERRIDE(OP): Performed by: HOSPITALIST

## 2021-12-18 PROCEDURE — 92610 EVALUATE SWALLOWING FUNCTION: CPT

## 2021-12-18 PROCEDURE — 99232 SBSQ HOSP IP/OBS MODERATE 35: CPT | Performed by: PHYSICAL MEDICINE & REHABILITATION

## 2021-12-18 PROCEDURE — 83735 ASSAY OF MAGNESIUM: CPT

## 2021-12-18 PROCEDURE — A9270 NON-COVERED ITEM OR SERVICE: HCPCS | Performed by: HOSPITALIST

## 2021-12-18 PROCEDURE — 97162 PT EVAL MOD COMPLEX 30 MIN: CPT

## 2021-12-18 PROCEDURE — 99223 1ST HOSP IP/OBS HIGH 75: CPT | Performed by: HOSPITALIST

## 2021-12-18 PROCEDURE — 36415 COLL VENOUS BLD VENIPUNCTURE: CPT

## 2021-12-18 PROCEDURE — 97166 OT EVAL MOD COMPLEX 45 MIN: CPT

## 2021-12-18 PROCEDURE — 82962 GLUCOSE BLOOD TEST: CPT | Mod: 91

## 2021-12-18 RX ORDER — POTASSIUM CHLORIDE 20 MEQ/1
40 TABLET, EXTENDED RELEASE ORAL ONCE
Status: COMPLETED | OUTPATIENT
Start: 2021-12-18 | End: 2021-12-18

## 2021-12-18 RX ORDER — POTASSIUM CHLORIDE 20 MEQ/1
20 TABLET, EXTENDED RELEASE ORAL DAILY
Status: DISCONTINUED | OUTPATIENT
Start: 2021-12-19 | End: 2021-12-21

## 2021-12-18 RX ADMIN — LISINOPRIL 10 MG: 5 TABLET ORAL at 17:37

## 2021-12-18 RX ADMIN — LISINOPRIL 10 MG: 5 TABLET ORAL at 05:49

## 2021-12-18 RX ADMIN — OMEPRAZOLE 40 MG: KIT at 09:00

## 2021-12-18 RX ADMIN — POTASSIUM CHLORIDE 40 MEQ: 1500 TABLET, EXTENDED RELEASE ORAL at 15:28

## 2021-12-18 RX ADMIN — HYDRALAZINE HYDROCHLORIDE 25 MG: 25 TABLET, FILM COATED ORAL at 11:51

## 2021-12-18 RX ADMIN — HYDRALAZINE HYDROCHLORIDE 25 MG: 25 TABLET, FILM COATED ORAL at 05:49

## 2021-12-18 RX ADMIN — SENNOSIDES AND DOCUSATE SODIUM 2 TABLET: 50; 8.6 TABLET ORAL at 09:00

## 2021-12-18 RX ADMIN — HYDRALAZINE HYDROCHLORIDE 25 MG: 25 TABLET, FILM COATED ORAL at 17:42

## 2021-12-18 RX ADMIN — AMLODIPINE BESYLATE 10 MG: 5 TABLET ORAL at 05:48

## 2021-12-18 RX ADMIN — HYDRALAZINE HYDROCHLORIDE 25 MG: 25 TABLET, FILM COATED ORAL at 00:12

## 2021-12-18 RX ADMIN — HYDRALAZINE HYDROCHLORIDE 25 MG: 25 TABLET, FILM COATED ORAL at 23:38

## 2021-12-18 ASSESSMENT — PATIENT HEALTH QUESTIONNAIRE - PHQ9
1. LITTLE INTEREST OR PLEASURE IN DOING THINGS: NOT AT ALL
2. FEELING DOWN, DEPRESSED, IRRITABLE, OR HOPELESS: NOT AT ALL
1. LITTLE INTEREST OR PLEASURE IN DOING THINGS: NOT AT ALL
SUM OF ALL RESPONSES TO PHQ9 QUESTIONS 1 AND 2: 0
SUM OF ALL RESPONSES TO PHQ9 QUESTIONS 1 AND 2: 0
2. FEELING DOWN, DEPRESSED, IRRITABLE, OR HOPELESS: NOT AT ALL

## 2021-12-18 ASSESSMENT — BRIEF INTERVIEW FOR MENTAL STATUS (BIMS)
WHAT YEAR IS IT: CORRECT
WHAT MONTH IS IT: ACCURATE WITHIN 5 DAYS
ASKED TO RECALL SOCK: YES, NO CUE REQUIRED
ASKED TO RECALL BLUE: YES, NO CUE REQUIRED
BIMS SUMMARY SCORE: 15
ASKED TO RECALL BLUE: YES, AFTER CUEING (A COLOR")"
ASKED TO RECALL SOCK: NO, COULD NOT RECALL
WHAT MONTH IS IT: ACCURATE WITHIN 5 DAYS
ASKED TO RECALL BED: YES, NO CUE REQUIRED
ASKED TO RECALL BED: YES, NO CUE REQUIRED
INITIAL REPETITION OF BED BLUE SOCK - FIRST ATTEMPT: 2
WHAT YEAR IS IT: CORRECT
WHAT DAY OF THE WEEK IS IT: CORRECT
BIMS SUMMARY SCORE: 11
INITIAL REPETITION OF BED BLUE SOCK - FIRST ATTEMPT: 3
WHAT DAY OF THE WEEK IS IT: CORRECT

## 2021-12-18 ASSESSMENT — ACTIVITIES OF DAILY LIVING (ADL)
TUB_SHOWER_TRANSFER_DESCRIPTION: GRAB BAR;SHOWER BENCH;INCREASED TIME
BED_CHAIR_WHEELCHAIR_TRANSFER_DESCRIPTION: INCREASED TIME;SUPERVISION FOR SAFETY;VERBAL CUEING
TOILET_TRANSFER_DESCRIPTION: GRAB BAR;INCREASED TIME;INITIAL PREPARATION FOR TASK
TOILETING: INDEPENDENT
BED_CHAIR_WHEELCHAIR_TRANSFER_DESCRIPTION: ADAPTIVE EQUIPMENT;INCREASED TIME;INITIAL PREPARATION FOR TASK;SUPERVISION FOR SAFETY;VERBAL CUEING

## 2021-12-18 ASSESSMENT — GAIT ASSESSMENTS
GAIT LEVEL OF ASSIST: MINIMAL ASSIST
ASSISTIVE DEVICE: FRONT WHEEL WALKER
DEVIATION: DECREASED HEEL STRIKE;DECREASED TOE OFF
DISTANCE (FEET): 50

## 2021-12-18 ASSESSMENT — PAIN DESCRIPTION - PAIN TYPE: TYPE: ACUTE PAIN

## 2021-12-18 NOTE — THERAPY
"Speech Language Pathology   Initial Assessment     Patient Name: Maryjane Gray  AGE:  56 y.o., SEX:  female  Medical Record #: [id number]  Today's Date: 12/18/2021     Subjective    Pt pleasant and cooperative during tx.  Per chart review, Pt is aphasic.  However, pt demonstrated ability to verbalized wants/needs, and comprehend basic conversation.  Pt presented with intermittent word finding deficits.       Objective       12/18/21 0801   Prior Living Situation   Prior Services Home-Independent   Housing / Facility 2 Story House   Lives with - Patient's Self Care Capacity Spouse;Adult Children   Prior Level Of Function   Communication Within Functional Limits   Swallow Within Functional Limits   Dentition Intact   Dentures None   Hearing Within Functional Limits for Evaluation   Hearing Aid None   Vision Wears Corrective Lenses;Reading    Patient's Primary Language English   Education High School Graduate or GED   Occupation (Pre-Hospital Vocational) Employed Full Time   Comments works as a    Receptive Language / Auditory Comprehension   Receptive Language / Auditory Comprehension WDL   Expressive Language   Word Finding Deficits Minimal (4)   Reading Comprehension    Reading Comprehension (WDL) WDL   Written Language Expression   Dominant Hand Right;Using Non-Dominant Hand   ABS (Agitated Behavior Scale)   Agitated Behavior Scale Performed No   Cognitive Pattern Assessment   Cognitive Pattern Assessment Used BIMS   Brief Interview for Mental Status (BIMS)   Repetition of Three Words (First Attempt) 2   Temporal Orientation: Year Correct   Temporal Orientation: Month Accurate within 5 days   Temporal Orientation: Day Correct   Recall: \"Sock\" No, could not recall   Recall: \"Blue\" Yes, after cueing (\"a color\")   Recall: \"Bed\" Yes, no cue required   BIMS Summary Score 11   Social / Pragmatic Communication   Social / Pragmatic Communication WDL   Tracheostomy   Tracheostomy No   Speech Mechanisms / " Voice Production   Speech Mechanisms / Voice Production (WDL) WDL   Labial Function   Labial Structure At Rest Minimal  (R labial droop)   Lingual Function   Lingual Function (WDL) WDL   Velar Function   Velar Function (WDL) WDL   Laryngeal Function   Voice Quality Within Functional Limits   Volutional Cough Within Functional Limits   Excursion Upon Swallow Weak    Swallowing   Thick Nectar / Honey / Liquid Within Functional Limits   Pureed (4) Minimal   Masticated Foods Minimal   Dysphagia Strategies / Recommendations   Strategies / Interventions Recommended (Yes / No) Yes   Compensatory Strategies Alternate Solids / Liquids;Single Sips / Bites;Multiple Swallows;Monitor During Meals;No Talking During Eating / Drinking   Diet / Liquid Recommendation Puree (4);Mildly Thick (2) - (Nectar Thick)   Medication Administration  Crush all Medications in Puree  (or via NG tube)   Swallowing/Nutritional Status   Swallowing/Nutritional Status Modified food consistency   Functional Level of Assist   Eating Minimal Assist   Eating Description Supervision for safety;Modified diet;Verbal cueing   Comprehension Modified Independent   Comprehension Description Glasses   Expression Minimal Assist   Expression Description Verbal cueing   Social Interaction Independent   Problem Solving Minimal Assist   Problem Solving Description Verbal cueing;Therapy schedule;Increased time   Memory Minimal Assist   Memory Description Verbal cueing;Therapy schedule;Increased time;Bed/chair alarm   Outcome Measures   Outcome Measures Utilized WAB-Bedside   WAB - Bedside (Western Aphasia Battery)   Spontaneous Speech: Content  10   Spontaneous Speech: Fluency 9   Auditory Comprehension 10   Sequential Commands 8   Repetition Score  10   Object Naming 10   Reading 10   Writing 8   Apraxia  10   Bedside Asphasia Score 95   Bedside Language Score 93.75   Problem List   Problem List Cognitive-Linguistic Deficits;Dysphagia  (mild word finding deficits)    Current Discharge Plan   Current Discharge Plan Return to Prior Living Situation   Benefit   Therapy Benefit Patient would benefit from Inpatient Rehab Speech-Language Pathology to address above identified deficits.   Interdisciplinary Plan of Care Collaboration   IDT Collaboration with  Nursing   Collaboration Comments CLOF   Strengths & Barriers   Strengths Able to follow instructions;Willingly participates in therapeutic activities;Supportive family;Pleasant and cooperative;Independent prior level of function   Barriers Impaired functional cognition   Speech Language Pathologist Assigned   Assigned SLP / Extension CL/MP 60 cog/sw tdine (NO SPLIT)    SLP Total Time Spent   SLP Individual Total Time Spent (Mins) 90   Evaluation Charges   SLP Speech Language Evaluation Speech Sound Language Comprehension   SLP Oral Pharyngeal Evaluation Oral Pharyngeal Evaluation       Assessment    Patient is 56 y.o. female with a diagnosis of hemorrhagic stroke with R sided weakness.  Additional factors influencing patient status/progress (ie: cognitive factors, co-morbidities, social support, etc): Bedside swallow evaluation completed.  Oral motor examination significant for mild R labial droop. Pt presented with mild oral, and suspect mild pharyngeal dysphagia.  Pt presented with throat clear x2 following liquidized level 3 diet.  Pt tolerated level 2 (NTL) without overt s/s of aspiration.  Pt tolerated trials of level 4 (puree) without oral residue or s/s of aspiration.  Pt tolerated 1 trial of level 6 (canned pear).  Pt presented with prolonged mastication and declined further trials.  Recommend upgrade to level 4 (puree) and level 2 (NTL), meds crushed with puree or via NG tube.  Recommend Tdine to assess diet tolerance and use of swallow strategies.  Recommend MBS to fully assess swallow function.      Bedside WAB completed.  Pt presented with bedside language score of 93, and bedside aphasia score of 95.  Pt required min  verbal cues to aid in recall on the BIMS.  Recommend completion of SCCAN with additional goals as deemed appropriate.  Pt required min verbal cues to aid in word finding during conversation.    Plan  Recommend Speech Therapy 30-60 minutes per day 5-6 days per week for 4 weeks for the following treatments:  SLP Aphasia Evaluation, SLP Speech Language Treatment, SLP Swallowing Dysfunction Treatment, SLP Oral Pharyngeal Evaluation, SLP Cognitive Skill Development and SLP Group Treatment.    Passport items to be completed:  Passport items to be completed:  Express basic needs, understand food/liquid recommendations, consistently follow swallow precautions, manage finances, manage medications, arrive to therapy appointments on time, complete daily memory log entries, solve problems related to safety situations, review education related to hospitalization, complete caregiver training     Goals:  Long term and short term goals have been discussed with patient and they are in agreement.    Speech Therapy Problems (Active)     Problem: Expression STGs     Dates: Start: 12/18/21       Goal: STG-Within one week, patient will state 15 items per minute given concrete category and min verbal cues.       Dates: Start: 12/18/21             Problem: Problem Solving STGs     Dates: Start: 12/18/21       Goal: STG-Within one week, patient will complete SCCAN, with additional goals as deemed appropriate.       Dates: Start: 12/18/21          Goal: STG-Within one week, patient will complete executive function tasks with 80% accuracy given min verbal cues.       Dates: Start: 12/18/21             Problem: Social Interaction STGs     Dates: Start: 12/18/21       Goal: STG-Within one week, patient will     Dates: Start: 12/18/21          Goal: STG-Within one week, patient will     Dates: Start: 12/18/21             Problem: Speech/Swallowing LTGs     Dates: Start: 12/18/21       Goal: LTG-By discharge, patient will safely swallow regular  textures and thin liquids without aspiration.        Dates: Start: 12/18/21          Goal: LTG-By discharge, patient will solve complex problem Luisito for safe discharge home.     Dates: Start: 12/18/21             Problem: Swallowing STGs     Dates: Start: 12/18/21       Goal: STG-Within one week, patient will complete MBS.     Dates: Start: 12/18/21          Goal: STG-Within one week, patient will tolerate level 4 textures (puree) and level 2 (mildly thick liquids) without oral residue or s/s of aspiration, over 2 meals.       Dates: Start: 12/18/21

## 2021-12-18 NOTE — THERAPY
"Occupational Therapy   Initial Evaluation     Patient Name: Maryjane Gray  Age:  56 y.o., Sex:  female  Medical Record #: [id number]  Today's Date: 12/18/2021     Subjective    \"Why does my right side not work if everything happened to the left side of my head?\"      Objective       12/18/21 0931   Prior Living Situation   Prior Services Home-Independent   Housing / Facility 2 Story House   Steps Into Home 2  (1 step through the garage )   Steps In Home   (2 on primary level; 12 to second level )   Bathroom Set up Walk In Shower;Bathtub / Shower Combination   Equipment Owned None   Lives with - Patient's Self Care Capacity Spouse;Adult Children   Prior Level of ADL Function   Self Feeding Independent   Grooming / Hygiene Independent   Bathing Independent   Dressing Independent   Toileting Independent   Prior Level of IADL Function   Medication Management Independent   Laundry Independent   Kitchen Mobility Independent   Finances Independent   Home Management Independent   Shopping Independent   Prior Level Of Mobility Independent Without Device in Community   Driving / Transportation Driving Independent   Occupation (Pre-Hospital Vocational) Employed Full Time   Leisure Interests Shopping   Comments Works as a pre-    Prior Functioning: Everyday Activities   Self Care Independent   Indoor Mobility (Ambulation) Independent   Stairs Independent   Functional Cognition Independent   Prior Device Use None of the given options   Pain   Intervention Declines   Cognition    Cognition / Consciousness WDL   Level of Consciousness Alert   ABS (Agitated Behavior Scale)   Agitated Behavior Scale Performed No   Cognitive Pattern Assessment   Cognitive Pattern Assessment Used BIMS   Brief Interview for Mental Status (BIMS)   Repetition of Three Words (First Attempt) 3   Temporal Orientation: Year Correct   Temporal Orientation: Month Accurate within 5 days   Temporal Orientation: Day Correct   Recall: \"Sock\" Yes, no " "cue required   Recall: \"Blue\" Yes, no cue required   Recall: \"Bed\" Yes, no cue required   BIMS Summary Score 15   Vision Screen   Vision Not tested   Passive ROM Upper Body   Passive ROM Upper Body WDL   Active ROM Upper Body   Active ROM Upper Body  X   Dominant Hand Right   Shoulder Elevation Symmetric   Shoulder Depression Symmetric   Shoulder Retraction Symmetric   Rt Shoulder Flexion Degrees 35  (Visual estimate)   Rt Shoulder Abduction Degrees 25  (Visual estimate)   Rt Elbow Flexion Degrees 55  (visual estimate )   Rt Wrist Flexion Degrees 35  (visual estimate)   Rt Wrist Extension Degrees 15  (visual estimate)   Rt Hand Moderate Impaired   Comments Emerging gross grasp;    Strength Upper Body   Upper Body Strength  X   Rt Shoulder Flexion Strength 2 (P)   Rt Shoulder Abduction Strength 2 (P)   Rt Elbow Flexion Strength 2+ (P+)   Rt Elbow Extension Strength 3- (F-)   Right  Impaired   Sensation Upper Body   Comments Not formally tested; patient noting hot/cold sensation and deep pressure sensation intact; proprioception intact    Upper Body Muscle Tone   Upper Body Muscle Tone  WDL   Balance Assessment   Sitting Balance (Static) Good   Sitting Balance (Dynamic) Fair   Standing Balance (Static) Fair -   Standing Balance (Dynamic) Fair -   Weight Shift Sitting Good   Weight Shift Standing Fair   Bed Mobility    Supine to Sit Stand by Assist   Sit to Supine Stand by Assist   Scooting Stand by Assist   Coordination Upper Body   Coordination X   Fine Motor Coordination R FMC deficit with no s/s of pad to pad opposition, pincer grasp, tripod grasp, of finger isolation    Gross Motor Coordination Mild RUE neglect noted during bathing pursuits and transfers; GMC impacted by AROM deficits.    Eating   Assistance Needed Physical assistance   Physical Assistance Level Total assistance   CARE Score - Eating 1   Eating Discharge Goal   Discharge Goal 6   Oral Hygiene   Assistance Needed Physical assistance  (Swab only " )   Physical Assistance Level 26%-50%   CARE Score - Oral Hygiene 3   Oral Hygiene Discharge Goal   Discharge Goal 6   Shower/Bathe Self   Assistance Needed Physical assistance   Physical Assistance Level 51%-75%   CARE Score - Shower/Bathe Self 2   Shower/Bathe Self Discharge Goal   Discharge Goal 4   Upper Body Dressing   Assistance Needed Physical assistance   Physical Assistance Level 26%-50%   CARE Score - Upper Body Dressing 3   Upper Body Dressing Discharge Goal   Discharge Goal 6   Lower Body Dressing   Assistance Needed Physical assistance   Physical Assistance Level 51%-75%   CARE Score - Lower Body Dressing 2   Lower Body Dressing Discharge Goal   Discharge Goal 6   Putting On/Taking Off Footwear   Assistance Needed Physical assistance   Physical Assistance Level Total assistance   CARE Score - Putting On/Taking Off Footwear 1   Putting On/Taking Off Footwear Discharge Goal   Discharge Goal 6   Toileting Hygiene   Assistance Needed Physical assistance   Physical Assistance Level 25% or less   CARE Score - Toileting Hygiene 3   Toileting Hygiene Discharge Goal   Discharge Goal 6   Toilet Transfer   Assistance Needed Physical assistance   Physical Assistance Level 25% or less   CARE Score - Toilet Transfer 3   Toilet Transfer Discharge Goal   Discharge Goal 6   Hearing, Speech, and Vision   Expression of Ideas and Wants Without difficulty   Understanding Verbal and Non-Verbal Content Understands   Functional Level of Assist   Eating Total Assist   Eating Description Staff administers tube feed/parenteral nutrition/IVF;Tube feed bolus   Grooming Moderate Assist   Grooming Description Increased time;Initial preparation for task;Seated in wheelchair at sink;Supervision for safety;Verbal cueing  (oral swab)   Bathing Moderate Assist   Bathing Description Tub bench;Assit wtih lower extremities;Assit with perineal;Increased time;Initial preparation for task;Supervision for safety;Verbal cueing   Upper Body  Dressing Minimal Assist   Upper Body Dressing Description Assit with threading arms through sleeves;Increased time;Supervision for safety;Verbal cueing   Lower Body Dressing Moderate Assist   Lower Body Dressing Description Assist with threading into pant leg;Increased time;Initial preparation for task;Supervision for safety;Verbal cueing   Toileting Minimal Assist   Toileting Description Assist to pull pants down;Assist for standing balance;Grab bar;Increased time   Bed, Chair, Wheelchair Transfer Contact Guard Assist   Bed Chair Wheelchair Transfer Description Increased time;Supervision for safety;Verbal cueing   Toilet Transfers Minimal Assist   Toilet Transfer Description Grab bar;Increased time;Initial preparation for task   Tub / Shower Transfers Minimal Assist   Tub Shower Transfer Description Grab bar;Shower bench;Increased time   Problem List   Problem List Decreased Active Daily Living Skills;Decreased Homemaking Skills;Decreased Upper Extremity Strength Right;Decreased Upper Extremity AROM Right;Decreased Functional Mobility;Decreased Activity Tolerance;Impaired Coordination Right Upper Extremity;Impaired Postural Control / Balance   Precautions   Precautions Fall Risk;Other (See Comments)   Comments R hemiparesis, NG Tube    Current Discharge Plan   Current Discharge Plan Return to Prior Living Situation   Benefit    Therapy Benefit Patient Would Benefit from Inpatient Rehab Occupational Therapy to Maximize [place] with ADLs, IADLs and Functional Mobility.   Interdisciplinary Plan of Care Collaboration   IDT Collaboration with  Nursing;Physical Therapist   Patient Position at End of Therapy In Bed;Call Light within Reach;Tray Table within Reach;Phone within Reach   Equipment Needs   Assistive Device / DME Front-Wheel Walker;Shower Chair   Adaptive Equipment Reacher;Long Handled Shoe Horn;Elastic Shoe Laces   Strengths & Barriers   Strengths Able to follow instructions;Effective communication  skills;Independent prior level of function;Manages pain appropriately;Motivated for self care and independence;Pleasant and cooperative;Supportive family;Willingly participates in therapeutic activities   Barriers Hemiparesis;Impaired activity tolerance;Impaired balance   OT Total Time Spent   OT Individual Total Time Spent (Mins) 60   OT Charge Group   Charges Yes   OT Self Care / ADL 1   OT Evaluation OT Evaluation Mod       Assessment  Patient is 56 y.o. female with a diagnosis of ***.  Additional factors influencing patient status / progress (ie: cognitive factors, co-morbidities, social support, etc): ***.      Plan  Recommend Occupational Therapy {REHAB MINUTES PER DAY:43133} minutes per day {REHAB DAYS/WEEK:81821} days per week for *** {REHAB DAYS/WEEKS:40978} for the following treatments:  {REHAB OT TREATMENTS:04522}.    Passport items to be completed:  {REHAB PASSPORT:56609}    Goals:  Long term and short term goals have been discussed with {REHAB STG DISCUSSED:79460} and they are in agreement.    Occupational Therapy Goals (Active)     Problem: Dressing     Dates: Start: 12/18/21       Goal: STG-Within one week, patient will dress UB     Dates: Start: 12/18/21       Goal Note filed on 12/18/21 1422 by Vanna Rosales, OT     1) Individualized Goal:  at  SUP level with fading verbal cues PRN.               Goal: STG-Within one week, patient will dress LB     Dates: Start: 12/18/21       Goal Note filed on 12/18/21 1422 by Vanna Rosales, OT     1) Individualized Goal:  at a Min A level with AE PRN.                Problem: Grooming     Dates: Start: 12/18/21       Goal: STG-Within one week, patient will complete grooming     Dates: Start: 12/18/21       Goal Note filed on 12/18/21 1422 by Vanna Rosales, OT     1) Individualized Goal:  at a Min A level with AE PRN to complete hair/oral care.                Problem: OT Long Term Goals     Dates: Start: 12/18/21       Goal: LTG-By discharge, patient  will complete basic self care tasks     Dates: Start: 12/18/21       Goal Note filed on 12/18/21 1422 by Vanna Rosales, OT     1) Individualized Goal:  at a SUP to Mod I level with least restrictive AD and AE PRN.             Goal: LTG-By discharge, patient will perform bathroom transfers     Dates: Start: 12/18/21       Goal Note filed on 12/18/21 1422 by Vanna Rosales, OT     1) Individualized Goal:  at a SUP to Mod I level with least restrictive AD and AE PRN.            Goal: LTG-By discharge, patient will complete basic home management     Dates: Start: 12/18/21       Goal Note filed on 12/18/21 1422 by Vanna Rosales, OT     1) Individualized Goal:  at a Min A to Mod I level with least restrictive AD and AE PRN.               Problem: Toileting     Dates: Start: 12/18/21       Goal: STG-Within one week, patient will complete toileting tasks     Dates: Start: 12/18/21       Goal Note filed on 12/18/21 1422 by Vanna Rosales, OT     1) Individualized Goal:   at a Min A level with AE/AD PRN.

## 2021-12-18 NOTE — ASSESSMENT & PLAN NOTE
Currently resolved  Na: 149 --> 150 --> 151 --> 143 (12/18) --> 141 (12/20)  S/P IVF's 1/2 NS x 1 liter  Likely 2nd to TF's and some dehydration  Cont to monitor

## 2021-12-18 NOTE — ASSESSMENT & PLAN NOTE
Currently resolved  Bun 20  Free water thru NGT increased recently  Note: on a thickened liquid diet  Gabi

## 2021-12-18 NOTE — CARE PLAN
"Pt with cortrak to left nares. Aspiration precautions observed , head of bed elevated , placement verified through auscultation and aspirating gastric contents. Meds crushed given via tube . Received with  an on going Fibersource feeding at 25 ml/hr, rate increased at 45 ml/hr at midnight  , rate rate  increased at the goal level  55 ml/hr 0400 hrs. Feeding tolerated , no nausea and vomiting noted.F/s monitored every 6 hrs , F/s at 2100 hrs- 90 and at 0600- 116. No s/s of hypo and  Hyperglycemia noted. Cont monitored.    Problem: Knowledge Deficit - Standard  Goal: Patient and family/care givers will demonstrate understanding of plan of care, disease process/condition, diagnostic tests and medications  Outcome: Progressing  Note: Plan of care, meds,  safety, lab works,  Isolation protocol , pain  mgt explained to pt. Verbalized undrstanding     Problem: Fall Risk - Rehab  Goal: Patient will remain free from falls  Outcome: Progressing  Note: Liz Almodovar Fall risk Assessment Score: 11    Moderate fall risk Interventions  - Bed and strip alarm   - Yellow sign by the door   - Yellow wrist band \"Fall risk\"  - Room near to the nurse station  - Do not leave patient unattended in the bathroom  - Fall risk education provided     Problem: Pain - Standard  Goal: Alleviation of pain or a reduction in pain to the patient’s comfort goal  Outcome: Progressing  Note: Assessed for pain and discomfort, denies pain, cont monitored.   The patient is Stable - Low risk of patient condition declining or worsening    Progress made toward(s) clinical / shift goals:  Pt free from fall and injury.     Patient is not progressing towards the following goals: Right arm flaccid.      "

## 2021-12-18 NOTE — PROGRESS NOTES
"Rehab Progress Note     Interval Events (Subjective)  The patient was seen in her room.  She reports that she does not have any complaints overnight and that she slept well.  No pain complaints, no systemic complaints.      Patient was admitted to acute rehab program on 12/17/2021    Objective:  VITAL SIGNS: /68   Pulse 72   Temp 37.3 °C (99.1 °F) (Temporal)   Resp 16   Ht 1.626 m (5' 4\")   Wt 65 kg (143 lb 4.8 oz)   SpO2 96%   BMI 24.60 kg/m²   Gen; patient is alert and cooperative and in no acute distress  CV: RRR, nl S1, S2, no lower extremity edema bilaterally  Lungs: CTA bilaterally  Abd: soft, nontender, nondistended  Ext: no calf tenderness  Neuro:  Oriented to place and situation, mild aphasia, mild dysarthria  Moving all four extremities  Right upper extremity: 2/5 shoulder abduction, 3/5 elbow flexion(delayed), 3/5 elbow extension, 2/5       Recent Results (from the past 72 hour(s))   SODIUM SERUM (NA)    Collection Time: 12/15/21  6:15 PM   Result Value Ref Range    Sodium 149 (H) 135 - 145 mmol/L   Magnesium    Collection Time: 12/16/21  4:00 AM   Result Value Ref Range    Magnesium 2.3 1.5 - 2.5 mg/dL   CBC WITH DIFFERENTIAL    Collection Time: 12/16/21  4:00 AM   Result Value Ref Range    WBC 11.9 (H) 4.8 - 10.8 K/uL    RBC 4.92 4.20 - 5.40 M/uL    Hemoglobin 15.1 12.0 - 16.0 g/dL    Hematocrit 45.4 37.0 - 47.0 %    MCV 92.3 81.4 - 97.8 fL    MCH 30.7 27.0 - 33.0 pg    MCHC 33.3 (L) 33.6 - 35.0 g/dL    RDW 48.7 35.9 - 50.0 fL    Platelet Count 196 164 - 446 K/uL    MPV 10.6 9.0 - 12.9 fL    Neutrophils-Polys 84.10 (H) 44.00 - 72.00 %    Lymphocytes 9.80 (L) 22.00 - 41.00 %    Monocytes 4.00 0.00 - 13.40 %    Eosinophils 0.10 0.00 - 6.90 %    Basophils 0.40 0.00 - 1.80 %    Immature Granulocytes 1.60 (H) 0.00 - 0.90 %    Nucleated RBC 0.00 /100 WBC    Neutrophils (Absolute) 9.97 (H) 2.00 - 7.15 K/uL    Lymphs (Absolute) 1.16 1.00 - 4.80 K/uL    Monos (Absolute) 0.47 0.00 - 0.85 K/uL    " Eos (Absolute) 0.01 0.00 - 0.51 K/uL    Baso (Absolute) 0.05 0.00 - 0.12 K/uL    Immature Granulocytes (abs) 0.19 (H) 0.00 - 0.11 K/uL    NRBC (Absolute) 0.00 K/uL   PHOSPHORUS    Collection Time: 12/16/21  4:00 AM   Result Value Ref Range    Phosphorus 1.9 (L) 2.5 - 4.5 mg/dL   Comp Metabolic Panel    Collection Time: 12/16/21  4:00 AM   Result Value Ref Range    Sodium 150 (H) 135 - 145 mmol/L    Potassium 3.6 3.6 - 5.5 mmol/L    Chloride 117 (H) 96 - 112 mmol/L    Co2 22 20 - 33 mmol/L    Anion Gap 11.0 7.0 - 16.0    Glucose 168 (H) 65 - 99 mg/dL    Bun 13 8 - 22 mg/dL    Creatinine 0.53 0.50 - 1.40 mg/dL    Calcium 9.3 8.5 - 10.5 mg/dL    AST(SGOT) 41 12 - 45 U/L    ALT(SGPT) 62 (H) 2 - 50 U/L    Alkaline Phosphatase 100 (H) 30 - 99 U/L    Total Bilirubin 0.5 0.1 - 1.5 mg/dL    Albumin 4.3 3.2 - 4.9 g/dL    Total Protein 7.6 6.0 - 8.2 g/dL    Globulin 3.3 1.9 - 3.5 g/dL    A-G Ratio 1.3 g/dL   TSH WITH REFLEX TO FT4    Collection Time: 12/16/21  4:00 AM   Result Value Ref Range    TSH 0.410 0.380 - 5.330 uIU/mL   ESTIMATED GFR    Collection Time: 12/16/21  4:00 AM   Result Value Ref Range    GFR If African American >60 >60 mL/min/1.73 m 2    GFR If Non African American >60 >60 mL/min/1.73 m 2   Magnesium    Collection Time: 12/17/21  1:28 AM   Result Value Ref Range    Magnesium 2.4 1.5 - 2.5 mg/dL   CBC WITH DIFFERENTIAL    Collection Time: 12/17/21  1:28 AM   Result Value Ref Range    WBC 11.2 (H) 4.8 - 10.8 K/uL    RBC 4.55 4.20 - 5.40 M/uL    Hemoglobin 13.6 12.0 - 16.0 g/dL    Hematocrit 41.7 37.0 - 47.0 %    MCV 91.6 81.4 - 97.8 fL    MCH 29.9 27.0 - 33.0 pg    MCHC 32.6 (L) 33.6 - 35.0 g/dL    RDW 50.0 35.9 - 50.0 fL    Platelet Count 186 164 - 446 K/uL    MPV 10.7 9.0 - 12.9 fL    Neutrophils-Polys 67.40 44.00 - 72.00 %    Lymphocytes 20.60 (L) 22.00 - 41.00 %    Monocytes 10.90 0.00 - 13.40 %    Eosinophils 0.20 0.00 - 6.90 %    Basophils 0.40 0.00 - 1.80 %    Immature Granulocytes 0.50 0.00 - 0.90 %     Nucleated RBC 0.00 /100 WBC    Neutrophils (Absolute) 7.57 (H) 2.00 - 7.15 K/uL    Lymphs (Absolute) 2.32 1.00 - 4.80 K/uL    Monos (Absolute) 1.22 (H) 0.00 - 0.85 K/uL    Eos (Absolute) 0.02 0.00 - 0.51 K/uL    Baso (Absolute) 0.05 0.00 - 0.12 K/uL    Immature Granulocytes (abs) 0.06 0.00 - 0.11 K/uL    NRBC (Absolute) 0.00 K/uL   PHOSPHORUS    Collection Time: 12/17/21  1:28 AM   Result Value Ref Range    Phosphorus 4.2 2.5 - 4.5 mg/dL   Comp Metabolic Panel    Collection Time: 12/17/21  1:28 AM   Result Value Ref Range    Sodium 151 (H) 135 - 145 mmol/L    Potassium 3.3 (L) 3.6 - 5.5 mmol/L    Chloride 114 (H) 96 - 112 mmol/L    Co2 24 20 - 33 mmol/L    Anion Gap 13.0 7.0 - 16.0    Glucose 112 (H) 65 - 99 mg/dL    Bun 19 8 - 22 mg/dL    Creatinine 0.59 0.50 - 1.40 mg/dL    Calcium 9.0 8.5 - 10.5 mg/dL    AST(SGOT) 34 12 - 45 U/L    ALT(SGPT) 60 (H) 2 - 50 U/L    Alkaline Phosphatase 89 30 - 99 U/L    Total Bilirubin 0.8 0.1 - 1.5 mg/dL    Albumin 3.8 3.2 - 4.9 g/dL    Total Protein 7.1 6.0 - 8.2 g/dL    Globulin 3.3 1.9 - 3.5 g/dL    A-G Ratio 1.2 g/dL   TSH WITH REFLEX TO FT4    Collection Time: 12/17/21  1:28 AM   Result Value Ref Range    TSH 0.600 0.380 - 5.330 uIU/mL   ESTIMATED GFR    Collection Time: 12/17/21  1:28 AM   Result Value Ref Range    GFR If African American >60 >60 mL/min/1.73 m 2    GFR If Non African American >60 >60 mL/min/1.73 m 2   HEMOGLOBIN A1C    Collection Time: 12/17/21  7:37 AM   Result Value Ref Range    Glycohemoglobin 5.6 4.0 - 5.6 %    Est Avg Glucose 114 mg/dL   SARS-CoV-2 PCR (24 hour In-House): Collect NP swab in VTM    Collection Time: 12/17/21  3:00 PM    Specimen: Nasopharyngeal; Respirate   Result Value Ref Range    SARS-CoV-2 Source NP Swab    POCT glucose device results    Collection Time: 12/17/21  4:26 PM   Result Value Ref Range    Glucose - Accu-Ck 87 65 - 99 mg/dL   URINALYSIS    Collection Time: 12/17/21  6:15 PM    Specimen: Urine, Clean Catch   Result Value Ref  Range    Color Yellow     Character Clear     Specific Gravity 1.020 <1.035    Ph 6.5 5.0 - 8.0    Glucose Negative Negative mg/dL    Ketones Trace (A) Negative mg/dL    Protein Negative Negative mg/dL    Bilirubin Negative Negative    Urobilinogen, Urine 1.0 Negative    Nitrite Negative Negative    Leukocyte Esterase Trace (A) Negative    Occult Blood Negative Negative    Micro Urine Req Microscopic    URINE MICROSCOPIC (W/UA)    Collection Time: 12/17/21  6:15 PM   Result Value Ref Range    WBC 2-5 /hpf    RBC 0-2 /hpf    Bacteria Negative None /hpf    Epithelial Cells Few /hpf    Hyaline Cast 0-2 /lpf   POCT glucose device results    Collection Time: 12/18/21 12:21 AM   Result Value Ref Range    Glucose - Accu-Ck 90 65 - 99 mg/dL   CBC with Differential    Collection Time: 12/18/21  5:27 AM   Result Value Ref Range    WBC 8.9 4.8 - 10.8 K/uL    RBC 4.58 4.20 - 5.40 M/uL    Hemoglobin 13.8 12.0 - 16.0 g/dL    Hematocrit 42.0 37.0 - 47.0 %    MCV 91.7 81.4 - 97.8 fL    MCH 30.1 27.0 - 33.0 pg    MCHC 32.9 (L) 33.6 - 35.0 g/dL    RDW 48.8 35.9 - 50.0 fL    Platelet Count 188 164 - 446 K/uL    MPV 10.9 9.0 - 12.9 fL    Neutrophils-Polys 55.00 44.00 - 72.00 %    Lymphocytes 30.00 22.00 - 41.00 %    Monocytes 10.30 0.00 - 13.40 %    Eosinophils 3.40 0.00 - 6.90 %    Basophils 0.70 0.00 - 1.80 %    Immature Granulocytes 0.60 0.00 - 0.90 %    Nucleated RBC 0.00 /100 WBC    Neutrophils (Absolute) 4.91 2.00 - 7.15 K/uL    Lymphs (Absolute) 2.68 1.00 - 4.80 K/uL    Monos (Absolute) 0.92 (H) 0.00 - 0.85 K/uL    Eos (Absolute) 0.30 0.00 - 0.51 K/uL    Baso (Absolute) 0.06 0.00 - 0.12 K/uL    Immature Granulocytes (abs) 0.05 0.00 - 0.11 K/uL    NRBC (Absolute) 0.00 K/uL   Comp Metabolic Panel (CMP)    Collection Time: 12/18/21  5:27 AM   Result Value Ref Range    Sodium 143 135 - 145 mmol/L    Potassium 3.4 (L) 3.6 - 5.5 mmol/L    Chloride 108 96 - 112 mmol/L    Co2 24 20 - 33 mmol/L    Anion Gap 11.0 7.0 - 16.0    Glucose 99  65 - 99 mg/dL    Bun 26 (H) 8 - 22 mg/dL    Creatinine 0.51 0.50 - 1.40 mg/dL    Calcium 8.9 8.5 - 10.5 mg/dL    AST(SGOT) 46 (H) 12 - 45 U/L    ALT(SGPT) 69 (H) 2 - 50 U/L    Alkaline Phosphatase 90 30 - 99 U/L    Total Bilirubin 0.7 0.1 - 1.5 mg/dL    Albumin 3.8 3.2 - 4.9 g/dL    Total Protein 6.8 6.0 - 8.2 g/dL    Globulin 3.0 1.9 - 3.5 g/dL    A-G Ratio 1.3 g/dL   Magnesium    Collection Time: 12/18/21  5:27 AM   Result Value Ref Range    Magnesium 2.2 1.5 - 2.5 mg/dL   HEPATIC FUNCTION PANEL    Collection Time: 12/18/21  5:27 AM   Result Value Ref Range    Direct Bilirubin <0.2 0.1 - 0.5 mg/dL    Indirect Bilirubin see below 0.0 - 1.0 mg/dL   ESTIMATED GFR    Collection Time: 12/18/21  5:27 AM   Result Value Ref Range    GFR If African American >60 >60 mL/min/1.73 m 2    GFR If Non African American >60 >60 mL/min/1.73 m 2   POCT glucose device results    Collection Time: 12/18/21  5:55 AM   Result Value Ref Range    Glucose - Accu-Ck 116 (H) 65 - 99 mg/dL       Current Facility-Administered Medications   Medication Frequency   • potassium chloride SA (Kdur) tablet 40 mEq Once   • [START ON 12/19/2021] potassium chloride SA (Kdur) tablet 20 mEq DAILY   • Respiratory Therapy Consult Continuous RT   • Pharmacy Consult Request ...Pain Management Review 1 Each PHARMACY TO DOSE   • docusate sodium (ENEMEEZ) enema 283 mg QDAY PRN   • sodium phosphate (Fleet) enema 133 mL QDAY PRN   • artificial tears ophthalmic solution 1 Drop PRN   • benzocaine-menthol (CEPACOL) lozenge 1 Lozenge Q2HRS PRN   • sodium chloride (OCEAN) 0.65 % nasal spray 2 Spray PRN   • midazolam (VERSED) 5 mg/mL (1 mL vial) PRN   • senna-docusate (PERICOLACE or SENOKOT S) 8.6-50 MG per tablet 2 Tablet BID    And   • polyethylene glycol/lytes (MIRALAX) PACKET 1 Packet QDAY PRN    And   • magnesium hydroxide (MILK OF MAGNESIA) suspension 30 mL QDAY PRN    And   • bisacodyl (DULCOLAX) suppository 10 mg QDAY PRN   • amLODIPine (NORVASC) tablet 10 mg Q  DAY   • hydrALAZINE (APRESOLINE) tablet 25 mg Q6HRS   • lisinopril (PRINIVIL) tablet 10 mg TWICE DAILY   • omeprazole (FIRST-OMEPRAZOLE) 2 mg/mL oral susp 40 mg DAILY   • insulin regular (HumuLIN R,NovoLIN R) injection Q6HRS    And   • dextrose 50% (D50W) injection 50 mL Q15 MIN PRN   • acetaminophen (Tylenol) tablet 650 mg Q4HRS PRN   • hydrOXYzine HCl (ATARAX) tablet 50 mg Q6HRS PRN   • lactulose 20 GM/30ML solution 30 mL QDAY PRN   • mag hydrox-al hydrox-simeth (MAALOX PLUS ES or MYLANTA DS) suspension 20 mL Q2HRS PRN   • melatonin tablet 3 mg HS PRN   • ondansetron (ZOFRAN ODT) dispertab 4 mg 4X/DAY PRN    Or   • ondansetron (ZOFRAN) syringe/vial injection 4 mg 4X/DAY PRN   • traZODone (DESYREL) tablet 50 mg QHS PRN       Orders Placed This Encounter   Procedures   • Diet: Diet Tube Feed; Formula: Fibersource HN; Goal Rate (mL/Hour): 55; Duration: 24 HR     Start at 25 ml/hour and advance per protocol to goal rate.     Standing Status:   Standing     Number of Occurrences:   1     Order Specific Question:   Diet     Answer:   Diet Tube Feed [35]     Order Specific Question:   Formula:     Answer:   Fibersource HN     Order Specific Question:   Goal Rate (mL/Hour)     Answer:   55     Order Specific Question:   Route     Answer:   Cortrak     Order Specific Question:   Duration     Answer:   24 HR   • Diet Order Diet: Level 3 - Liquidised; Liquid level: Level 2 - Mildly Thick; Tray Modifications (optional): SLP - 1:1 Supervision by Nursing, SLP - Deliver to Nursing Station     Standing Status:   Standing     Number of Occurrences:   1     Order Specific Question:   Diet:     Answer:   Level 3 - Liquidised [26]     Order Specific Question:   Liquid level     Answer:   Level 2 - Mildly Thick     Order Specific Question:   Tray Modifications (optional)     Answer:   SLP - 1:1 Supervision by Nursing     Order Specific Question:   Tray Modifications (optional)     Answer:   SLP - Deliver to Nursing Station        Assessment:  Active Hospital Problems    Diagnosis    • *Hemorrhagic stroke (HCC)    • Azotemia    • Transaminitis    • Low grade fever    • Hypernatremia    • Hypokalemia    • Primary hypertension        Medical Decision Making and Plan:  Hemorrhagic stroke  Participate full rehab program with PT/OT/SLP     Hypernatremia  Appreciate hospitalist's assistance  Discussed case with hospitalist    Hypokalemia  K+ 3.4  Discussed with hospitalist    Hypertension  Amlodipine  Hydralazine  Lisinopril  Appreciate hospitalist input with management    Leukocytosis  CXR without acute process, U/A reviewed, no bacteria  Leukocytosis resolving, afebrile    Hyperglycemia  HbA1c 5.6    Transaminitis  Statins held, mild transaminitis      Evert Campbell M.D.

## 2021-12-18 NOTE — DOCUMENTATION QUERY
[facility]                                                                       Query Response Note      PATIENT:               BRITTANY COUCH  ACCT #:                  [id number]  MRN:                     [id number]  :                      1965  ADMIT DATE:       2021 7:18 AM  DISCH DATE:        2021 12:50 PM  RESPONDING  PROVIDER #:        [id number]           QUERY TEXT:    Right sided weakness is documented in the Medical Record. Can a diagnosis be provided to support this finding?     NOTE:  If an appropriate response is not listed below, please respond with a new note.    The patient's clinical indicators include:  Per Hospital Medicine Consultation   -presented with acute right sided weakness    -found to have a left sided intracranial hemorrhage likely hypertensive in origin  -has ongoing deficit with right sided weakness, fine motor skills and dysarthria    Treatment:   Neurology Consultation, neuro checks, Physical, Occupational, & Speech Therapy Evaluations, & Rehab Consultation    Risk Factors:   Left sided intracranial hemorrhage, right sided weakness, right facial droop, & aphasia     Thank You,  Roopa Kohler RN BSN  Clinical   Connect via [employer] Messenger  Options provided:   -- Right Hemiplegia is present   -- Right Hemiparesis is present   -- Other explanation of clinical finding, (Please specify other explanation of clinical finding)   -- Finding of no clinical significance   -- Unable to determine      Query created by: Roopa Kohler on 2021 2:19 PM    RESPONSE TEXT:    Right Hemiparesis is present          Electronically signed by:  MORGAN GREENE DO 2021 7:31 AM

## 2021-12-18 NOTE — THERAPY
Physical Therapy   Initial Evaluation     Patient Name: Maryjane Gray  Age:  56 y.o., Sex:  female  Medical Record #: [id number]  Today's Date: 12/18/2021     Subjective    Patient in bed and agreeable to PT evaluation.     Objective       12/18/21 1301   Prior Living Situation   Prior Services Home-Independent   Housing / Facility 2 Story House   Steps Into Home 1   Steps In Home 12   Rail Left Rail (Steps in Home)   Elevator No   Bathroom Set up Walk In Shower;Bathtub / Shower Combination   Equipment Owned Crutches  ('s from previous ankle injury)   Lives with - Patient's Self Care Capacity Spouse;Adult Children  (32 year old daughter)   Comments Lives in [place] with her  and daughter.  works on the radio; patient reports that he works from 6-10am otherwise is available to assist. Daughter works at same pre-w[employer] school as patient.   Prior Level of Functional Mobility   Bed Mobility Independent   Transfer Status Independent   Ambulation Independent   Distance Ambulation (Feet) 1000   Assistive Devices Used None   Wheelchair Other (Comments)  (n/a)   Stairs Independent   Comments independent with all mobility, ADLs, and IADLs including driving. Reports working ~55 hours/week as a .   Prior Functioning: Everyday Activities   Self Care Independent   Indoor Mobility (Ambulation) Independent   Stairs Independent   Functional Cognition Independent   Prior Device Use None of the given options   Vitals   Pulse 74   Patient BP Position Sitting   Blood Pressure 125/77   Room Air Oximetry 95   O2 Delivery Device None - Room Air   Vitals Comments post-ambulation: 127/76 mmHg, 76 bpm, 96% SpO2 on RA   ABS (Agitated Behavior Scale)   Agitated Behavior Scale Performed Yes   Short Attention Span, Easy Distractibility, Inability to Concentrate 1   Impulsive, Impatient, Low Tolerance for Pain or Frustration 1   Uncooperative, Resistant to Care, Demanding 1   Violent and/or Threatening Violence Toward  People or Property 1   Explosive and/or Unpredictable Anger 1   Rocking, Rubbing, Moaning, Other Self-Stimulating Behavior 1   Pulling at Tubes, Restraints, etc. 1   Wandering from Treatment Area 1   Restlessness, Pacing, Excessive Movement 1   Repetitive Behaviors, Motor and/or Verbal 1   Rapid, Loud or Excessive Talking 1   Sudden Changes of Mood 1   Easily Initiated - Excessive Crying and/or Laughter 1   Self-Abusiveness, Physical and/or Verbal 1   Agitated Behavior Scale Total Score 14   Level of Severity No Agitation   Passive ROM Lower Body   Passive ROM Lower Body WDL   Active ROM Lower Body    Active ROM Lower Body  WDL   Strength Lower Body   Lower Body Strength  X   Comments LLE 5/5; RLE 4-/5 proximally, 4/5 distally   Sensation Lower Body   Lower Extremity Sensation   WDL   Comments denies numbness, tingling, changes in sensation. BLEs intact to light touch, tactile stimulation, bilateral simultaneous stimulation, proprioception (5/5 at great toes)   Lower Body Muscle Tone   Lower Body Muscle Tone  WDL   Comments intact at BLE ankles   Balance Assessment   Sitting Balance (Static) Fair +   Sitting Balance (Dynamic) Fair -   Standing Balance (Static) Poor +   Standing Balance (Dynamic) Poor   Bed Mobility    Supine to Sit Stand by Assist   Sit to Supine Stand by Assist   Sit to Stand Minimal Assist   Scooting Stand by Assist   Rolling Supervised   Coordination Lower Body    Coordination Lower Body  WDL   Comments intact with BLE foot tapping   Roll Left and Right   Assistance Needed Supervision   Physical Assistance Level No physical assistance   CARE Score - Roll Left and Right 4   Roll Left and Right Discharge Goal   Discharge Goal 6   Sit to Lying   Assistance Needed Incidental touching   Physical Assistance Level No physical assistance   CARE Score - Sit to Lying 4   Sit to Lying Discharge Goal   Discharge Goal 6   Lying to Sitting on Side of Bed   Assistance Needed Independent   Physical Assistance  Level No physical assistance   CARE Score - Lying to Sitting on Side of Bed 6   Lying to Sitting on Side of Bed Discharge Goal   Discharge Goal 6   Sit to Stand   Assistance Needed Physical assistance   Physical Assistance Level 51%-75%   CARE Score - Sit to Stand 2   Sit to Stand Discharge Goal   Discharge Goal 6   Chair/Bed-to-Chair Transfer   Assistance Needed Physical assistance   Physical Assistance Level 51%-75%   CARE Score - Chair/Bed-to-Chair Transfer 2   Chair/Bed-to-Chair Transfer Discharge Goal   Discharge Goal 6   Car Transfer   Reason if not Attempted Environmental limitations   CARE Score - Car Transfer 10   Car Transfer Discharge Goal   Discharge Goal 6   Walk 10 Feet   Assistance Needed Physical assistance   Physical Assistance Level 51%-75%   CARE Score - Walk 10 Feet 2   Walk 10 Feet Discharge Goal   Discharge Goal 6   Walk 50 Feet with Two Turns   Assistance Needed Physical assistance   Physical Assistance Level 51%-75%   CARE Score - Walk 50 Feet with Two Turns 2   Walk 50 Feet with Two Turns Discharge Goal   Discharge Goal 6   Walk 150 Feet   Assistance Needed Physical assistance   Physical Assistance Level 51%-75%   CARE Score - Walk 150 Feet 2   Walk 150 Feet Discharge Goal   Discharge Goal 6   Walking 10 Feet on Uneven Surfaces   Assistance Needed Physical assistance   Physical Assistance Level 51%-75%   CARE Score - Walking 10 Feet on Uneven Surfaces 2   Walking 10 Feet on Uneven Surfaces Discharge Goal   Discharge Goal 6   1 Step (Curb)   Assistance Needed Physical assistance   Physical Assistance Level 51%-75%   CARE Score - 1 Step (Curb) 2   1 Step (Curb) Discharge Goal   Discharge Goal 4   4 Steps   Assistance Needed Physical assistance   Physical Assistance Level 51%-75%   CARE Score - 4 Steps 2   4 Steps Discharge Goal   Discharge Goal 4   12 Steps   Assistance Needed Physical assistance   Physical Assistance Level Total assistance   CARE Score - 12 Steps 1   12 Steps Discharge Goal    Discharge Goal 4   Picking Up Object   Assistance Needed Physical assistance   Physical Assistance Level Total assistance   CARE Score - Picking Up Object 1   Picking Up Object Discharge Goal   Discharge Goal 6   Wheel 50 Feet with Two Turns   Reason if not Attempted Activity not applicable   CARE Score - Wheel 50 Feet with Two Turns 9   Wheel 50 Feet with Two Turns Discharge Goal   Discharge Goal 9   Wheel 150 Feet   Reason if not Attempted Activity not applicable   CARE Score - Wheel 150 Feet 9   Wheel 150 Feet Discharge Goal   Discharge Goal 9   Gait Functional Level of Assist    Gait Level Of Assist Minimal Assist   Assistive Device Front Wheel Walker   Distance (Feet) 50   # of Times Distance was Traveled 1   Deviation Decreased Heel Strike;Decreased Toe Off  (imp. RLE motor control with knee buckling, cues for RUE )   Transfer Functional Level of Assist   Bed, Chair, Wheelchair Transfer Contact Guard Assist  (to Min A)   Bed Chair Wheelchair Transfer Description Adaptive equipment;Increased time;Initial preparation for task;Supervision for safety;Verbal cueing  (stand step transfer with FWW vs. stand pivot w/c level)   Precautions   Precautions Fall Risk;Nasogastric Tube   Comments R hemiparesis, aspiration precautions   Current Discharge Plan   Current Discharge Plan Return to Prior Living Situation   Interdisciplinary Plan of Care Collaboration   IDT Collaboration with  Occupational Therapist   Patient Position at End of Therapy In Bed;Call Light within Reach;Tray Table within Reach;Phone within Reach   Collaboration Comments CLOF   Benefit   Therapy Benefit Patient Would Benefit from Inpatient Rehabilitation Physical Therapy to Maximize Functional [place] with ADLs, IADLs and Mobility.   Strengths & Barriers   Strengths Able to follow instructions;Effective communication skills;Independent prior level of function;Motivated for self care and independence;Manages pain appropriately;Pleasant and  cooperative;Supportive family;Willingly participates in therapeutic activities   Barriers Decreased endurance;Hemiparesis;Home accessibility;Impaired activity tolerance;Impaired balance;Limited mobility;Tube feeding   PT Total Time Spent   PT Individual Total Time Spent (Mins) 60   PT Charge Group   PT Therapeutic Activities 1   PT Evaluation PT Evaluation Mod       Assessment   Patient is 56 y.o. female with a diagnosis of L hemorrhagic stroke. Patient presented on 12/14/21 with acute onset R sided weakness; SBP on arrival was >180. Head CT revealed 3-5 cm L subcortical hemorrhage, etiology appeared to be hypertensive. She was placed on nicardipine drip with BP goals of <160/90. Repated head CT pending. Unknown PMH other than abdominal hysterectomy.    Additional factors influencing patient status / progress (ie: cognitive factors, co-morbidities, social support, etc): Patient is very pleasant and motivated to return to prior level of independence without a device, working full time as a . She lives in [place] with her  and adult daughter who patient reports will be able to assist upon discharge. She lives in a 2 story home with 1 HUMERA, full bathroom is on second floor. Primary impairments include R hemiparesis (RUE more impaired that RLE, however did present with knee buckling during mobility), decreased endurance, impaired balance, and potentially impaired cognition. Potential barriers include home accessibility as she will be required to negotiate a flight of stairs to access a full bathroom/bedroom. Patient will benefit from skilled physical therapy to address current impairments and maximize functional mobility and safety prior to discharge.    Plan  Recommend Physical Therapy  minutes per day 5-7 days per week for 17-21 days for the following treatments:  PT Group Therapy, PT Gait Training, PT Therapeutic Exercises, PT Neuro Re-Ed/Balance, PT Aquatic Therapy, PT Therapeutic Activity  and PT Evaluation.    Passport items to be completed:  Get in/out of bed safely, in/out of a vehicle, safely use mobility device, walk or wheel around home/community, navigate up and down stairs, show how to get up/down from the ground, ensure home is accessible, demonstrate HEP, complete caregiver training    Goals:  Long term and short term goals have been discussed with patient and they are in agreement.    Physical Therapy Problems (Active)     Problem: Balance     Dates: Start: 12/18/21       Goal: STG-Within one week, patient will tolerate outcome measure assessment.     Dates: Start: 12/18/21             Problem: Mobility     Dates: Start: 12/18/21       Goal: STG-Within one week, patient will ambulate household distance 150 ft with LRAD and CGA.     Dates: Start: 12/18/21          Goal: STG-Within one week, patient will ascend and descend four to six stairs with B rails and CGA.     Dates: Start: 12/18/21             Problem: Mobility Transfers     Dates: Start: 12/18/21       Goal: STG-Within one week, patient will perform bed mobility independently.     Dates: Start: 12/18/21          Goal: STG-Within one week, patient will transfer bed to chair with LRAD and CGA.     Dates: Start: 12/18/21             Problem: PT-Long Term Goals     Dates: Start: 12/18/21       Goal: LTG-By discharge, patient will ambulate >150 ft with LRAD mod I.     Dates: Start: 12/18/21          Goal: LTG-By discharge, patient will transfer one surface to another with LRAD mod I.     Dates: Start: 12/18/21          Goal: LTG-By discharge, patient will ambulate up/down flight of stairs with L rail and supervision.     Dates: Start: 12/18/21          Goal: LTG-By discharge, patient will transfer in/out of a car with LRAD and supervision.     Dates: Start: 12/18/21

## 2021-12-19 LAB
GLUCOSE BLD-MCNC: 111 MG/DL (ref 65–99)
GLUCOSE BLD-MCNC: 118 MG/DL (ref 65–99)
GLUCOSE BLD-MCNC: 131 MG/DL (ref 65–99)
GLUCOSE BLD-MCNC: 91 MG/DL (ref 65–99)

## 2021-12-19 PROCEDURE — 770010 HCHG ROOM/CARE - REHAB SEMI PRIVAT*

## 2021-12-19 PROCEDURE — 700102 HCHG RX REV CODE 250 W/ 637 OVERRIDE(OP): Performed by: PHYSICAL MEDICINE & REHABILITATION

## 2021-12-19 PROCEDURE — 97129 THER IVNTJ 1ST 15 MIN: CPT

## 2021-12-19 PROCEDURE — 97130 THER IVNTJ EA ADDL 15 MIN: CPT

## 2021-12-19 PROCEDURE — A9270 NON-COVERED ITEM OR SERVICE: HCPCS | Performed by: PHYSICAL MEDICINE & REHABILITATION

## 2021-12-19 PROCEDURE — 92526 ORAL FUNCTION THERAPY: CPT

## 2021-12-19 PROCEDURE — 700102 HCHG RX REV CODE 250 W/ 637 OVERRIDE(OP): Performed by: HOSPITALIST

## 2021-12-19 PROCEDURE — 99232 SBSQ HOSP IP/OBS MODERATE 35: CPT | Performed by: HOSPITALIST

## 2021-12-19 PROCEDURE — 82962 GLUCOSE BLOOD TEST: CPT | Mod: 91

## 2021-12-19 PROCEDURE — A9270 NON-COVERED ITEM OR SERVICE: HCPCS | Performed by: HOSPITALIST

## 2021-12-19 RX ORDER — LISINOPRIL 5 MG/1
15 TABLET ORAL TWICE DAILY
Status: DISCONTINUED | OUTPATIENT
Start: 2021-12-19 | End: 2021-12-20

## 2021-12-19 RX ADMIN — HYDRALAZINE HYDROCHLORIDE 25 MG: 25 TABLET, FILM COATED ORAL at 17:29

## 2021-12-19 RX ADMIN — LISINOPRIL 10 MG: 5 TABLET ORAL at 05:48

## 2021-12-19 RX ADMIN — HYDRALAZINE HYDROCHLORIDE 25 MG: 25 TABLET, FILM COATED ORAL at 12:06

## 2021-12-19 RX ADMIN — OMEPRAZOLE 40 MG: KIT at 09:19

## 2021-12-19 RX ADMIN — LISINOPRIL 15 MG: 5 TABLET ORAL at 17:29

## 2021-12-19 RX ADMIN — HYDRALAZINE HYDROCHLORIDE 25 MG: 25 TABLET, FILM COATED ORAL at 05:48

## 2021-12-19 RX ADMIN — AMLODIPINE BESYLATE 10 MG: 5 TABLET ORAL at 05:48

## 2021-12-19 RX ADMIN — POTASSIUM CHLORIDE 20 MEQ: 1500 TABLET, EXTENDED RELEASE ORAL at 11:36

## 2021-12-19 ASSESSMENT — ENCOUNTER SYMPTOMS
COUGH: 0
DIZZINESS: 0
BLURRED VISION: 0
NERVOUS/ANXIOUS: 0
FEVER: 0
DIARRHEA: 0

## 2021-12-19 ASSESSMENT — PATIENT HEALTH QUESTIONNAIRE - PHQ9
1. LITTLE INTEREST OR PLEASURE IN DOING THINGS: NOT AT ALL
1. LITTLE INTEREST OR PLEASURE IN DOING THINGS: NOT AT ALL
2. FEELING DOWN, DEPRESSED, IRRITABLE, OR HOPELESS: NOT AT ALL
SUM OF ALL RESPONSES TO PHQ9 QUESTIONS 1 AND 2: 0
SUM OF ALL RESPONSES TO PHQ9 QUESTIONS 1 AND 2: 0
2. FEELING DOWN, DEPRESSED, IRRITABLE, OR HOPELESS: NOT AT ALL

## 2021-12-19 ASSESSMENT — FIBROSIS 4 INDEX: FIB4 SCORE: 1.71

## 2021-12-19 ASSESSMENT — PAIN DESCRIPTION - PAIN TYPE: TYPE: ACUTE PAIN

## 2021-12-19 NOTE — CARE PLAN
Problem: Fall Risk - Rehab  Goal: Patient will remain free from falls  Outcome: Progressing   The patient is Stable - Low risk of patient condition declining or worsening    Pt is aware of safety issues & uses call light appropriately for assist with needs/transfers.

## 2021-12-19 NOTE — THERAPY
Speech Language Pathology  Daily Treatment     Patient Name: Maryjane Gray  Age:  56 y.o., Sex:  female  Medical Record #: [id number]  Today's Date: 12/19/2021     Precautions  Precautions: Fall Risk,Nasogastric Tube  Comments: R hemiparesis, aspiration precautions    Subjective    Pt pleasant and cooperative during tx.       Objective       12/19/21 1101   Dysphagia    Other Treatments trials of level 6 (peaches)    Outcome Measures   Outcome Measures Utilized SCCAN   SCCAN (Scales of Cognitive and Communicative Ability for Neurorehabilitation)   Oral Expression - Raw Score 15   Oral Expression - Scale Performance Score 79   Orientation - Raw Score 12   Orientation - Scale Performance Score 100   Memory - Raw Score 11   Memory - Scale Performance Score 58   Speech Comprehension - Raw Score 12   Speech Comprehension - Scale Performance Score 92   Problem Solving - Raw Score 18   Problem Solving - Scale Performance Score 78   SLP Total Time Spent   SLP Individual Total Time Spent (Mins) 60   Treatment Charges   SLP Swallowing Dysfunction Treatment Swallowing Dysfunction Treatment   SLP Cognitive Skill Development First 15 Minutes 1   SLP Cognitive Skill Development Additional 15 Minutes 1       Assessment    SCCAN was initiated.  Pt presented with mild deficits in oral expression (word finding), short term memory, and problem solving.  Pt tolerated level 4 textures without oral residue or s/s of aspiration.  Pt tolerated level 2 (mildly thick liquids) without overt s/s of aspiration.  Pt tolerated level 6 trial (peaches) without oral residue or s/s of aspiration.  Increased appetite this day.      Strengths: Able to follow instructions,Willingly participates in therapeutic activities,Supportive family,Pleasant and cooperative,Independent prior level of function  Barriers: Impaired functional cognition    Plan    Complete SCCAN, complete MBS, trial thin liquids and level 5-6 textures.          Speech Therapy Problems  (Active)     Problem: Expression STGs     Dates: Start: 12/18/21       Goal: STG-Within one week, patient will state 15 items per minute given concrete category and min verbal cues.       Dates: Start: 12/18/21             Problem: Problem Solving STGs     Dates: Start: 12/18/21       Goal: STG-Within one week, patient will complete SCCAN, with additional goals as deemed appropriate.       Dates: Start: 12/18/21          Goal: STG-Within one week, patient will complete executive function tasks with 80% accuracy given min verbal cues.       Dates: Start: 12/18/21             Problem: Social Interaction STGs     Dates: Start: 12/18/21       Goal: STG-Within one week, patient will     Dates: Start: 12/18/21          Goal: STG-Within one week, patient will     Dates: Start: 12/18/21             Problem: Speech/Swallowing LTGs     Dates: Start: 12/18/21       Goal: LTG-By discharge, patient will safely swallow regular textures and thin liquids without aspiration.        Dates: Start: 12/18/21          Goal: LTG-By discharge, patient will solve complex problem Luisito for safe discharge home.     Dates: Start: 12/18/21             Problem: Swallowing STGs     Dates: Start: 12/18/21       Goal: STG-Within one week, patient will complete MBS.     Dates: Start: 12/18/21          Goal: STG-Within one week, patient will tolerate level 4 textures (puree) and level 2 (mildly thick liquids) without oral residue or s/s of aspiration, over 2 meals.       Dates: Start: 12/18/21

## 2021-12-19 NOTE — DISCHARGE PLANNING
CASE MANAGEMENT INITIAL ASSESSMENT    Admit Date:  12/17/2021     Patient is a  56 y.o. female transferred from [facility] where she was admitted from 12/14-12/17 after acute onset right-sided weakness per Dr. Valverde's notes. Patient was transferred to [facility] on 12/17/2021.    Admitting physician: Dr. Valverde   PCP: Dr. Jared Kimble    Case Management has reviewed medical chart and will meet with patient and family to discuss role of case management / discharge planning / team conference.     Diagnosis: Hemorrhagic stroke (HCC) [I61.9]    Co-morbidities:   Patient Active Problem List    Diagnosis Date Noted   • Azotemia 12/18/2021   • Transaminitis 12/18/2021   • Low grade fever 12/18/2021   • Hemorrhagic stroke (HCC) 12/17/2021   • Dysphagia 12/17/2021   • Hypernatremia 12/17/2021   • Leukocytosis 12/17/2021   • Other hyperlipidemia 12/17/2021   • Vitamin D deficiency 12/17/2021   • Impaired mobility and ADLs 12/17/2021   • Hyperglycemia 12/17/2021   • Hypokalemia 12/17/2021   • Primary hypertension 12/16/2021   • Elevated liver enzymes 12/16/2021   • Intracranial hemorrhage (HCC) 12/14/2021     Prior Living Situation:  Housing / Facility: 2 Story House  Lives with - Patient's Self Care Capacity: Spouse,Adult Children (32 year old daughter)    Prior Level of Function:  Medication Management: Independent  Finances: Independent  Home Management: Independent  Shopping: Independent  Prior Level Of Mobility: Independent Without Device in Community  Driving / Transportation: Driving Independent    Support Systems:  Primary : Art Yi (spouse) [phone]    Previous Services Utilized:   Equipment Owned: Crutches (husbands )  Prior Services: Home-Independent    Other Information:  Occupation (Pre-Hospital Vocational): Employed Full Time  Primary Payor Source: Other (Comments) ([facility] )  Primary Care Practitioner : Dr. Jared Kimble    Patient / Family Goal:  Patient / Family Goal:  to discuss goals with  patient    Plan:  1. Continue to follow patient through hospitalization and provide discharge planning in collaboration with patient, family, physicians and ancillary services.     2. Utilize community resources to ensure a safe discharge.

## 2021-12-19 NOTE — PROGRESS NOTES
[facility] Medicine Daily Progress Note    Date of Service  12/19/2021    Chief Complaint:  Hypertension  Hyper-Na  Hypo-K+  Transaminitis    Interval History:  No complaints.  Doing ok.    Review of Systems  Review of Systems   Constitutional: Negative for fever.   Eyes: Negative for blurred vision.   Respiratory: Negative for cough.    Cardiovascular: Negative for chest pain.   Gastrointestinal: Negative for diarrhea.   Musculoskeletal: Negative for joint pain.   Neurological: Negative for dizziness.   Psychiatric/Behavioral: The patient is not nervous/anxious.         Physical Exam  Temp:  [36.8 °C (98.2 °F)-37.6 °C (99.6 °F)] 36.8 °C (98.2 °F)  Pulse:  [73-79] 79  Resp:  [15-16] 16  BP: (125-154)/(77-86) 146/86  SpO2:  [97 %] 97 %    Physical Exam  Vitals and nursing note reviewed.   Constitutional:       Appearance: She is not diaphoretic.   HENT:      Mouth/Throat:      Pharynx: No oropharyngeal exudate or posterior oropharyngeal erythema.   Eyes:      Extraocular Movements: Extraocular movements intact.   Neck:      Vascular: No carotid bruit or JVD.   Cardiovascular:      Rate and Rhythm: Normal rate and regular rhythm.      Heart sounds: Normal heart sounds.   Pulmonary:      Effort: Pulmonary effort is normal.      Breath sounds: No wheezing or rales.   Abdominal:      General: There is no distension.      Palpations: Abdomen is soft.      Tenderness: There is no abdominal tenderness.   Musculoskeletal:      Right lower leg: No edema.      Left lower leg: No edema.   Skin:     General: Skin is warm and dry.   Neurological:      Mental Status: She is alert and oriented to person, place, and time.   Psychiatric:         Mood and Affect: Mood normal.         Behavior: Behavior normal.         Fluids    Intake/Output Summary (Last 24 hours) at 12/19/2021 0906  Last data filed at 12/19/2021 0325  Gross per 24 hour   Intake 780 ml   Output --   Net 780 ml       Laboratory  Recent Labs     12/17/21  0121  12/18/21  0527   WBC 11.2* 8.9   RBC 4.55 4.58   HEMOGLOBIN 13.6 13.8   HEMATOCRIT 41.7 42.0   MCV 91.6 91.7   MCH 29.9 30.1   MCHC 32.6* 32.9*   RDW 50.0 48.8   PLATELETCT 186 188   MPV 10.7 10.9     Recent Labs     12/17/21  0128 12/18/21  0527   SODIUM 151* 143   POTASSIUM 3.3* 3.4*   CHLORIDE 114* 108   CO2 24 24   GLUCOSE 112* 99   BUN 19 26*   CREATININE 0.59 0.51   CALCIUM 9.0 8.9                   Imaging    Assessment/Plan  * Hemorrhagic stroke (HCC)- (present on admission)  Assessment & Plan  CT head: showed a 3 to 5 cm left subcortical hemorrhage   2nd to hypertension  Off statin 2nd to transaminitis    Low grade fever  Assessment & Plan  Tmax: 99.6  (12/19): afebrile   No leukocytosis  Denies cough  No diarrhea  Will consider further workup if low grade fever persists or elevated  Monitor for now    Transaminitis  Assessment & Plan  LFT's mildly elevated (since 12/14 on adm to [facility])  LFT's fairly stable  Pt asymptomatic  Statin d/c'd  Will get further w/u if LFT's worsen  Monitor    Azotemia  Assessment & Plan  Bun mildly elevated: 26  Free water thru NGT increased recently  Note: on a thickened liquid diet  Moitor    Hypokalemia- (present on admission)  Assessment & Plan  K+: 3.4  S/P KCL 40 meq x 1  On KCL 20 meq daily  Note: on TF's  Monitor    Hypernatremia- (present on admission)  Assessment & Plan  Currently resolved  Na: 149 --> 150 --> 151 --> 143 (12/18)  S/P IVF's 1/2 NS x 1 liter  Likely 2nd to TF's and some dehydration  Cont to monitor    Primary hypertension- (present on admission)  Assessment & Plan  BP a little elevated sometimes  On Norvasc  On Hydralazine  On Lisinopril --> will increase dose  Monitor

## 2021-12-19 NOTE — CARE PLAN
"The patient is Stable - Low risk of patient condition declining or worsening       Problem: Fall Risk - Rehab  Goal: Patient will remain free from falls  Outcome: Progressing  Note: Liz Almodovar Fall risk Assessment Score: 11    Moderate fall risk Interventions  - Bed and strip alarm   - Yellow sign by the door   - Yellow wrist band \"Fall risk\"  - Room near to the nurse station  - Do not leave patient unattended in the bathroom  - Fall risk education provided       Problem: Pain - Standard  Goal: Alleviation of pain or a reduction in pain to the patient’s comfort goal  Outcome: Progressing  Flowsheets  Taken 12/18/2021 2324  OB Pain Intervention:   Medication - See MAR   Food   Rest   Declines  Taken 12/18/2021 2100  Pain Rating Scale (NPRS): 0     "

## 2021-12-20 ENCOUNTER — APPOINTMENT (OUTPATIENT)
Dept: PAIN MANAGEMENT | Facility: REHABILITATION | Age: 56
DRG: 057 | End: 2021-12-20
Attending: PHYSICAL MEDICINE & REHABILITATION
Payer: COMMERCIAL

## 2021-12-20 ENCOUNTER — APPOINTMENT (OUTPATIENT)
Dept: RADIOLOGY | Facility: REHABILITATION | Age: 56
DRG: 057 | End: 2021-12-20
Attending: PHYSICAL MEDICINE & REHABILITATION
Payer: COMMERCIAL

## 2021-12-20 LAB
25(OH)D3 SERPL-MCNC: 20 NG/ML (ref 30–80)
ALBUMIN SERPL BCP-MCNC: 4.1 G/DL (ref 3.2–4.9)
ALBUMIN/GLOB SERPL: 1.4 G/DL
ALP SERPL-CCNC: 92 U/L (ref 30–99)
ALT SERPL-CCNC: 64 U/L (ref 2–50)
ANION GAP SERPL CALC-SCNC: 10 MMOL/L (ref 7–16)
AST SERPL-CCNC: 39 U/L (ref 12–45)
BILIRUB SERPL-MCNC: 0.5 MG/DL (ref 0.1–1.5)
BUN SERPL-MCNC: 20 MG/DL (ref 8–22)
CALCIUM SERPL-MCNC: 9.1 MG/DL (ref 8.5–10.5)
CHLORIDE SERPL-SCNC: 106 MMOL/L (ref 96–112)
CO2 SERPL-SCNC: 25 MMOL/L (ref 20–33)
CREAT SERPL-MCNC: 0.54 MG/DL (ref 0.5–1.4)
GLOBULIN SER CALC-MCNC: 3 G/DL (ref 1.9–3.5)
GLUCOSE BLD-MCNC: 89 MG/DL (ref 65–99)
GLUCOSE BLD-MCNC: 93 MG/DL (ref 65–99)
GLUCOSE SERPL-MCNC: 99 MG/DL (ref 65–99)
MAGNESIUM SERPL-MCNC: 2.5 MG/DL (ref 1.5–2.5)
PHOSPHATE SERPL-MCNC: 3 MG/DL (ref 2.5–4.5)
POTASSIUM SERPL-SCNC: 4.1 MMOL/L (ref 3.6–5.5)
PROT SERPL-MCNC: 7.1 G/DL (ref 6–8.2)
SODIUM SERPL-SCNC: 141 MMOL/L (ref 135–145)

## 2021-12-20 PROCEDURE — 92526 ORAL FUNCTION THERAPY: CPT

## 2021-12-20 PROCEDURE — 97530 THERAPEUTIC ACTIVITIES: CPT

## 2021-12-20 PROCEDURE — A9270 NON-COVERED ITEM OR SERVICE: HCPCS | Performed by: PHYSICAL MEDICINE & REHABILITATION

## 2021-12-20 PROCEDURE — 700102 HCHG RX REV CODE 250 W/ 637 OVERRIDE(OP): Performed by: HOSPITALIST

## 2021-12-20 PROCEDURE — 99232 SBSQ HOSP IP/OBS MODERATE 35: CPT | Performed by: HOSPITALIST

## 2021-12-20 PROCEDURE — 97112 NEUROMUSCULAR REEDUCATION: CPT

## 2021-12-20 PROCEDURE — 700102 HCHG RX REV CODE 250 W/ 637 OVERRIDE(OP): Performed by: PHYSICAL MEDICINE & REHABILITATION

## 2021-12-20 PROCEDURE — 82962 GLUCOSE BLOOD TEST: CPT

## 2021-12-20 PROCEDURE — 97116 GAIT TRAINING THERAPY: CPT

## 2021-12-20 PROCEDURE — 74230 X-RAY XM SWLNG FUNCJ C+: CPT

## 2021-12-20 PROCEDURE — 80053 COMPREHEN METABOLIC PANEL: CPT

## 2021-12-20 PROCEDURE — 84100 ASSAY OF PHOSPHORUS: CPT

## 2021-12-20 PROCEDURE — 92611 MOTION FLUOROSCOPY/SWALLOW: CPT

## 2021-12-20 PROCEDURE — 770010 HCHG ROOM/CARE - REHAB SEMI PRIVAT*

## 2021-12-20 PROCEDURE — 36415 COLL VENOUS BLD VENIPUNCTURE: CPT

## 2021-12-20 PROCEDURE — 99233 SBSQ HOSP IP/OBS HIGH 50: CPT | Performed by: PHYSICAL MEDICINE & REHABILITATION

## 2021-12-20 PROCEDURE — A9270 NON-COVERED ITEM OR SERVICE: HCPCS | Performed by: HOSPITALIST

## 2021-12-20 PROCEDURE — 97110 THERAPEUTIC EXERCISES: CPT

## 2021-12-20 PROCEDURE — 83735 ASSAY OF MAGNESIUM: CPT

## 2021-12-20 RX ORDER — LISINOPRIL 5 MG/1
15 TABLET ORAL TWICE DAILY
Status: DISCONTINUED | OUTPATIENT
Start: 2021-12-20 | End: 2021-12-29 | Stop reason: HOSPADM

## 2021-12-20 RX ORDER — POLYETHYLENE GLYCOL 3350 17 G/17G
1 POWDER, FOR SOLUTION ORAL
Status: DISCONTINUED | OUTPATIENT
Start: 2021-12-20 | End: 2021-12-29 | Stop reason: HOSPADM

## 2021-12-20 RX ORDER — HYDRALAZINE HYDROCHLORIDE 25 MG/1
25 TABLET, FILM COATED ORAL EVERY 8 HOURS
Status: DISCONTINUED | OUTPATIENT
Start: 2021-12-20 | End: 2021-12-21

## 2021-12-20 RX ORDER — AMOXICILLIN 250 MG
2 CAPSULE ORAL 2 TIMES DAILY
Status: DISCONTINUED | OUTPATIENT
Start: 2021-12-20 | End: 2021-12-29 | Stop reason: HOSPADM

## 2021-12-20 RX ORDER — BISACODYL 10 MG
10 SUPPOSITORY, RECTAL RECTAL
Status: DISCONTINUED | OUTPATIENT
Start: 2021-12-20 | End: 2021-12-29 | Stop reason: HOSPADM

## 2021-12-20 RX ORDER — AMLODIPINE BESYLATE 5 MG/1
10 TABLET ORAL
Status: DISCONTINUED | OUTPATIENT
Start: 2021-12-21 | End: 2021-12-29 | Stop reason: HOSPADM

## 2021-12-20 RX ADMIN — LISINOPRIL 15 MG: 5 TABLET ORAL at 17:21

## 2021-12-20 RX ADMIN — SENNOSIDES AND DOCUSATE SODIUM 2 TABLET: 50; 8.6 TABLET ORAL at 20:09

## 2021-12-20 RX ADMIN — POTASSIUM CHLORIDE 20 MEQ: 1500 TABLET, EXTENDED RELEASE ORAL at 08:41

## 2021-12-20 RX ADMIN — AMLODIPINE BESYLATE 10 MG: 5 TABLET ORAL at 05:49

## 2021-12-20 RX ADMIN — HYDRALAZINE HYDROCHLORIDE 25 MG: 25 TABLET, FILM COATED ORAL at 00:26

## 2021-12-20 RX ADMIN — LISINOPRIL 15 MG: 5 TABLET ORAL at 05:49

## 2021-12-20 RX ADMIN — HYDRALAZINE HYDROCHLORIDE 25 MG: 25 TABLET, FILM COATED ORAL at 12:13

## 2021-12-20 RX ADMIN — OMEPRAZOLE 40 MG: KIT at 08:41

## 2021-12-20 RX ADMIN — SENNOSIDES AND DOCUSATE SODIUM 2 TABLET: 50; 8.6 TABLET ORAL at 08:41

## 2021-12-20 RX ADMIN — HYDRALAZINE HYDROCHLORIDE 25 MG: 25 TABLET, FILM COATED ORAL at 05:49

## 2021-12-20 RX ADMIN — HYDRALAZINE HYDROCHLORIDE 25 MG: 25 TABLET, FILM COATED ORAL at 21:46

## 2021-12-20 ASSESSMENT — GAIT ASSESSMENTS
DEVIATION: DECREASED BASE OF SUPPORT;BRADYKINETIC;DECREASED HEEL STRIKE;DECREASED TOE OFF
GAIT LEVEL OF ASSIST: MINIMAL ASSIST
DISTANCE (FEET): 125

## 2021-12-20 ASSESSMENT — BALANCE ASSESSMENTS
LOOK OVER LEFT AND RIGHT SHOULDERS WHILE STANDING: 3
LONG VERSION TOTAL SCORE (MAX 56): 45
REACHING FORWARD WITH OUTSTRETCHED ARM WHILE STANDING: 4
SITTING TO STANDING: 3
STANDING UNSUPPORTED: 3
STANDING ON ONE LEG: 3
TRANSFERS: 3
SITTING UNSUPPORTED: 4
LONG VERSION TOTAL SCORE (MAX 56): 45
PLACE ALTERNATE FOOT ON STEP OR STOOL WHILE STANDING UNSUPPORTED: 2
STANDING UNSUPPORTED WITH FEET TOGETHER: 3
STANDING UNSUPPORTED WITH EYES CLOSED: 3
STANDING TO SITTING: 4
TURN 360 DEGREES: 4
PICK UP OBJECT FROM THE FLOOR FROM A STANDING POSITION: 3
STANDING UNSUPPORTED ONE FOOT IN FRONT: 3

## 2021-12-20 ASSESSMENT — ENCOUNTER SYMPTOMS
SHORTNESS OF BREATH: 0
ABDOMINAL PAIN: 0
NAUSEA: 0
VOMITING: 0
NERVOUS/ANXIOUS: 0
DIARRHEA: 0
CHILLS: 0
FEVER: 0

## 2021-12-20 ASSESSMENT — ACTIVITIES OF DAILY LIVING (ADL): BED_CHAIR_WHEELCHAIR_TRANSFER_DESCRIPTION: INCREASED TIME;INITIAL PREPARATION FOR TASK

## 2021-12-20 NOTE — PROGRESS NOTES
Tolerating pureed diet, remains on aspiration precautions ate <50 % for lunch but refused bolus feeding of Fibersource HN.

## 2021-12-20 NOTE — PROGRESS NOTES
"Rehab Progress Note     Date of Service: 12/20/2021  Chief Complaint: Follow-up hemorrhagic stroke    Interval Events (Subjective)    Patient seen and examined today in her room.  She reports the weekend went well.  She is hoping her NG tube can be removed soon.  She has a modified barium swallow study scheduled for this morning.  She denies any pain.  She is moving her bowel and bladder without any issues.  She continues to have right-sided weakness but otherwise has no new complaints.    ROS: No changes to bowel, bladder, pain, mood, or sleep.       Objective:  VITAL SIGNS: /80   Pulse 68   Temp 36.8 °C (98.3 °F) (Temporal)   Resp 18   Ht 1.626 m (5' 4.02\")   Wt 66 kg (145 lb 8.1 oz)   SpO2 93%   BMI 24.96 kg/m²   Gen: alert, no apparent distress  Neuro: notable for right hemiparesis    Recent Results (from the past 72 hour(s))   SARS-CoV-2 PCR (24 hour In-House): Collect NP swab in VTM    Collection Time: 12/17/21  3:00 PM    Specimen: Nasopharyngeal; Respirate   Result Value Ref Range    SARS-CoV-2 Source NP Swab     SARS-CoV-2 by PCR NotDetected    POCT glucose device results    Collection Time: 12/17/21  4:26 PM   Result Value Ref Range    Glucose - Accu-Ck 87 65 - 99 mg/dL   URINALYSIS    Collection Time: 12/17/21  6:15 PM    Specimen: Urine, Clean Catch   Result Value Ref Range    Color Yellow     Character Clear     Specific Gravity 1.020 <1.035    Ph 6.5 5.0 - 8.0    Glucose Negative Negative mg/dL    Ketones Trace (A) Negative mg/dL    Protein Negative Negative mg/dL    Bilirubin Negative Negative    Urobilinogen, Urine 1.0 Negative    Nitrite Negative Negative    Leukocyte Esterase Trace (A) Negative    Occult Blood Negative Negative    Micro Urine Req Microscopic    URINE MICROSCOPIC (W/UA)    Collection Time: 12/17/21  6:15 PM   Result Value Ref Range    WBC 2-5 /hpf    RBC 0-2 /hpf    Bacteria Negative None /hpf    Epithelial Cells Few /hpf    Hyaline Cast 0-2 /lpf   POCT glucose device " results    Collection Time: 12/18/21 12:21 AM   Result Value Ref Range    Glucose - Accu-Ck 90 65 - 99 mg/dL   CBC with Differential    Collection Time: 12/18/21  5:27 AM   Result Value Ref Range    WBC 8.9 4.8 - 10.8 K/uL    RBC 4.58 4.20 - 5.40 M/uL    Hemoglobin 13.8 12.0 - 16.0 g/dL    Hematocrit 42.0 37.0 - 47.0 %    MCV 91.7 81.4 - 97.8 fL    MCH 30.1 27.0 - 33.0 pg    MCHC 32.9 (L) 33.6 - 35.0 g/dL    RDW 48.8 35.9 - 50.0 fL    Platelet Count 188 164 - 446 K/uL    MPV 10.9 9.0 - 12.9 fL    Neutrophils-Polys 55.00 44.00 - 72.00 %    Lymphocytes 30.00 22.00 - 41.00 %    Monocytes 10.30 0.00 - 13.40 %    Eosinophils 3.40 0.00 - 6.90 %    Basophils 0.70 0.00 - 1.80 %    Immature Granulocytes 0.60 0.00 - 0.90 %    Nucleated RBC 0.00 /100 WBC    Neutrophils (Absolute) 4.91 2.00 - 7.15 K/uL    Lymphs (Absolute) 2.68 1.00 - 4.80 K/uL    Monos (Absolute) 0.92 (H) 0.00 - 0.85 K/uL    Eos (Absolute) 0.30 0.00 - 0.51 K/uL    Baso (Absolute) 0.06 0.00 - 0.12 K/uL    Immature Granulocytes (abs) 0.05 0.00 - 0.11 K/uL    NRBC (Absolute) 0.00 K/uL   Comp Metabolic Panel (CMP)    Collection Time: 12/18/21  5:27 AM   Result Value Ref Range    Sodium 143 135 - 145 mmol/L    Potassium 3.4 (L) 3.6 - 5.5 mmol/L    Chloride 108 96 - 112 mmol/L    Co2 24 20 - 33 mmol/L    Anion Gap 11.0 7.0 - 16.0    Glucose 99 65 - 99 mg/dL    Bun 26 (H) 8 - 22 mg/dL    Creatinine 0.51 0.50 - 1.40 mg/dL    Calcium 8.9 8.5 - 10.5 mg/dL    AST(SGOT) 46 (H) 12 - 45 U/L    ALT(SGPT) 69 (H) 2 - 50 U/L    Alkaline Phosphatase 90 30 - 99 U/L    Total Bilirubin 0.7 0.1 - 1.5 mg/dL    Albumin 3.8 3.2 - 4.9 g/dL    Total Protein 6.8 6.0 - 8.2 g/dL    Globulin 3.0 1.9 - 3.5 g/dL    A-G Ratio 1.3 g/dL   Magnesium    Collection Time: 12/18/21  5:27 AM   Result Value Ref Range    Magnesium 2.2 1.5 - 2.5 mg/dL   HEPATIC FUNCTION PANEL    Collection Time: 12/18/21  5:27 AM   Result Value Ref Range    Direct Bilirubin <0.2 0.1 - 0.5 mg/dL    Indirect Bilirubin see  below 0.0 - 1.0 mg/dL   ESTIMATED GFR    Collection Time: 12/18/21  5:27 AM   Result Value Ref Range    GFR If African American >60 >60 mL/min/1.73 m 2    GFR If Non African American >60 >60 mL/min/1.73 m 2   POCT glucose device results    Collection Time: 12/18/21  5:55 AM   Result Value Ref Range    Glucose - Accu-Ck 116 (H) 65 - 99 mg/dL   POCT glucose device results    Collection Time: 12/18/21 11:50 AM   Result Value Ref Range    Glucose - Accu-Ck 116 (H) 65 - 99 mg/dL   POCT glucose device results    Collection Time: 12/18/21  5:35 PM   Result Value Ref Range    Glucose - Accu-Ck 99 65 - 99 mg/dL   POCT glucose device results    Collection Time: 12/18/21 11:45 PM   Result Value Ref Range    Glucose - Accu-Ck 131 (H) 65 - 99 mg/dL   POCT glucose device results    Collection Time: 12/19/21  5:49 AM   Result Value Ref Range    Glucose - Accu-Ck 118 (H) 65 - 99 mg/dL   POCT glucose device results    Collection Time: 12/19/21 11:35 AM   Result Value Ref Range    Glucose - Accu-Ck 91 65 - 99 mg/dL   POCT glucose device results    Collection Time: 12/19/21  5:24 PM   Result Value Ref Range    Glucose - Accu-Ck 111 (H) 65 - 99 mg/dL   POCT glucose device results    Collection Time: 12/20/21 12:25 AM   Result Value Ref Range    Glucose - Accu-Ck 89 65 - 99 mg/dL   POCT glucose device results    Collection Time: 12/20/21  5:54 AM   Result Value Ref Range    Glucose - Accu-Ck 93 65 - 99 mg/dL   Comp Metabolic Panel    Collection Time: 12/20/21  6:25 AM   Result Value Ref Range    Sodium 141 135 - 145 mmol/L    Potassium 4.1 3.6 - 5.5 mmol/L    Chloride 106 96 - 112 mmol/L    Co2 25 20 - 33 mmol/L    Anion Gap 10.0 7.0 - 16.0    Glucose 99 65 - 99 mg/dL    Bun 20 8 - 22 mg/dL    Creatinine 0.54 0.50 - 1.40 mg/dL    Calcium 9.1 8.5 - 10.5 mg/dL    AST(SGOT) 39 12 - 45 U/L    ALT(SGPT) 64 (H) 2 - 50 U/L    Alkaline Phosphatase 92 30 - 99 U/L    Total Bilirubin 0.5 0.1 - 1.5 mg/dL    Albumin 4.1 3.2 - 4.9 g/dL    Total  Protein 7.1 6.0 - 8.2 g/dL    Globulin 3.0 1.9 - 3.5 g/dL    A-G Ratio 1.4 g/dL   MAGNESIUM    Collection Time: 12/20/21  6:25 AM   Result Value Ref Range    Magnesium 2.5 1.5 - 2.5 mg/dL   PHOSPHORUS    Collection Time: 12/20/21  6:25 AM   Result Value Ref Range    Phosphorus 3.0 2.5 - 4.5 mg/dL   ESTIMATED GFR    Collection Time: 12/20/21  6:25 AM   Result Value Ref Range    GFR If African American >60 >60 mL/min/1.73 m 2    GFR If Non African American >60 >60 mL/min/1.73 m 2       Current Facility-Administered Medications   Medication Frequency   • lisinopril (PRINIVIL) tablet 15 mg TWICE DAILY   • potassium chloride SA (Kdur) tablet 20 mEq DAILY   • Respiratory Therapy Consult Continuous RT   • Pharmacy Consult Request ...Pain Management Review 1 Each PHARMACY TO DOSE   • docusate sodium (ENEMEEZ) enema 283 mg QDAY PRN   • sodium phosphate (Fleet) enema 133 mL QDAY PRN   • artificial tears ophthalmic solution 1 Drop PRN   • benzocaine-menthol (CEPACOL) lozenge 1 Lozenge Q2HRS PRN   • sodium chloride (OCEAN) 0.65 % nasal spray 2 Spray PRN   • midazolam (VERSED) 5 mg/mL (1 mL vial) PRN   • senna-docusate (PERICOLACE or SENOKOT S) 8.6-50 MG per tablet 2 Tablet BID    And   • polyethylene glycol/lytes (MIRALAX) PACKET 1 Packet QDAY PRN    And   • magnesium hydroxide (MILK OF MAGNESIA) suspension 30 mL QDAY PRN    And   • bisacodyl (DULCOLAX) suppository 10 mg QDAY PRN   • amLODIPine (NORVASC) tablet 10 mg Q DAY   • hydrALAZINE (APRESOLINE) tablet 25 mg Q6HRS   • omeprazole (FIRST-OMEPRAZOLE) 2 mg/mL oral susp 40 mg DAILY   • acetaminophen (Tylenol) tablet 650 mg Q4HRS PRN   • hydrOXYzine HCl (ATARAX) tablet 50 mg Q6HRS PRN   • lactulose 20 GM/30ML solution 30 mL QDAY PRN   • mag hydrox-al hydrox-simeth (MAALOX PLUS ES or MYLANTA DS) suspension 20 mL Q2HRS PRN   • melatonin tablet 3 mg HS PRN   • ondansetron (ZOFRAN ODT) dispertab 4 mg 4X/DAY PRN    Or   • ondansetron (ZOFRAN) syringe/vial injection 4 mg 4X/DAY PRN    • traZODone (DESYREL) tablet 50 mg QHS PRN       Orders Placed This Encounter   Procedures   • Diet Order Diet: Regular (meds whole with thin, floated in puree if necessary.  Cut large pills in half.); Tray Modifications (optional): SLP - 1:1 Supervision by Nursing, SLP - Deliver to Nursing Station     Standing Status:   Standing     Number of Occurrences:   1     Order Specific Question:   Diet:     Answer:   Regular [1]     Comments:   meds whole with thin, floated in puree if necessary.  Cut large pills in half.     Order Specific Question:   Tray Modifications (optional)     Answer:   SLP - 1:1 Supervision by Nursing     Order Specific Question:   Tray Modifications (optional)     Answer:   SLP - Deliver to Nursing Station       Assessment:  Active Hospital Problems    Diagnosis    • *Hemorrhagic stroke (HCC)    • Azotemia    • Transaminitis    • Low grade fever    • Hypernatremia    • Hypokalemia    • Dysphagia    • Other hyperlipidemia    • Vitamin D deficiency    • Impaired mobility and ADLs    • Hyperglycemia    • Leukocytosis    • Primary hypertension      This patient is a 56 y.o. female admitted for acute inpatient rehabilitation with Hemorrhagic stroke (HCC).    Medical Decision Making and Plan:    Left subcortical MCA hemorrhagic stroke  Right hemiparesis  Cognitive impairment  Dysphagia, improved  Continue full rehab program  PT/OT/SLP, 1 hr each discipline, 5 days per week  Recreational therapy consult    NG tube removed today    Outpatient follow up with Dr. Murphy, stroke bridge clinic    Hypertension  Amlodipine  Hydralazine  Lisinopril  Appreciate hospitalist assistance     Hyperlipidemia  Statin discontinue due to elevated liver enzymes  Will restart after resolution     Transaminitis, improved  Recheck in 1 week     Hypernatremia, resolved  Status post 1/2 normal saline  Appreciate hospitalist assistance     Leukocytosis, resolved  Negative UA and CXR     Hyperglycemia  Likely from tube  feeds  SSI  HgbA1c 5.4    Bowel program  Continue bowel medications  Last BM 12/18    Bladder program  Check PVRs - 11, 1  Bladder scan for no voids  ICP for over 400 cc  Scheduled toileting    DVT prophylaxis  Contraindicated given hemorrhage.   Compression stockings on in am, off in pm.  Increase mobility. Monitor for swelling.    Total time:  35 minutes.  I spent greater than 50% of the time for patient care, counseling, and coordination on this date, including patient face-to face time, unit/floor time with review of records/pertinent lab data and studies, as well as discussing diagnostic evaluation/work up, planned therapeutic interventions, and future disposition of care, as per the interval events/subjective and the assessment and plan as noted above.    Vee Valverde M.D.    Physical Medicine and Rehabilitation

## 2021-12-20 NOTE — CARE PLAN
The patient is Stable - Low risk of patient condition declining or worsening    Shift Goals  Clinical Goals: safety  Patient Goals: coretrak out    Problem: Fall Risk - Rehab  Goal: Patient will remain free from falls  Outcome: Progressing patient has good safety awareness and uses the call light and has not attempted to self-transfer so far this shift.     Problem: Inadequate nutrient intake  Goal: Patient to consume greater than or equal to 50% of meals  Outcome: Progressing patient coretrak removed this am per order, patient is eating 50% or more at mealtimes.

## 2021-12-20 NOTE — PROGRESS NOTES
[facility] Daily Progress Note    Date of Service  12/20/2021    Chief Complaint:  Hypertension  Hyper-Na  Hypo-K+  Transaminitis    Interval History:  No significant events overnight.    Review of Systems  Review of Systems   Constitutional: Negative for chills and fever.   Respiratory: Negative for shortness of breath.    Cardiovascular: Negative for chest pain.   Gastrointestinal: Negative for abdominal pain, diarrhea, nausea and vomiting.   Psychiatric/Behavioral: The patient is not nervous/anxious.         Physical Exam  Temp:  [36.6 °C (97.9 °F)-36.8 °C (98.3 °F)] 36.8 °C (98.3 °F)  Pulse:  [68-84] 68  Resp:  [16-18] 18  BP: (122-147)/(71-83) 122/80  SpO2:  [93 %] 93 %    Physical Exam  Vitals and nursing note reviewed.   Constitutional:       Appearance: Normal appearance.   HENT:      Head: Atraumatic.   Eyes:      Conjunctiva/sclera: Conjunctivae normal.      Pupils: Pupils are equal, round, and reactive to light.   Cardiovascular:      Rate and Rhythm: Normal rate and regular rhythm.      Heart sounds: No murmur heard.      Pulmonary:      Effort: Pulmonary effort is normal.      Breath sounds: No stridor. No wheezing or rales.   Abdominal:      General: There is no distension.      Palpations: Abdomen is soft.      Tenderness: There is no abdominal tenderness.   Musculoskeletal:      Cervical back: Normal range of motion and neck supple.      Right lower leg: No edema.      Left lower leg: No edema.   Skin:     General: Skin is warm and dry.      Findings: No rash.   Neurological:      Mental Status: She is alert and oriented to person, place, and time.   Psychiatric:         Mood and Affect: Mood normal.         Behavior: Behavior normal.         Fluids    Intake/Output Summary (Last 24 hours) at 12/20/2021 0931  Last data filed at 12/20/2021 0219  Gross per 24 hour   Intake 1300 ml   Output --   Net 1300 ml       Laboratory  Recent Labs     12/18/21  0527   WBC 8.9   RBC 4.58   HEMOGLOBIN 13.8    HEMATOCRIT 42.0   MCV 91.7   MCH 30.1   MCHC 32.9*   RDW 48.8   PLATELETCT 188   MPV 10.9     Recent Labs     12/18/21  0527 12/20/21  0625   SODIUM 143 141   POTASSIUM 3.4* 4.1   CHLORIDE 108 106   CO2 24 25   GLUCOSE 99 99   BUN 26* 20   CREATININE 0.51 0.54   CALCIUM 8.9 9.1                   Imaging    Assessment/Plan  * Hemorrhagic stroke (HCC)- (present on admission)  Assessment & Plan  CT head: showed a 3 to 5 cm left subcortical hemorrhage   2nd to hypertension  Off statin 2nd to transaminitis    Low grade fever  Assessment & Plan  Resolved  No leukocytosis  Denies cough  No diarrhea    Transaminitis  Assessment & Plan  LFT's nearly resolved  Pt asymptomatic  Statin d/c'd  Monitor    Azotemia  Assessment & Plan  Currently resolved  Bun 20  Free water thru NGT increased recently  Note: on a thickened liquid diet  Moitor    Hypokalemia- (present on admission)  Assessment & Plan  Currently resolved  K+: 4.1 (12/20)  On KCL 20 meq daily  Monitor    Hypernatremia- (present on admission)  Assessment & Plan  Currently resolved  Na: 149 --> 150 --> 151 --> 143 (12/18) --> 141 (12/20)  S/P IVF's 1/2 NS x 1 liter  Likely 2nd to TF's and some dehydration  Cont to monitor    Primary hypertension- (present on admission)  Assessment & Plan  BP better after med cahnge  On Norvasc  On Hydralazine  On Lisinopril --> dose recently increased  Monitor

## 2021-12-20 NOTE — CARE PLAN
Problem: Inadequate nutrient intake  Goal: Patient to consume greater than or equal to 50% of meals  Outcome: Progressing

## 2021-12-20 NOTE — THERAPY
Speech Language Pathology  Daily Treatment     Patient Name: Maryjane Gray  Age:  56 y.o., Sex:  female  Medical Record #: [id number]  Today's Date: 12/20/2021     Precautions  Precautions: Fall Risk,Nasogastric Tube  Comments: R hemiparesis, aspiration precautions    Subjective    Pt pleasant and cooperative during tx.       Objective       12/20/21 0801   Dysphagia    Other Treatments trials of regular textures and thin liquids.     SLP Total Time Spent   SLP Individual Total Time Spent (Mins) 30   Treatment Charges   SLP Swallowing Dysfunction Treatment Swallowing Dysfunction Treatment       Assessment    Pt assessed at breakfast with level 4 textures, and level 2 liquids.  Pt tolerated current diet without oral residue or s/s of aspiration.  After completion of MBS pt assessed with trials of thin liquids, and regular textures.  Pt tolerated regular textures and thin liquids without overt s/s of aspiration or oral residue.      Strengths: Able to follow instructions,Willingly participates in therapeutic activities,Supportive family,Pleasant and cooperative,Independent prior level of function  Barriers: Impaired functional cognition    Plan    Upgrade to regular textures/thin liquids, meds whole with thin liquids.  Continue tdine to assess diet tolerance.  Complete SCCAN with additional goals as deemed appropriate.        Speech Therapy Problems (Active)     Problem: Expression STGs     Dates: Start: 12/18/21       Goal: STG-Within one week, patient will state 15 items per minute given concrete category and min verbal cues.       Dates: Start: 12/18/21             Problem: Problem Solving STGs     Dates: Start: 12/18/21       Goal: STG-Within one week, patient will complete SCCAN, with additional goals as deemed appropriate.       Dates: Start: 12/18/21          Goal: STG-Within one week, patient will complete executive function tasks with 80% accuracy given min verbal cues.       Dates: Start: 12/18/21              Problem: Social Interaction STGs     Dates: Start: 12/18/21       Goal: STG-Within one week, patient will     Dates: Start: 12/18/21          Goal: STG-Within one week, patient will     Dates: Start: 12/18/21             Problem: Speech/Swallowing LTGs     Dates: Start: 12/18/21       Goal: LTG-By discharge, patient will safely swallow regular textures and thin liquids without aspiration.        Dates: Start: 12/18/21          Goal: LTG-By discharge, patient will solve complex problem Luisito for safe discharge home.     Dates: Start: 12/18/21             Problem: Swallowing STGs     Dates: Start: 12/18/21       Goal: STG-Within one week, patient will complete MBS.     Dates: Start: 12/18/21          Goal: STG-Within one week, patient will tolerate level 4 textures (puree) and level 2 (mildly thick liquids) without oral residue or s/s of aspiration, over 2 meals.       Dates: Start: 12/18/21

## 2021-12-20 NOTE — DIETARY
Nutrition Care/ Consult For Tube Feeding/ Free water     Assessment:    Admitting Diagnosis: Hemorrhagic Stroke thought to be r/t HTN   Pertinent PMH: HTN, Hyperlipidemia     Additional Information: Attempted to visit patient x 2 in her room. Patient actually at [facility] during second visit.  Discussed patient with RN as patient was busy with therapy. Cortrak removed today. Patient notably refusing boluses over weekend anyway.   Nutrition screen negative for weight loss etc...  Intake majority 25-50% of meals.   No documented GI complaints.  OT for weakness and self feeding issues noted.      Appetite: presumed to be fair   Diet: PU4/ MT2   Average PO intake x 3 days: 25-50% of meals     Labs: ALT 64   Medications: Amlodipine, Hydralazine, Lisinopril, Omeprazole, 20mEqKCl daily, Senna-docusate  PRN Medications: noted  IVF: n/a     Height: 162.6 cm   Weight: 66 kg   BMI: 24.98- WNL     Skin: CDI  GI: BM 12/18   : yellow UOP  Vitals: WNL  I/Os: noted     Nutrient Needs:  Kcal: 8242-0684 kcals/day  BEE= 1226  Protein: 66-79g/day   Fluid: 1800-2000mL/day       Malnutrition risk: no risk identified at this time     Diagnosis: Inadequate oral intake r/t presumed fair appetite in setting of altered textured diet/ liquids as evidenced by intake 25-50% of meals.     Intervention/ Recommendations/POC:  1. Follow results of MBSS. Nutrition rep saw patient today, will honor food prefs.  Follow PO and add supplements if indicated. Pt declined snacks per NR notes.   2.Encourage adequate PO/fluid intake.  3. Nutrition rep to see regarding food prefs/ honor within dietary restrictions (if indicated)     Monitor/Evaluation: Monitor PO intake, weight, labs, medication adjustments, skin integrity, GI function, vitals, I/Os, and overall hydration status.  Adjust nutritional POC pending clinical outcomes.    RD following weekly.   Goal: >/= 50% oral nutrient/fluid intake to promote nutrition optimization/healing.

## 2021-12-21 DIAGNOSIS — I61.9 HEMORRHAGIC STROKE (HCC): ICD-10-CM

## 2021-12-21 PROCEDURE — 700102 HCHG RX REV CODE 250 W/ 637 OVERRIDE(OP): Performed by: PHYSICAL MEDICINE & REHABILITATION

## 2021-12-21 PROCEDURE — 97112 NEUROMUSCULAR REEDUCATION: CPT

## 2021-12-21 PROCEDURE — 97530 THERAPEUTIC ACTIVITIES: CPT

## 2021-12-21 PROCEDURE — 97535 SELF CARE MNGMENT TRAINING: CPT

## 2021-12-21 PROCEDURE — 97129 THER IVNTJ 1ST 15 MIN: CPT

## 2021-12-21 PROCEDURE — 99232 SBSQ HOSP IP/OBS MODERATE 35: CPT | Performed by: HOSPITALIST

## 2021-12-21 PROCEDURE — A9270 NON-COVERED ITEM OR SERVICE: HCPCS | Performed by: PHYSICAL MEDICINE & REHABILITATION

## 2021-12-21 PROCEDURE — 97116 GAIT TRAINING THERAPY: CPT

## 2021-12-21 PROCEDURE — 99231 SBSQ HOSP IP/OBS SF/LOW 25: CPT | Performed by: PHYSICAL MEDICINE & REHABILITATION

## 2021-12-21 PROCEDURE — 92526 ORAL FUNCTION THERAPY: CPT

## 2021-12-21 PROCEDURE — 97130 THER IVNTJ EA ADDL 15 MIN: CPT

## 2021-12-21 PROCEDURE — 770010 HCHG ROOM/CARE - REHAB SEMI PRIVAT*

## 2021-12-21 RX ORDER — HYDRALAZINE HYDROCHLORIDE 25 MG/1
25 TABLET, FILM COATED ORAL EVERY 8 HOURS
Status: DISCONTINUED | OUTPATIENT
Start: 2021-12-21 | End: 2021-12-29 | Stop reason: HOSPADM

## 2021-12-21 RX ORDER — VITAMIN B COMPLEX
1000 TABLET ORAL DAILY
Status: DISCONTINUED | OUTPATIENT
Start: 2021-12-21 | End: 2021-12-29 | Stop reason: HOSPADM

## 2021-12-21 RX ADMIN — AMLODIPINE BESYLATE 10 MG: 5 TABLET ORAL at 05:12

## 2021-12-21 RX ADMIN — POTASSIUM CHLORIDE 20 MEQ: 1500 TABLET, EXTENDED RELEASE ORAL at 08:17

## 2021-12-21 RX ADMIN — HYDRALAZINE HYDROCHLORIDE 25 MG: 25 TABLET, FILM COATED ORAL at 14:59

## 2021-12-21 RX ADMIN — HYDRALAZINE HYDROCHLORIDE 25 MG: 25 TABLET, FILM COATED ORAL at 05:12

## 2021-12-21 RX ADMIN — LISINOPRIL 15 MG: 5 TABLET ORAL at 17:27

## 2021-12-21 RX ADMIN — HYDRALAZINE HYDROCHLORIDE 25 MG: 25 TABLET, FILM COATED ORAL at 21:36

## 2021-12-21 RX ADMIN — SENNOSIDES AND DOCUSATE SODIUM 1 TABLET: 50; 8.6 TABLET ORAL at 08:17

## 2021-12-21 RX ADMIN — LISINOPRIL 15 MG: 5 TABLET ORAL at 05:11

## 2021-12-21 RX ADMIN — SENNOSIDES AND DOCUSATE SODIUM 2 TABLET: 50; 8.6 TABLET ORAL at 20:13

## 2021-12-21 RX ADMIN — Medication 1000 UNITS: at 11:23

## 2021-12-21 ASSESSMENT — ENCOUNTER SYMPTOMS
VOMITING: 0
POLYDIPSIA: 0
COUGH: 0
BRUISES/BLEEDS EASILY: 0
CHILLS: 0
ABDOMINAL PAIN: 0
SHORTNESS OF BREATH: 0
FEVER: 0
PALPITATIONS: 0
NAUSEA: 0
EYES NEGATIVE: 1

## 2021-12-21 ASSESSMENT — GAIT ASSESSMENTS
GAIT LEVEL OF ASSIST: STAND BY ASSIST
DISTANCE (FEET): 250
DEVIATION: DECREASED HEEL STRIKE;DECREASED TOE OFF

## 2021-12-21 ASSESSMENT — ACTIVITIES OF DAILY LIVING (ADL)
TUB_SHOWER_TRANSFER_DESCRIPTION: GRAB BAR;SHOWER BENCH;VERBAL CUEING;SUPERVISION FOR SAFETY
BED_CHAIR_WHEELCHAIR_TRANSFER_DESCRIPTION: INCREASED TIME;SUPERVISION FOR SAFETY

## 2021-12-21 NOTE — THERAPY
Physical Therapy   Daily Treatment     Patient Name: Maryjane Gray  Age:  56 y.o., Sex:  female  Medical Record #: [id number]  Today's Date: 12/20/2021     Precautions  Precautions: Fall Risk  Comments: R hemiparesis, aspiration precautions    Subjective    Pt received in the wheelchair and agreeable to PT tx. Pt happy to have NGT removed.     Objective       12/20/21 1031   Precautions   Precautions Fall Risk   Pain   Intervention Declines   Cognition    Level of Consciousness Alert   Sleep/Wake Cycle   Sleep & Rest Awake;Out of bed   Gait Functional Level of Assist    Gait Level Of Assist Minimal Assist   Assistive Device   (L HHA, gait belt)   Distance (Feet) 125  (2x with FWW, 1x with gait belt and HHA)   # of Times Distance was Traveled 3   Deviation Decreased Base Of Support;Bradykinetic;Decreased Heel Strike;Decreased Toe Off  (occasional shuffle on R, no knee buckling noted)   Transfer Functional Level of Assist   Bed, Chair, Wheelchair Transfer Contact Guard Assist   Bed Chair Wheelchair Transfer Description Increased time;Initial preparation for task  (SPT w/c<>mat table with no AD)   Standing Lower Body Exercises   Hamstring Curl 3 sets of 10   Marching 3 sets of 10   Mini Squat Partial;3 sets of 10   Comments With LUE support on // and CGA for safety. Observed some impaired coordination with knee flex/ext, but no overt buckling during this session.   Bed Mobility    Sit to Stand Stand by Assist   Outcome Measures   Outcome Measures Utilized Damon Balance Scale   Damon Balance Scale   Sitting Unsupported (Score 0-4) 4   Change Of Positon: Sitting To Standing (Score 0-4) 3   Change Of Positon: Standing To Sitting (Score 0-4) 4   Transfers (Score 0-4) 3   Standing Unsupported (Score 0-4) 3   Standing With Eyes Closed (Score 0-4) 3   Standing With Feet Together (Score 0-4) 3   Tandem Standing (Score 0-4) 3   Standing On One Leg (Score 0-4) 3   Turning Trunk (Feet Fixed) (Score 0-4) 3   Retrieving Objects From  Floor (Score 0-4) 3   Turning 360 Degrees (Score 0-4) 4   Stool Stepping (Score 0-4) 2   Reaching Forward While Standing (Score 0-4) 4   Damon Balance Total Score (0-56) 45   Interdisciplinary Plan of Care Collaboration   Patient Position at End of Therapy Seated;Chair Alarm On   Collaboration Comments In dining room for t-dine. Staff present.   PT Total Time Spent   PT Individual Total Time Spent (Mins) 60   PT Charge Group   PT Gait Training 2   PT Therapeutic Exercise 1   PT Therapeutic Activities 1     45/56 on Damon represents LOW fall risk category    Cues for foot clearance with ambulation when not using FWW. Pt with difficulty using FWW due to  on the R and did just as well without the AD. RLE fatigue was the limiting factor for distance.    Assessment    Pt tolerated therapy session well with significant improvement with gait and no observed knee buckling during this session. Pt also did well on the Damon, scoring in a low fall risk category. Pt reported she does a lot of getting up/down from the floor and would like to practice that.  Strengths: Able to follow instructions,Effective communication skills,Independent prior level of function,Motivated for self care and independence,Manages pain appropriately,Pleasant and cooperative,Supportive family,Willingly participates in therapeutic activities  Barriers: Decreased endurance,Hemiparesis,Home accessibility,Impaired activity tolerance,Impaired balance,Limited mobility,Tube feeding    Plan    Gait with no AD, RLE motor control and coordination, floor transfers per pt request, stairs and curb, stroke education    Passport items to be completed:  Get in/out of bed safely, in/out of a vehicle, safely use mobility device, walk or wheel around home/community, navigate up and down stairs, show how to get up/down from the ground, ensure home is accessible, demonstrate HEP, complete caregiver training    Physical Therapy Problems (Active)     Problem: Balance      Dates: Start: 12/18/21       Goal: STG-Within one week, patient will tolerate outcome measure assessment.     Dates: Start: 12/18/21             Problem: Mobility     Dates: Start: 12/18/21       Goal: STG-Within one week, patient will ambulate household distance 150 ft with LRAD and CGA.     Dates: Start: 12/18/21          Goal: STG-Within one week, patient will ascend and descend four to six stairs with B rails and CGA.     Dates: Start: 12/18/21             Problem: Mobility Transfers     Dates: Start: 12/18/21       Goal: STG-Within one week, patient will perform bed mobility independently.     Dates: Start: 12/18/21          Goal: STG-Within one week, patient will transfer bed to chair with LRAD and CGA.     Dates: Start: 12/18/21             Problem: PT-Long Term Goals     Dates: Start: 12/18/21       Goal: LTG-By discharge, patient will ambulate >150 ft with LRAD mod I.     Dates: Start: 12/18/21          Goal: LTG-By discharge, patient will transfer one surface to another with LRAD mod I.     Dates: Start: 12/18/21          Goal: LTG-By discharge, patient will ambulate up/down flight of stairs with L rail and supervision.     Dates: Start: 12/18/21          Goal: LTG-By discharge, patient will transfer in/out of a car with LRAD and supervision.     Dates: Start: 12/18/21

## 2021-12-21 NOTE — PROGRESS NOTES
Hospital Medicine Daily Progress Note      Chief Complaint:  Hypertension    Interval History:  No overnight problems.    Review of Systems  Review of Systems   Constitutional: Negative for chills and fever.   HENT: Negative.    Eyes: Negative.    Respiratory: Negative for cough and shortness of breath.    Cardiovascular: Negative for chest pain and palpitations.   Gastrointestinal: Negative for abdominal pain, nausea and vomiting.   Genitourinary: Negative.    Skin: Negative for itching and rash.   Endo/Heme/Allergies: Negative for polydipsia. Does not bruise/bleed easily.        Physical Exam  Temp:  [36.4 °C (97.6 °F)-36.7 °C (98 °F)] 36.6 °C (97.8 °F)  Pulse:  [68-77] 69  Resp:  [18] 18  BP: (118-140)/(71-82) 123/72  SpO2:  [95 %-96 %] 96 %    Physical Exam  Vitals reviewed.   Constitutional:       General: She is not in acute distress.     Appearance: Normal appearance. She is not ill-appearing.   HENT:      Head: Normocephalic and atraumatic.      Right Ear: External ear normal.      Left Ear: External ear normal.      Nose: Nose normal.      Mouth/Throat:      Pharynx: Oropharynx is clear.   Eyes:      General:         Right eye: No discharge.         Left eye: No discharge.      Extraocular Movements: Extraocular movements intact.      Conjunctiva/sclera: Conjunctivae normal.   Cardiovascular:      Rate and Rhythm: Normal rate and regular rhythm.   Pulmonary:      Effort: Pulmonary effort is normal. No respiratory distress.      Breath sounds: Normal breath sounds. No wheezing.   Abdominal:      General: Bowel sounds are normal. There is no distension.      Palpations: Abdomen is soft.      Tenderness: There is no abdominal tenderness.   Musculoskeletal:      Cervical back: Normal range of motion and neck supple.      Right lower leg: No edema.      Left lower leg: No edema.   Skin:     General: Skin is warm and dry.   Neurological:      Mental Status: She is alert and oriented to person, place, and time.          Fluids    Intake/Output Summary (Last 24 hours) at 12/21/2021 1609  Last data filed at 12/21/2021 1200  Gross per 24 hour   Intake 560 ml   Output --   Net 560 ml       Laboratory      Recent Labs     12/20/21  0625   SODIUM 141   POTASSIUM 4.1   CHLORIDE 106   CO2 25   GLUCOSE 99   BUN 20   CREATININE 0.54   CALCIUM 9.1               Assessment/Plan  * Hemorrhagic stroke (HCC)- (present on admission)  Assessment & Plan  Thought to be 2/2 HTN  Non-surgical management  Statin discontinued due to transaminitis    Hypokalemia- (present on admission)  Assessment & Plan  K+ repleted  Will discontinue KCl to avoid oversupplementation    Vitamin D deficiency- (present on admission)  Assessment & Plan  Vit D level 20  On supplementation    Primary hypertension- (present on admission)  Assessment & Plan  On Norvasc, Hydralazine, and Lisinopril  Observe blood pressure trends    Full Code

## 2021-12-21 NOTE — THERAPY
"Occupational Therapy  Daily Treatment     Patient Name: Maryjane Gray  Age:  56 y.o., Sex:  female  Medical Record #: [id number]  Today's Date: 12/21/2021     Precautions  Precautions: (P) Fall Risk  Comments: (P) R hemiparesis, aspiration precautions         Subjective    \"This hand gets worse when it is cold\"        Objective       12/21/21 0901   Precautions   Precautions Fall Risk   Comments R hemiparesis, aspiration precautions   Neuro-Muscular Treatments   Comments AAROM and reaching activity with RUE. See note for details   Interdisciplinary Plan of Care Collaboration   Patient Position at End of Therapy Seated;Phone within Reach;Tray Table within Reach;Call Light within Reach   OT Total Time Spent   OT Individual Total Time Spent (Mins) 30   OT Charge Group   OT Neuromuscular Re-education / Balance 2   Pt ambulated through hallways and gym w/ no device and SBA.     Seated at table used washcloth to complete AAROM of RUE. With R hand placed palm down on washcloth pt complete 1 rep of 20 for push/pull, clockwise/counter clockwise circles, arch, and grasp/release exercises to address RUE function and neuro re-education. Pt also completed reaching task where she stacked cones with assistance to stabilize cones while reaching. Pt required increased time and concentration for grasp and release. Pt flipped over cones w/ min A to address supination/pronation of RUE.     Assessment    Pt tolerated session well focused on RUE function. Pt reports she can see improvements and is motivated to cont to make gains.   Strengths: Able to follow instructions,Effective communication skills,Independent prior level of function,Manages pain appropriately,Motivated for self care and independence,Pleasant and cooperative,Supportive family,Willingly participates in therapeutic activities  Barriers: Hemiparesis,Impaired activity tolerance,Impaired balance    Plan    RUE neuro re-ed, FM/dexterity,  setup, musa-technique for ADLs, " standing balance/tolerance/safety, IADLs when appropriate, functional cog    Occupational Therapy Goals (Active)     Problem: Dressing     Dates: Start: 12/18/21       Goal: STG-Within one week, patient will dress UB     Dates: Start: 12/18/21       Goal Note filed on 12/18/21 1422 by Vanna Rosales, OT     1) Individualized Goal:  at  SUP level with fading verbal cues PRN.               Goal: STG-Within one week, patient will dress LB     Dates: Start: 12/18/21       Goal Note filed on 12/18/21 1422 by Vanna Rosales, OT     1) Individualized Goal:  at a Min A level with AE PRN.                Problem: Grooming     Dates: Start: 12/18/21       Goal: STG-Within one week, patient will complete grooming     Dates: Start: 12/18/21       Goal Note filed on 12/18/21 1422 by Vanna Rosales, OT     1) Individualized Goal:  at a Min A level with AE PRN to complete hair/oral care.                Problem: OT Long Term Goals     Dates: Start: 12/18/21       Goal: LTG-By discharge, patient will complete basic self care tasks     Dates: Start: 12/18/21       Goal Note filed on 12/18/21 1422 by Vanna Rosales, OT     1) Individualized Goal:  at a SUP to Mod I level with least restrictive AD and AE PRN.             Goal: LTG-By discharge, patient will perform bathroom transfers     Dates: Start: 12/18/21       Goal Note filed on 12/18/21 1422 by Vanna Rosales, OT     1) Individualized Goal:  at a SUP to Mod I level with least restrictive AD and AE PRN.            Goal: LTG-By discharge, patient will complete basic home management     Dates: Start: 12/18/21       Goal Note filed on 12/18/21 1422 by Vanna Rosales, OT     1) Individualized Goal:  at a Min A to Mod I level with least restrictive AD and AE PRN.               Problem: Toileting     Dates: Start: 12/18/21       Goal: STG-Within one week, patient will complete toileting tasks     Dates: Start: 12/18/21       Goal Note filed on 12/18/21 1422 by Vanna  ELIZABETH Rosales, OT     1) Individualized Goal:   at a Min A level with AE/AD PRN.

## 2021-12-21 NOTE — DISCHARGE PLANNING
CM spoke with patients . Answered questions.  CM explained will have more information after tomorrows first IDT.  CM available as needs arise.

## 2021-12-21 NOTE — THERAPY
Speech Language Pathology  Video Swallow     Patient Name:  Maryjane Gray  AGE:  56 y.o., SEX:  female  Medical Record #:  [id number]  Today's Date: 12/20/2021     Objective       12/20/21 1131   History / Background Information   Prior Level of Function for Eating / Swallowing Regular/Thin   Diagnosis Dysphagia   Dysphagia Symptoms Warranting Video Swallow Suspected silent aspiration with previous FEES   Dentition Intact   Procedure   Patient Seated in  WC   Seated at (Degrees) 90   Views Completed Lateral   Fluoroscopy Time 90.9   Consistencies / Presentation Method   Mildly Thick (2) - (Nectar Thick) Cup;Teaspoon   Thin (0) Straw;Cup;Teaspoon   Pureed (4) Teaspoon   Soft & Bite-Sized (6) - (Dysphagia III) Teaspoon   Regular - Easy to Chew (7)   (self - cookie)   Barium Tablet Cup   Oral Phase   Mildly Thick (2) - (Nectar Thick) Premature Spillage Into Lateral Channels;Premature Spillage Into Pyriform Sinus;Premature Spillage Into Valleculae   Thin (0) Premature Spillage Into Valleculae;Premature Spillage Into Lateral Channels;Premature Spillage Into Pyriform Sinus   Soft & Bite-Sized (6) - (Dysphagia III) Delayed Oral Transit   Regular (7) Delayed Oral Transit   Pharyngeal Phase   Mildly Thick (2) - (Nectar Thick) Penetration During Swallow;Reduced Hyo-Laryngeal Elevation   Thin (0) Penetration During Swallow;Reduced Hyo-Laryngeal Elevation   Compensatory Strategies Attempted   Compensatory Strategies Attempted Yes   Multiple Swallows clear trace residue in valleculea   Impression   Dysphagia Present Yes   Oral - Pharyngeal Mild Impairment   Prognosis   Prognosis for Improvement Good   Barriers to Improvement R labial weakness   Positive Indicators for Improvement Limited deficit, pleasant cooperative, follows directions, alert   Recommendations   Diet / Liquid Recommendation Regular (7);Thin (0)   Medication Administration  Whole with Liquid Wash  (floated if necessary in puree)   Strategies / Precautions Small  Bites;Small Sips;Multiple Swallows;No Talking while Eating;Stay Upright at Least 30 Minutes After Meals;Oral Care After Meals;Monitor Temperature and Lung Sounds   Interventions Therapeutic Dining Program for Meals;Dysphagia Therapy by SLP;Patient / Caregiver Education / Training;Oral Strengthening Exercises   SLP Contact Information (Name / Extension) MP [phone]   Interdisciplinary Plan of Care Collaboration   IDT Collaboration with  Physician   Collaboration Comments results of evaluation   SLP Total Time Spent   SLP Individual Total Time Spent (Mins) 30   Charge Group   SLP Video Swallow / FEES Videofluoroscopic Evaluation       Assessment    Modified barium swallow study completed.  Pt presented with min-mild oropharyngeal dysphagia.  Oral dysphagia was characterized by prolonged mastication and increased oral transit time; however, no oral residue observed.  Pharyngeal dysphagia was characterized by decreased hyolaryngeal excursion resulting in flash penetration of NTL liquid by spoon/cup, and thin by straw (multiple sips).  No penetration observed following thin by cup, or by spoon.  Pt tolerated regular textures without residue in valleculae or pyriform sinuses.  Pt presented with slight hesitation at the level of the UES when accepting barium tablet with thin liquid wash.      Plan    Recommend upgrade to regular textures (level 7), thin liquids (level 0), meds whole with thin, floated in puree if necessary.  Continue tdine to assess diet tolerance and use of strategies.

## 2021-12-21 NOTE — THERAPY
Occupational Therapy  Daily Treatment     Patient Name: Maryjane Gray  Age:  56 y.o., Sex:  female  Medical Record #: [id number]  Today's Date: 12/21/2021     Precautions  Precautions: (P) Fall Risk  Comments: (P) R hemiparesis, aspiration precautions         Subjective    Pt received supine in bed, agreeable to OT session with focus on ADL retraining     Objective       12/21/21 0701   Precautions   Precautions Fall Risk   Comments R hemiparesis, aspiration precautions   Cognition    Cognition / Consciousness X   Speech/ Communication Expressive Aphasia   Level of Consciousness Alert   Safety Awareness Impaired   Attention Impaired   Functional Level of Assist   Grooming Minimal Assist   Grooming Description Increased time  (assist for hair, managed oral care w/o assist seated)   Bathing Contact Guard Assist   Bathing Description Grab bar;Hand held shower;Supervision for safety;Verbal cueing  (CGA standing, completed primarily in sitting)   Upper Body Dressing Minimal Assist   Upper Body Dressing Description Assist with closures  (setup, assist for bra clasp)   Lower Body Dressing Minimal Assist   Lower Body Dressing Description Verbal cueing;Supervision for safety  (assist to tie shoes, CGA standing)   Tub / Shower Transfers Contact Guard Assist   Tub Shower Transfer Description Grab bar;Shower bench;Verbal cueing;Supervision for safety  (CGA no AD in/out of bathroom)   Supine Upper Body Exercises   Supine Upper Body Exercises Yes   Chest Fly 1 set of 15;Right  (1.5 wrist weight)   Chest Press 1 set of 15;Right  (1.5 wrist weight)   Front Arm Raise 1 set of 15;Right  (1.5 wrist weight)   Bicep Curl 1 set of 15;Right  (1.5 wrist weight)   Balance   Comments functional mobility from room to gym to cafeteria CGA with gait belt no AD   OT Total Time Spent   OT Individual Total Time Spent (Mins) 60   OT Charge Group   OT Self Care / ADL 2   OT Neuromuscular Re-education / Balance 2       Assessment    Pt tolerated  session well, making steady progress with ADLs, self initiates use of RUE during ADLs though not yet functional for all tasks, does compensate with overuse of LUE as needed. Tolerated supine UE therex well, wrist weight used due to weak grasp, min cues for mechanics, continues with decreased muscle activation throughout but improving daily.     Strengths: Able to follow instructions,Effective communication skills,Independent prior level of function,Manages pain appropriately,Motivated for self care and independence,Pleasant and cooperative,Supportive family,Willingly participates in therapeutic activities  Barriers: Hemiparesis,Impaired activity tolerance,Impaired balance    Plan    RUE neuro re-ed, FM/dexterity,  setup, musa-technique for ADLs, standing balance/tolerance/safety, IADLs when appropriate, functional cog    Occupational Therapy Goals (Active)     Problem: Dressing     Dates: Start: 12/18/21       Goal: STG-Within one week, patient will dress UB     Dates: Start: 12/18/21       Goal Note filed on 12/18/21 1422 by Vanna Rosales, OT     1) Individualized Goal:  at  SUP level with fading verbal cues PRN.               Goal: STG-Within one week, patient will dress LB     Dates: Start: 12/18/21       Goal Note filed on 12/18/21 1422 by Vanna Rosales, OT     1) Individualized Goal:  at a Min A level with AE PRN.                Problem: Grooming     Dates: Start: 12/18/21       Goal: STG-Within one week, patient will complete grooming     Dates: Start: 12/18/21       Goal Note filed on 12/18/21 1422 by Vanna Rosales, OT     1) Individualized Goal:  at a Min A level with AE PRN to complete hair/oral care.                Problem: OT Long Term Goals     Dates: Start: 12/18/21       Goal: LTG-By discharge, patient will complete basic self care tasks     Dates: Start: 12/18/21       Goal Note filed on 12/18/21 1422 by Vanna Rosales, OT     1) Individualized Goal:  at a SUP to Mod I level with  least restrictive AD and AE PRN.             Goal: LTG-By discharge, patient will perform bathroom transfers     Dates: Start: 12/18/21       Goal Note filed on 12/18/21 1422 by Vanna Rosales, OT     1) Individualized Goal:  at a SUP to Mod I level with least restrictive AD and AE PRN.            Goal: LTG-By discharge, patient will complete basic home management     Dates: Start: 12/18/21       Goal Note filed on 12/18/21 1422 by Vanna Rosales, OT     1) Individualized Goal:  at a Min A to Mod I level with least restrictive AD and AE PRN.               Problem: Toileting     Dates: Start: 12/18/21       Goal: STG-Within one week, patient will complete toileting tasks     Dates: Start: 12/18/21       Goal Note filed on 12/18/21 1422 by Vanna Rosales, OT     1) Individualized Goal:   at a Min A level with AE/AD PRN.

## 2021-12-21 NOTE — CARE PLAN
"  Problem: Fall Risk - Rehab  Goal: Patient will remain free from falls  Outcome: Progressing  Note: Liz Almodovar Fall risk Assessment Score: 12    Moderate fall risk Interventions  - Bed and strip alarm   - Yellow sign by the door   - Yellow wrist band \"Fall risk\"  - Room near to the nurse station  - Do not leave patient unattended in the bathroom  - Fall risk education provided     Problem: Pain - Standard  Goal: Alleviation of pain or a reduction in pain to the patient’s comfort goal  Outcome: Progressing  Note: Assessed for pain and discomfort.Oob to bathroom with standby assist .Denies pain,Verbalizing feeling better.    The patient is Stable - Low risk of patient condition declining or worsening    Shift Goals  Clinical Goals: safety  Patient Goals: right arm weakness resolved     Progress made toward(s) clinical / shift goals:  Pt free from fall and injury.    Patient is not progressing towards the following goals:eight arm weakness.      "

## 2021-12-21 NOTE — CARE PLAN
The patient is Stable - Low risk of patient condition declining or worsening    Shift Goals  Clinical Goals: safety  Patient Goals: coretrak out    Problem: Fall Risk - Rehab  Goal: Patient will remain free from falls  Outcome: Progressing patient patient has been using the call light consistently and has not attempted to self-transfer so far this shift.     Problem: Inadequate nutrient intake  Goal: Patient to consume greater than or equal to 50% of meals  Outcome: Progressing patient has been eating 50% or greater at mealtimes.

## 2021-12-21 NOTE — PROGRESS NOTES
Patient care assumed. Report received from [facility]  BRENDA Keith. Patient is alert and calm, resting in bed. Call light and bedside table within reach. Will continue to monitor.

## 2021-12-21 NOTE — THERAPY
Speech Language Pathology  Daily Treatment     Patient Name: Maryjane Gray  Age:  56 y.o., Sex:  female  Medical Record #: [id number]  Today's Date: 12/21/2021     Precautions  Precautions: Fall Risk  Comments: R hemiparesis, aspiration precautions    Subjective    Patient was in room at time of ST. Was willing to participate.      Objective       12/21/21 1102   Dysphagia    Diet / Liquid Recommendation Regular (7);Thin (0)   SCCAN (Scales of Cognitive and Communicative Ability for Neurorehabilitation)   Reading Comprehension - Raw Score 7   Reading Comprehension - Scale Performance Score 58   Writing - Raw Score 4   Writing - Scale Performance Score 57   Attention - Raw Score 10   Attention - Scale Performance Score 62   SCCAN Total Raw Score 71   SCCAN Degree of Severity Mild Impairment   SLP Total Time Spent   SLP Individual Total Time Spent (Mins) 60   Treatment Charges   SLP Swallowing Dysfunction Treatment Swallowing Dysfunction Treatment   SLP Cognitive Skill Development First 15 Minutes 1   SLP Cognitive Skill Development Additional 15 Minutes 1       Assessment    Patient was assessed with current regular diet textures. No overt s/sx of aspiration were noted and patient was able to demonstrate safe swallow strategies.   Completed remainder of SCCAN with a final raw score of 71 indicating mild cognitive deficits.     Strengths: Able to follow instructions,Willingly participates in therapeutic activities,Supportive family,Pleasant and cooperative,Independent prior level of function  Barriers: Impaired functional cognition    Plan    D/c Tdine, continue to address word finding, functional problem solving, and executive function.       Speech Therapy Problems (Active)     Problem: Expression STGs     Dates: Start: 12/18/21       Goal: STG-Within one week, patient will state 15 items per minute given concrete category and min verbal cues.       Dates: Start: 12/18/21             Problem: Problem Solving STGs      Dates: Start: 12/18/21       Goal: STG-Within one week, patient will complete SCCAN, with additional goals as deemed appropriate.       Dates: Start: 12/18/21          Goal: STG-Within one week, patient will complete executive function tasks with 80% accuracy given min verbal cues.       Dates: Start: 12/18/21             Problem: Social Interaction STGs     Dates: Start: 12/18/21       Goal: STG-Within one week, patient will     Dates: Start: 12/18/21          Goal: STG-Within one week, patient will     Dates: Start: 12/18/21             Problem: Speech/Swallowing LTGs     Dates: Start: 12/18/21       Goal: LTG-By discharge, patient will safely swallow regular textures and thin liquids without aspiration.        Dates: Start: 12/18/21          Goal: LTG-By discharge, patient will solve complex problem Luisito for safe discharge home.     Dates: Start: 12/18/21             Problem: Swallowing STGs     Dates: Start: 12/18/21       Goal: STG-Within one week, patient will complete MBS.     Dates: Start: 12/18/21          Goal: STG-Within one week, patient will tolerate level 4 textures (puree) and level 2 (mildly thick liquids) without oral residue or s/s of aspiration, over 2 meals.       Dates: Start: 12/18/21

## 2021-12-21 NOTE — PROGRESS NOTES
"Rehab Progress Note     Date of Service: 12/21/2021  Chief Complaint: Follow-up hemorrhagic stroke    Interval Events (Subjective)    Patient seen and examined today in her room.  Her NG tube was removed.  She denies any pain.  She is sleeping and eating well.  She denies any issues with her bowel bladder.  Vitamin D returned today as well.  Patient continues to have right-sided incoordination which she thinks is getting better.  She denies any spasms in her right arm or leg.  She has no new complaints.    ROS: No changes to bowel, bladder, pain, mood, or sleep.         Objective:  VITAL SIGNS: /77   Pulse 75   Temp 36.4 °C (97.6 °F) (Temporal)   Resp 18   Ht 1.626 m (5' 4.02\")   Wt 66 kg (145 lb 8.1 oz)   SpO2 95%   BMI 24.96 kg/m²   Gen: alert, no apparent distress  Neuro: notable for improving right hemiparesis    Recent Results (from the past 72 hour(s))   POCT glucose device results    Collection Time: 12/18/21  5:35 PM   Result Value Ref Range    Glucose - Accu-Ck 99 65 - 99 mg/dL   POCT glucose device results    Collection Time: 12/18/21 11:45 PM   Result Value Ref Range    Glucose - Accu-Ck 131 (H) 65 - 99 mg/dL   POCT glucose device results    Collection Time: 12/19/21  5:49 AM   Result Value Ref Range    Glucose - Accu-Ck 118 (H) 65 - 99 mg/dL   POCT glucose device results    Collection Time: 12/19/21 11:35 AM   Result Value Ref Range    Glucose - Accu-Ck 91 65 - 99 mg/dL   POCT glucose device results    Collection Time: 12/19/21  5:24 PM   Result Value Ref Range    Glucose - Accu-Ck 111 (H) 65 - 99 mg/dL   POCT glucose device results    Collection Time: 12/20/21 12:25 AM   Result Value Ref Range    Glucose - Accu-Ck 89 65 - 99 mg/dL   POCT glucose device results    Collection Time: 12/20/21  5:54 AM   Result Value Ref Range    Glucose - Accu-Ck 93 65 - 99 mg/dL   Comp Metabolic Panel    Collection Time: 12/20/21  6:25 AM   Result Value Ref Range    Sodium 141 135 - 145 mmol/L    Potassium 4.1 " 3.6 - 5.5 mmol/L    Chloride 106 96 - 112 mmol/L    Co2 25 20 - 33 mmol/L    Anion Gap 10.0 7.0 - 16.0    Glucose 99 65 - 99 mg/dL    Bun 20 8 - 22 mg/dL    Creatinine 0.54 0.50 - 1.40 mg/dL    Calcium 9.1 8.5 - 10.5 mg/dL    AST(SGOT) 39 12 - 45 U/L    ALT(SGPT) 64 (H) 2 - 50 U/L    Alkaline Phosphatase 92 30 - 99 U/L    Total Bilirubin 0.5 0.1 - 1.5 mg/dL    Albumin 4.1 3.2 - 4.9 g/dL    Total Protein 7.1 6.0 - 8.2 g/dL    Globulin 3.0 1.9 - 3.5 g/dL    A-G Ratio 1.4 g/dL   MAGNESIUM    Collection Time: 12/20/21  6:25 AM   Result Value Ref Range    Magnesium 2.5 1.5 - 2.5 mg/dL   PHOSPHORUS    Collection Time: 12/20/21  6:25 AM   Result Value Ref Range    Phosphorus 3.0 2.5 - 4.5 mg/dL   ESTIMATED GFR    Collection Time: 12/20/21  6:25 AM   Result Value Ref Range    GFR If African American >60 >60 mL/min/1.73 m 2    GFR If Non African American >60 >60 mL/min/1.73 m 2       Current Facility-Administered Medications   Medication Frequency   • vitamin D3 (cholecalciferol) tablet 1,000 Units DAILY   • hydrALAZINE (APRESOLINE) tablet 25 mg Q8HRS   • amLODIPine (NORVASC) tablet 10 mg Q DAY   • lisinopril (PRINIVIL) tablet 15 mg TWICE DAILY   • senna-docusate (PERICOLACE or SENOKOT S) 8.6-50 MG per tablet 2 Tablet BID    And   • polyethylene glycol/lytes (MIRALAX) PACKET 1 Packet QDAY PRN    And   • magnesium hydroxide (MILK OF MAGNESIA) suspension 30 mL QDAY PRN    And   • bisacodyl (DULCOLAX) suppository 10 mg QDAY PRN   • potassium chloride SA (Kdur) tablet 20 mEq DAILY   • Respiratory Therapy Consult Continuous RT   • Pharmacy Consult Request ...Pain Management Review 1 Each PHARMACY TO DOSE   • docusate sodium (ENEMEEZ) enema 283 mg QDAY PRN   • sodium phosphate (Fleet) enema 133 mL QDAY PRN   • artificial tears ophthalmic solution 1 Drop PRN   • benzocaine-menthol (CEPACOL) lozenge 1 Lozenge Q2HRS PRN   • sodium chloride (OCEAN) 0.65 % nasal spray 2 Spray PRN   • midazolam (VERSED) 5 mg/mL (1 mL vial) PRN   •  acetaminophen (Tylenol) tablet 650 mg Q4HRS PRN   • hydrOXYzine HCl (ATARAX) tablet 50 mg Q6HRS PRN   • lactulose 20 GM/30ML solution 30 mL QDAY PRN   • mag hydrox-al hydrox-simeth (MAALOX PLUS ES or MYLANTA DS) suspension 20 mL Q2HRS PRN   • melatonin tablet 3 mg HS PRN   • ondansetron (ZOFRAN ODT) dispertab 4 mg 4X/DAY PRN    Or   • ondansetron (ZOFRAN) syringe/vial injection 4 mg 4X/DAY PRN   • traZODone (DESYREL) tablet 50 mg QHS PRN       Orders Placed This Encounter   Procedures   • Diet Order Diet: Regular (meds whole with thin, floated in puree if necessary.  Cut large pills in half.); Tray Modifications (optional): SLP - 1:1 Supervision by Nursing, SLP - Deliver to Nursing Station     Standing Status:   Standing     Number of Occurrences:   1     Order Specific Question:   Diet:     Answer:   Regular [1]     Comments:   meds whole with thin, floated in puree if necessary.  Cut large pills in half.     Order Specific Question:   Tray Modifications (optional)     Answer:   SLP - 1:1 Supervision by Nursing     Order Specific Question:   Tray Modifications (optional)     Answer:   SLP - Deliver to Nursing Station       Assessment:  Active Hospital Problems    Diagnosis    • *Hemorrhagic stroke (HCC)    • Azotemia    • Transaminitis    • Low grade fever    • Hypernatremia    • Hypokalemia    • Dysphagia    • Other hyperlipidemia    • Vitamin D deficiency    • Impaired mobility and ADLs    • Hyperglycemia    • Leukocytosis    • Primary hypertension      This patient is a 56 y.o. female admitted for acute inpatient rehabilitation with Hemorrhagic stroke (HCC).    Therapy notes reviewed.    We will have her first weekly conference tomorrow to pick a discharge date.    Medical Decision Making and Plan:    Left subcortical MCA hemorrhagic stroke  Right hemiparesis, improved  Cognitive impairment  Dysphagia, resolved  Continue full rehab program  PT/OT/SLP, 1 hr each discipline, 5 days per week  Recreational therapy  consult    NG tube removed , diet advanced    Outpatient follow up with Dr. Murphy, stroke bridge clinic, referrals made    Hypertension  Amlodipine  Hydralazine  Lisinopril  Appreciate hospitalist assistance     Hyperlipidemia  Statin discontinue due to elevated liver enzymes  Will restart after resolution     Transaminitis, improved  Recheck in 1 week     Hypernatremia, resolved  Status post 1/2 normal saline  Appreciate hospitalist assistance     Leukocytosis, resolved  Negative UA and CXR     Hyperglycemia  Likely from tube feeds  SSI  HgbA1c 5.4    Bowel program  Continue bowel medications  Last BM 12/21    Bladder program  Check PVRs - 11, 1  Not retaining  Bladder scan for no voids  ICP for over 400 cc  Scheduled toileting    DVT prophylaxis  Contraindicated given hemorrhage.   Compression stockings on in am, off in pm.  Increase mobility. Monitor for swelling.    Total time:  16 minutes.  I spent greater than 50% of the time for patient care, counseling, and coordination on this date, including patient face-to face time, unit/floor time with review of records/pertinent lab data and studies, as well as discussing diagnostic evaluation/work up, planned therapeutic interventions, and future disposition of care, as per the interval events/subjective and the assessment and plan as noted above.    I have performed a physical exam, reviewed and updated ROS, as well as the assessment and plan today 12/21/2021. In review of note from 12/20/2021 there are no new changes except as documented above.        Vee Valverde M.D.  Physical Medicine and Rehabilitation

## 2021-12-22 LAB
ANION GAP SERPL CALC-SCNC: 10 MMOL/L (ref 7–16)
BUN SERPL-MCNC: 21 MG/DL (ref 8–22)
CALCIUM SERPL-MCNC: 9.2 MG/DL (ref 8.5–10.5)
CHLORIDE SERPL-SCNC: 105 MMOL/L (ref 96–112)
CO2 SERPL-SCNC: 26 MMOL/L (ref 20–33)
CREAT SERPL-MCNC: 0.68 MG/DL (ref 0.5–1.4)
ERYTHROCYTE [DISTWIDTH] IN BLOOD BY AUTOMATED COUNT: 47.5 FL (ref 35.9–50)
GLUCOSE SERPL-MCNC: 95 MG/DL (ref 65–99)
HCT VFR BLD AUTO: 43.3 % (ref 37–47)
HGB BLD-MCNC: 14 G/DL (ref 12–16)
MCH RBC QN AUTO: 30.2 PG (ref 27–33)
MCHC RBC AUTO-ENTMCNC: 32.3 G/DL (ref 33.6–35)
MCV RBC AUTO: 93.5 FL (ref 81.4–97.8)
PLATELET # BLD AUTO: 199 K/UL (ref 164–446)
PMV BLD AUTO: 11.5 FL (ref 9–12.9)
POTASSIUM SERPL-SCNC: 4.3 MMOL/L (ref 3.6–5.5)
RBC # BLD AUTO: 4.63 M/UL (ref 4.2–5.4)
SODIUM SERPL-SCNC: 141 MMOL/L (ref 135–145)
WBC # BLD AUTO: 8 K/UL (ref 4.8–10.8)

## 2021-12-22 PROCEDURE — 700102 HCHG RX REV CODE 250 W/ 637 OVERRIDE(OP): Performed by: PHYSICAL MEDICINE & REHABILITATION

## 2021-12-22 PROCEDURE — 97130 THER IVNTJ EA ADDL 15 MIN: CPT

## 2021-12-22 PROCEDURE — 99232 SBSQ HOSP IP/OBS MODERATE 35: CPT | Performed by: HOSPITALIST

## 2021-12-22 PROCEDURE — 770010 HCHG ROOM/CARE - REHAB SEMI PRIVAT*

## 2021-12-22 PROCEDURE — A9270 NON-COVERED ITEM OR SERVICE: HCPCS | Performed by: PHYSICAL MEDICINE & REHABILITATION

## 2021-12-22 PROCEDURE — 99233 SBSQ HOSP IP/OBS HIGH 50: CPT | Performed by: PHYSICAL MEDICINE & REHABILITATION

## 2021-12-22 PROCEDURE — 97530 THERAPEUTIC ACTIVITIES: CPT

## 2021-12-22 PROCEDURE — 36415 COLL VENOUS BLD VENIPUNCTURE: CPT

## 2021-12-22 PROCEDURE — 97112 NEUROMUSCULAR REEDUCATION: CPT

## 2021-12-22 PROCEDURE — 92507 TX SP LANG VOICE COMM INDIV: CPT

## 2021-12-22 PROCEDURE — 80048 BASIC METABOLIC PNL TOTAL CA: CPT

## 2021-12-22 PROCEDURE — 97129 THER IVNTJ 1ST 15 MIN: CPT

## 2021-12-22 PROCEDURE — 97110 THERAPEUTIC EXERCISES: CPT

## 2021-12-22 PROCEDURE — 85027 COMPLETE CBC AUTOMATED: CPT

## 2021-12-22 PROCEDURE — 97535 SELF CARE MNGMENT TRAINING: CPT

## 2021-12-22 RX ADMIN — Medication 1000 UNITS: at 07:54

## 2021-12-22 RX ADMIN — HYDRALAZINE HYDROCHLORIDE 25 MG: 25 TABLET, FILM COATED ORAL at 05:13

## 2021-12-22 RX ADMIN — SENNOSIDES AND DOCUSATE SODIUM 2 TABLET: 50; 8.6 TABLET ORAL at 07:54

## 2021-12-22 RX ADMIN — HYDRALAZINE HYDROCHLORIDE 25 MG: 25 TABLET, FILM COATED ORAL at 21:08

## 2021-12-22 RX ADMIN — HYDRALAZINE HYDROCHLORIDE 25 MG: 25 TABLET, FILM COATED ORAL at 14:32

## 2021-12-22 RX ADMIN — LISINOPRIL 15 MG: 5 TABLET ORAL at 05:12

## 2021-12-22 RX ADMIN — AMLODIPINE BESYLATE 10 MG: 5 TABLET ORAL at 05:12

## 2021-12-22 RX ADMIN — LISINOPRIL 15 MG: 5 TABLET ORAL at 17:30

## 2021-12-22 RX ADMIN — SENNOSIDES AND DOCUSATE SODIUM 2 TABLET: 50; 8.6 TABLET ORAL at 21:08

## 2021-12-22 ASSESSMENT — ENCOUNTER SYMPTOMS
PALPITATIONS: 0
FEVER: 0
ABDOMINAL PAIN: 0
BRUISES/BLEEDS EASILY: 0
POLYDIPSIA: 0
CHILLS: 0
SHORTNESS OF BREATH: 0
NAUSEA: 0
VOMITING: 0
EYES NEGATIVE: 1
COUGH: 0

## 2021-12-22 ASSESSMENT — GAIT ASSESSMENTS
GAIT LEVEL OF ASSIST: STAND BY ASSIST
DISTANCE (FEET): 150

## 2021-12-22 ASSESSMENT — ACTIVITIES OF DAILY LIVING (ADL)
TUB_SHOWER_TRANSFER_DESCRIPTION: SUPERVISION FOR SAFETY;VERBAL CUEING
BED_CHAIR_WHEELCHAIR_TRANSFER_DESCRIPTION: INCREASED TIME;SUPERVISION FOR SAFETY;VERBAL CUEING

## 2021-12-22 ASSESSMENT — PAIN DESCRIPTION - PAIN TYPE: TYPE: ACUTE PAIN

## 2021-12-22 NOTE — CARE PLAN
Problem: Grooming  Goal: STG-Within one week, patient will complete grooming  Outcome: Met     Problem: Dressing  Goal: STG-Within one week, patient will dress LB  Outcome: Met     Problem: Toileting  Goal: STG-Within one week, patient will complete toileting tasks  Outcome: Met     Problem: Dressing  Goal: STG-Within one week, patient will dress UB  Outcome: Progressing

## 2021-12-22 NOTE — THERAPY
Occupational Therapy  Daily Treatment     Patient Name: Maryjane Gray  Age:  56 y.o., Sex:  female  Medical Record #: [id number]  Today's Date: 12/22/2021     Precautions  Precautions: (P) Fall Risk  Comments: (P) R hemiparesis, aspiration precautions         Subjective    Pt received supine in bed, agreeable to OT session, continues with intermittent word finding impairment but able to communicate needs given increased time     Objective       12/22/21 0831   Precautions   Precautions Fall Risk   Comments R hemiparesis, aspiration precautions   Functional Level of Assist   Tub / Shower Transfers Contact Guard Assist   Tub Shower Transfer Description Supervision for safety;Verbal cueing  (dry run in/out of tub no GB)   Sitting Upper Body Exercises   Upper Extremity Bike Level 3 Resistance  (motomed 15 min, R ace wrap, 43/57 asymmetry)   Fine Motor / Dexterity    Fine Motor / Dexterity Yes   Fine Motor / Dexterity Interventions standing at high mat, folded clothing x10 items with BUE use, cues for more active incorporation of R hand. Completed ADL fastener board x1 repetition including zippers x5, drawstring tie, buttons x2, snaps x4, bra clasp, required min cues for more active incorporation of R hand, increased time   Interdisciplinary Plan of Care Collaboration   IDT Collaboration with  Family / Caregiver   Patient Position at End of Therapy Seated;Call Light within Reach   Collaboration Comments  present for 2nd half of therapy session, educated on progress, goals of care. Discussed bathroom DME, pt's  unable to install grab bars in rental, discussed option for suction cup grab bars with attention to maintaining suction, TTB vs sit in shower seat, rec for initial supervision with anticipation of pt being mod I within 1-2 weeks   OT Total Time Spent   OT Individual Total Time Spent (Mins) 60   OT Charge Group   OT Self Care / ADL 1   OT Neuromuscular Re-education / Balance 2   OT Therapy Activity 1        Assessment    Pt tolerated session well, focus on progression of RUE function for ADLs, continues with improvements in muscle activation and motor control but still significantly impaired, continues with R inattention requiring consistent cues for more active use of R hand during bilateral tasks, tendency to compensate with LUE.  present for 2nd half of session, receptive to education on goals of care, progress, safety, discussed bathroom DME options which he will look into this week, rec TTB vs sit in shower chair + suction cup grab bar (unable to install standard GB) as well as initial supervision for showering.     Strengths: Able to follow instructions,Effective communication skills,Independent prior level of function,Manages pain appropriately,Motivated for self care and independence,Pleasant and cooperative,Supportive family,Willingly participates in therapeutic activities  Barriers: Hemiparesis,Impaired activity tolerance,Impaired balance    Plan    RUE neuro re-ed, FM/dexterity,  setup, standing balance/tolerance/safety, IADLs, functional cog      Occupational Therapy Goals (Active)     Problem: Dressing     Dates: Start: 12/18/21       Goal: STG-Within one week, patient will dress UB     Dates: Start: 12/18/21       Goal Note filed on 12/18/21 1422 by Vanna Rosales, OT     1) Individualized Goal:  at  SUP level with fading verbal cues PRN.               Goal: STG-Within one week, patient will dress LB     Dates: Start: 12/18/21       Goal Note filed on 12/18/21 1422 by Vanna Rosales, OT     1) Individualized Goal:  at a Min A level with AE PRN.                Problem: Grooming     Dates: Start: 12/18/21       Goal: STG-Within one week, patient will complete grooming     Dates: Start: 12/18/21       Goal Note filed on 12/18/21 1422 by Vanna Rosales, OT     1) Individualized Goal:  at a Min A level with AE PRN to complete hair/oral care.                Problem: OT Long Term Goals      Dates: Start: 12/18/21       Goal: LTG-By discharge, patient will complete basic self care tasks     Dates: Start: 12/18/21       Goal Note filed on 12/18/21 1422 by Vanna Rosales, OT     1) Individualized Goal:  at a SUP to Mod I level with least restrictive AD and AE PRN.             Goal: LTG-By discharge, patient will perform bathroom transfers     Dates: Start: 12/18/21       Goal Note filed on 12/18/21 1422 by Vanna Rosales, OT     1) Individualized Goal:  at a SUP to Mod I level with least restrictive AD and AE PRN.            Goal: LTG-By discharge, patient will complete basic home management     Dates: Start: 12/18/21       Goal Note filed on 12/18/21 1422 by Vanna Rosales, OT     1) Individualized Goal:  at a Min A to Mod I level with least restrictive AD and AE PRN.               Problem: Toileting     Dates: Start: 12/18/21       Goal: STG-Within one week, patient will complete toileting tasks     Dates: Start: 12/18/21       Goal Note filed on 12/18/21 1422 by Vanna Rosales, OT     1) Individualized Goal:   at a Min A level with AE/AD PRN.

## 2021-12-22 NOTE — PROGRESS NOTES
Patient care assumed. Report received from [facility] BRENDA Keith. Patient is alert and calm, resting in bed. Call light and bedside table within reach. Will continue to monitor.

## 2021-12-22 NOTE — PROGRESS NOTES
"Rehab Progress Note     Date of Service: 12/22/2021  Chief Complaint: Follow-up hemorrhagic stroke    Interval Events (Subjective)    Patient seen and examined today in her room.  She continues to have right-sided weakness and incoordination.  She denies any pain.  She has no new complaints today.    ROS: No changes to bowel, bladder, pain, mood, or sleep.      Objective:  VITAL SIGNS: /71   Pulse 69   Temp 36.6 °C (97.9 °F) (Temporal)   Resp 18   Ht 1.626 m (5' 4.02\")   Wt 66 kg (145 lb 8.1 oz)   SpO2 93%   BMI 24.96 kg/m²   Gen: alert, no apparent distress  Neuro: notable for right-sided hemiparesis and incoordination    Recent Results (from the past 72 hour(s))   POCT glucose device results    Collection Time: 12/19/21  5:24 PM   Result Value Ref Range    Glucose - Accu-Ck 111 (H) 65 - 99 mg/dL   POCT glucose device results    Collection Time: 12/20/21 12:25 AM   Result Value Ref Range    Glucose - Accu-Ck 89 65 - 99 mg/dL   POCT glucose device results    Collection Time: 12/20/21  5:54 AM   Result Value Ref Range    Glucose - Accu-Ck 93 65 - 99 mg/dL   Comp Metabolic Panel    Collection Time: 12/20/21  6:25 AM   Result Value Ref Range    Sodium 141 135 - 145 mmol/L    Potassium 4.1 3.6 - 5.5 mmol/L    Chloride 106 96 - 112 mmol/L    Co2 25 20 - 33 mmol/L    Anion Gap 10.0 7.0 - 16.0    Glucose 99 65 - 99 mg/dL    Bun 20 8 - 22 mg/dL    Creatinine 0.54 0.50 - 1.40 mg/dL    Calcium 9.1 8.5 - 10.5 mg/dL    AST(SGOT) 39 12 - 45 U/L    ALT(SGPT) 64 (H) 2 - 50 U/L    Alkaline Phosphatase 92 30 - 99 U/L    Total Bilirubin 0.5 0.1 - 1.5 mg/dL    Albumin 4.1 3.2 - 4.9 g/dL    Total Protein 7.1 6.0 - 8.2 g/dL    Globulin 3.0 1.9 - 3.5 g/dL    A-G Ratio 1.4 g/dL   MAGNESIUM    Collection Time: 12/20/21  6:25 AM   Result Value Ref Range    Magnesium 2.5 1.5 - 2.5 mg/dL   PHOSPHORUS    Collection Time: 12/20/21  6:25 AM   Result Value Ref Range    Phosphorus 3.0 2.5 - 4.5 mg/dL   ESTIMATED GFR    Collection " Time: 12/20/21  6:25 AM   Result Value Ref Range    GFR If African American >60 >60 mL/min/1.73 m 2    GFR If Non African American >60 >60 mL/min/1.73 m 2   CBC WITHOUT DIFFERENTIAL    Collection Time: 12/22/21  5:17 AM   Result Value Ref Range    WBC 8.0 4.8 - 10.8 K/uL    RBC 4.63 4.20 - 5.40 M/uL    Hemoglobin 14.0 12.0 - 16.0 g/dL    Hematocrit 43.3 37.0 - 47.0 %    MCV 93.5 81.4 - 97.8 fL    MCH 30.2 27.0 - 33.0 pg    MCHC 32.3 (L) 33.6 - 35.0 g/dL    RDW 47.5 35.9 - 50.0 fL    Platelet Count 199 164 - 446 K/uL    MPV 11.5 9.0 - 12.9 fL   Basic Metabolic Panel    Collection Time: 12/22/21  5:17 AM   Result Value Ref Range    Sodium 141 135 - 145 mmol/L    Potassium 4.3 3.6 - 5.5 mmol/L    Chloride 105 96 - 112 mmol/L    Co2 26 20 - 33 mmol/L    Glucose 95 65 - 99 mg/dL    Bun 21 8 - 22 mg/dL    Creatinine 0.68 0.50 - 1.40 mg/dL    Calcium 9.2 8.5 - 10.5 mg/dL    Anion Gap 10.0 7.0 - 16.0   ESTIMATED GFR    Collection Time: 12/22/21  5:17 AM   Result Value Ref Range    GFR If African American >60 >60 mL/min/1.73 m 2    GFR If Non African American >60 >60 mL/min/1.73 m 2       Current Facility-Administered Medications   Medication Frequency   • vitamin D3 (cholecalciferol) tablet 1,000 Units DAILY   • hydrALAZINE (APRESOLINE) tablet 25 mg Q8HRS   • amLODIPine (NORVASC) tablet 10 mg Q DAY   • lisinopril (PRINIVIL) tablet 15 mg TWICE DAILY   • senna-docusate (PERICOLACE or SENOKOT S) 8.6-50 MG per tablet 2 Tablet BID    And   • polyethylene glycol/lytes (MIRALAX) PACKET 1 Packet QDAY PRN    And   • magnesium hydroxide (MILK OF MAGNESIA) suspension 30 mL QDAY PRN    And   • bisacodyl (DULCOLAX) suppository 10 mg QDAY PRN   • Respiratory Therapy Consult Continuous RT   • Pharmacy Consult Request ...Pain Management Review 1 Each PHARMACY TO DOSE   • docusate sodium (ENEMEEZ) enema 283 mg QDAY PRN   • sodium phosphate (Fleet) enema 133 mL QDAY PRN   • artificial tears ophthalmic solution 1 Drop PRN   •  benzocaine-menthol (CEPACOL) lozenge 1 Lozenge Q2HRS PRN   • sodium chloride (OCEAN) 0.65 % nasal spray 2 Spray PRN   • midazolam (VERSED) 5 mg/mL (1 mL vial) PRN   • acetaminophen (Tylenol) tablet 650 mg Q4HRS PRN   • hydrOXYzine HCl (ATARAX) tablet 50 mg Q6HRS PRN   • lactulose 20 GM/30ML solution 30 mL QDAY PRN   • mag hydrox-al hydrox-simeth (MAALOX PLUS ES or MYLANTA DS) suspension 20 mL Q2HRS PRN   • melatonin tablet 3 mg HS PRN   • ondansetron (ZOFRAN ODT) dispertab 4 mg 4X/DAY PRN    Or   • ondansetron (ZOFRAN) syringe/vial injection 4 mg 4X/DAY PRN   • traZODone (DESYREL) tablet 50 mg QHS PRN       Orders Placed This Encounter   Procedures   • Diet Order Diet: Regular (meds whole with thin, floated in puree if necessary.  Cut large pills in half.); Tray Modifications (optional): SLP - 1:1 Supervision by Nursing, SLP - Deliver to Nursing Station     Standing Status:   Standing     Number of Occurrences:   1     Order Specific Question:   Diet:     Answer:   Regular [1]     Comments:   meds whole with thin, floated in puree if necessary.  Cut large pills in half.     Order Specific Question:   Tray Modifications (optional)     Answer:   SLP - 1:1 Supervision by Nursing     Order Specific Question:   Tray Modifications (optional)     Answer:   SLP - Deliver to Nursing Station       Assessment:  Active Hospital Problems    Diagnosis    • *Hemorrhagic stroke (HCC)    • Azotemia    • Transaminitis    • Low grade fever    • Hypernatremia    • Hypokalemia    • Dysphagia    • Other hyperlipidemia    • Vitamin D deficiency    • Impaired mobility and ADLs    • Hyperglycemia    • Leukocytosis    • Primary hypertension      This patient is a 56 y.o. female admitted for acute inpatient rehabilitation with Hemorrhagic stroke (HCC).    I led and attended the weekly conference today, and agree with the IDT conference documentation and plan of care as noted below.     Date of conference: 12/22/2021    Goals and barriers: See  IDT note.    Biggest barriers: impaired balance, right sided weakness/incoordination, right neglect, impaired memory    CM/social support:  supportive    Anticipated DC date: 12/29    Outpatient OT/PT/SLP    Equip: none needed    Follow up: PCP, stroke bridge clinic, Dr. Murphy        Medical Decision Making and Plan:    Left subcortical MCA hemorrhagic stroke  Right hemiparesis, improved  Right sided incoordination  Right neglect  Cognitive impairment  Dysphagia, resolved  Continue full rehab program  PT/OT/SLP, 1 hr each discipline, 5 days per week  Recreational therapy consult    NG tube removed , diet advanced    Outpatient follow up with Dr. Murphy, stroke bridge clinic, referrals made    Hypertension  Amlodipine  Hydralazine  Lisinopril  Hospitalist signed off    Hyperlipidemia  Statin discontinue due to elevated liver enzymes  Will restart after resolution     Transaminitis, improved  Recheck in 1 week, 12/27     Hypernatremia, resolved  Status post 1/2 normal saline  Appreciate hospitalist assistance     Leukocytosis, resolved  Negative UA and CXR     Hyperglycemia  Likely from tube feeds  SSI  HgbA1c 5.4    Bowel program  Continue bowel medications  Last BM 12/21    Bladder program  Check PVRs - 11, 1  Not retaining  Bladder scan for no voids  ICP for over 400 cc  Scheduled toileting    DVT prophylaxis  Contraindicated given hemorrhage.   Compression stockings on in am, off in pm.  Increase mobility. Monitor for swelling.    Total time:  40 minutes.  I spent greater than 50% of the time for patient care, counseling, and coordination on this date, including patient face-to face time, unit/floor time with review of records/pertinent lab data and studies, as well as discussing diagnostic evaluation/work up, planned therapeutic interventions, and future disposition of care, as per the interval events/subjective and the assessment and plan as noted above.          Vee Valverde M.D.  Physical Medicine  and Rehabilitation

## 2021-12-22 NOTE — THERAPY
"Speech Language Pathology  Daily Treatment     Patient Name: Maryjane Gray  Age:  56 y.o., Sex:  female  Medical Record #: [id number]  Today's Date: 12/22/2021     Precautions  Precautions: Fall Risk  Comments: R hemiparesis, aspiration precautions    Subjective    Pt pleasant and cooperative, seen for 2 thirty min sessions on this date.      Objective       12/22/21 0733   SLP Total Time Spent   SLP Individual Total Time Spent (Mins) 60   Treatment Charges   SLP Treatment - Individual Speech Language Treatment - Individual   SLP Cognitive Skill Development First 15 Minutes 1   SLP Cognitive Skill Development Additional 15 Minutes 1     Assessment    7:30 - pt presented with language task to address word finding, pt given concrete category and asked to name a target with the given letter. Pt stating \"my brain is just shutting down\" and \"what am I looking for again\" forgetting the name of category and target letter. Pt indep completed 7/16 trials, increased to 13/17 with semantic cues and 17/17 with mult clues provided. At the end of the task pt stating \"I use to play these games all the time, and I was always the one that was winning\" Ongoing eduction provided re: stroke and impact on language/word finding.     12:30 - pt presented with attention task with use of who has more (coins), pt completed for 20 min and correctly calculated 4/17 trials indep. Unable to review due to time constraints, rec: breakdown of task, simpler money management with cont completion as appropriate.     Strengths: Able to follow instructions,Willingly participates in therapeutic activities,Supportive family,Pleasant and cooperative,Independent prior level of function  Barriers: Impaired functional cognition    Plan    Cont to address word finding, attention and functional problem solving     Speech Therapy Problems (Active)     Problem: Expression STGs     Dates: Start: 12/18/21       Goal: STG-Within one week, patient will state 15 items " per minute given concrete category and min verbal cues.       Dates: Start: 12/18/21             Problem: Problem Solving STGs     Dates: Start: 12/18/21       Goal: STG-Within one week, patient will complete SCCAN, with additional goals as deemed appropriate.       Dates: Start: 12/18/21          Goal: STG-Within one week, patient will complete executive function tasks with 80% accuracy given min verbal cues.       Dates: Start: 12/18/21             Problem: Social Interaction STGs     Dates: Start: 12/18/21       Goal: STG-Within one week, patient will     Dates: Start: 12/18/21          Goal: STG-Within one week, patient will     Dates: Start: 12/18/21             Problem: Speech/Swallowing LTGs     Dates: Start: 12/18/21       Goal: LTG-By discharge, patient will safely swallow regular textures and thin liquids without aspiration.        Dates: Start: 12/18/21          Goal: LTG-By discharge, patient will solve complex problem Luisito for safe discharge home.     Dates: Start: 12/18/21             Problem: Swallowing STGs     Dates: Start: 12/18/21       Goal: STG-Within one week, patient will complete MBS.     Dates: Start: 12/18/21          Goal: STG-Within one week, patient will tolerate level 4 textures (puree) and level 2 (mildly thick liquids) without oral residue or s/s of aspiration, over 2 meals.       Dates: Start: 12/18/21

## 2021-12-22 NOTE — CARE PLAN
"Pt right arm able to move stated to gain strengtht as per pt.     Problem: Fall Risk - Rehab  Goal: Patient will remain free from falls  Outcome: Progressing  Note: Liz Almodovar Fall risk Assessment Score: 12    Moderate fall risk Interventions  - Bed and strip alarm   - Yellow sign by the door   - Yellow wrist band \"Fall risk\"  - Room near to the nurse station  - Do not leave patient unattended in the bathroom  - Fall risk education provided       Problem: Pain - Standard  Goal: Alleviation of pain or a reduction in pain to the patient’s comfort goal  Outcome: Progressing  Note: Assessed for pain and discomfort, denies pain .   The patient is Stable - Low risk of patient condition declining or worsening    Shift Goals  Clinical Goals: safety  Patient Goals: coretrak out    Progress made toward(s) clinical / shift goals:  Pt free from fall and injury.    "

## 2021-12-22 NOTE — PROGRESS NOTES
Hospital Medicine Daily Progress Note      Chief Complaint:  Hypertension    Interval History:  Pt denies new complaints.  Labs reviewed and unremarkable.    Review of Systems  Review of Systems   Constitutional: Negative for chills and fever.   HENT: Negative.    Eyes: Negative.    Respiratory: Negative for cough and shortness of breath.    Cardiovascular: Negative for chest pain and palpitations.   Gastrointestinal: Negative for abdominal pain, nausea and vomiting.   Genitourinary: Negative.    Skin: Negative for itching and rash.   Endo/Heme/Allergies: Negative for polydipsia. Does not bruise/bleed easily.        Physical Exam  Temp:  [36.6 °C (97.8 °F)-36.6 °C (97.9 °F)] 36.6 °C (97.9 °F)  Pulse:  [62-72] 69  Resp:  [18] 18  BP: (109-124)/(68-75) 119/71  SpO2:  [93 %-96 %] 93 %    Physical Exam  Vitals reviewed.   Constitutional:       General: She is not in acute distress.     Appearance: Normal appearance. She is not ill-appearing.   HENT:      Head: Normocephalic and atraumatic.      Right Ear: External ear normal.      Left Ear: External ear normal.      Nose: Nose normal.      Mouth/Throat:      Pharynx: Oropharynx is clear.   Eyes:      General:         Right eye: No discharge.         Left eye: No discharge.      Extraocular Movements: Extraocular movements intact.      Conjunctiva/sclera: Conjunctivae normal.   Cardiovascular:      Rate and Rhythm: Normal rate and regular rhythm.   Pulmonary:      Effort: Pulmonary effort is normal. No respiratory distress.      Breath sounds: Normal breath sounds. No wheezing.   Abdominal:      General: Bowel sounds are normal. There is no distension.      Palpations: Abdomen is soft.      Tenderness: There is no abdominal tenderness.   Musculoskeletal:      Cervical back: Normal range of motion and neck supple.      Right lower leg: No edema.      Left lower leg: No edema.   Skin:     General: Skin is warm and dry.   Neurological:      Mental Status: She is alert and  oriented to person, place, and time.         Fluids    Intake/Output Summary (Last 24 hours) at 12/22/2021 1233  Last data filed at 12/22/2021 0830  Gross per 24 hour   Intake 720 ml   Output --   Net 720 ml       Laboratory  Recent Labs     12/22/21  0517   WBC 8.0   RBC 4.63   HEMOGLOBIN 14.0   HEMATOCRIT 43.3   MCV 93.5   MCH 30.2   MCHC 32.3*   RDW 47.5   PLATELETCT 199   MPV 11.5     Recent Labs     12/20/21  0625 12/22/21  0517   SODIUM 141 141   POTASSIUM 4.1 4.3   CHLORIDE 106 105   CO2 25 26   GLUCOSE 99 95   BUN 20 21   CREATININE 0.54 0.68   CALCIUM 9.1 9.2               Assessment/Plan  * Hemorrhagic stroke (HCC)- (present on admission)  Assessment & Plan  Thought to be 2/2 HTN  Non-surgical management  Statin discontinued due to transaminitis    Vitamin D deficiency- (present on admission)  Assessment & Plan  Vit D level 20  On supplementation    Primary hypertension- (present on admission)  Assessment & Plan  Blood pressure controlled on Norvasc, Hydralazine, and Lisinopril    Full Code    Pt w/o acute medical issues requiring Hospitalist services at this time.  Will sign off.  Please re-consult as needed.  Discussed w/ Dr. Valverde.

## 2021-12-22 NOTE — CARE PLAN
Problem: Inadequate nutrient intake  Goal: Patient to consume greater than or equal to 50% of meals  Outcome: Met    Pt's PO intake has improved. Pt most likely enjoying diet texture upgrade to regular texture. PO consistently > 50% with average PO of 64% since 12/20. RD will continue to follow weekly or PRN.

## 2021-12-22 NOTE — CARE PLAN
Problem: Swallowing STGs  Goal: STG-Within one week, patient will complete MBS.  Outcome: Met  Note: MBSS completed 12/20/21.  Recommended advancing diet to (7) regular and (0) thin liquids. T-dine discontinued 12/21/21.  Goal: STG-Within one week, patient will tolerate level 4 textures (puree) and level 2 (mildly thick liquids) without oral residue or s/s of aspiration, over 2 meals.    Outcome: Met  Note: Exceeded. Safely swallowing (7) regular diet textures and (0) thinliquids.     Problem: Expression STGs  Goal: STG-Within one week, patient will state 15 items per minute given concrete category and min verbal cues.    Outcome: Not Met  Note: Patient is generating 1 target for given target category, 7/16     Problem: Problem Solving STGs  Goal: STG-Within one week, patient will complete SCCAN, with additional goals as deemed appropriate.    Outcome: Met  Note:  Remainder of SCCAN Completed  with a final raw score of 71 indicating mild cognitive deficits.  Goal: STG-Within one week, patient will complete executive function tasks with 80% accuracy given min verbal cues.    Outcome: Not Met  Note: To be addressed.

## 2021-12-22 NOTE — THERAPY
Physical Therapy   Daily Treatment     Patient Name: Maryjane Gray  Age:  56 y.o., Sex:  female  Medical Record #: [id number]  Today's Date: 12/22/2021     Precautions  Precautions: Fall Risk  Comments: R hemiparesis, aspiration precautions    Subjective    Patient agrees to session, reports R ankle soreness following longer distance gait.      Objective       12/22/21 1001   Cognition    Safety Awareness Impaired   Attention Impaired   Gait Functional Level of Assist    Gait Level Of Assist Stand by Assist   Assistive Device None   Distance (Feet) 150   # of Times Distance was Traveled 4   Deviation Decreased Heel Strike;Equinovarus  (increased shuffling on RLE with gait distance)   Stairs Functional Level of Assist   Level of Assist with Stairs Modified Independent   # of Stairs Climbed 16   Stairs Description Supervision for safety;Verbal cueing;Hand rails  (L ascending railing)   Transfer Functional Level of Assist   Bed, Chair, Wheelchair Transfer Stand by Assist   Bed Chair Wheelchair Transfer Description Increased time;Supervision for safety;Verbal cueing   Sitting Lower Body Exercises   Nustep Resistance Level 5  (x 10 minutes with all extremities. RUE ace wrapped to handle)   Bed Mobility    Supine to Sit Supervised   Sit to Supine Supervised   Sit to Stand Supervised   Outcome Measures   Outcome Measures Utilized FGA   FGA (Functional Gait Assessment)   Gait Level Surface 2   Change in Gait Speed 3   Gait with Horizontal Head Turns 1   Gait with Vertical Head Turns 2   Gait and Pivot Turns 3   Step Over Obstacle 3   Gait with Narrow Base of Support 3   Gait with Eyes Closed 1   Ambulating Backwards 2   Steps 2   Functional Gait Score 22   Interdisciplinary Plan of Care Collaboration   IDT Collaboration with  Family / Caregiver;Occupational Therapist   Patient Position at End of Therapy Seated;Call Light within Reach;Tray Table within Reach   Collaboration Comments  present for start of session,  discussed in future would train/educate him to walk with patient througout facility and work on family training aspects.  CLOF with OT   PT Total Time Spent   PT Individual Total Time Spent (Mins) 60   PT Charge Group   PT Therapeutic Exercise 1   PT Neuromuscular Re-Education / Balance 1   PT Therapeutic Activities 2   Car transfer completed w/ SBA.      MMT of R ankle: all 5/5 w/o pain.  No pain reported to R ankle palpation of bony prominences or arch.      Assessment    Patient scores fall risk on FGA, with primary impairments in attention to task for head turn sequencing and path deviation.  More fatigued this session and consistent cues needed throughout session. But completes all interventions asked.  Demos difficulty with RUE grasp on nustep handle, requires constant cues and eventually ace bandage to keep in place.     Strengths: Able to follow instructions,Effective communication skills,Independent prior level of function,Motivated for self care and independence,Manages pain appropriately,Pleasant and cooperative,Supportive family,Willingly participates in therapeutic activities  Barriers: Decreased endurance,Hemiparesis,Home accessibility,Impaired activity tolerance,Impaired balance,Limited mobility,Tube feeding    Plan      Gait with no AD, RLE motor control and coordination, stairs and curb, stroke education, family training for walking in the facility     Passport items to be completed:  Get in/out of bed safely, in/out of a vehicle, safely use mobility device, walk or wheel around home/community, navigate up and down stairs, show how to get up/down from the ground, ensure home is accessible, demonstrate HEP, complete caregiver training       Physical Therapy Problems (Active)     Problem: Balance     Dates: Start: 12/18/21       Goal: STG-Within one week, patient will tolerate outcome measure assessment.     Dates: Start: 12/18/21             Problem: Mobility     Dates: Start: 12/18/21       Goal:  STG-Within one week, patient will ambulate household distance 150 ft with LRAD and CGA.     Dates: Start: 12/18/21          Goal: STG-Within one week, patient will ascend and descend four to six stairs with B rails and CGA.     Dates: Start: 12/18/21             Problem: Mobility Transfers     Dates: Start: 12/18/21       Goal: STG-Within one week, patient will perform bed mobility independently.     Dates: Start: 12/18/21          Goal: STG-Within one week, patient will transfer bed to chair with LRAD and CGA.     Dates: Start: 12/18/21             Problem: PT-Long Term Goals     Dates: Start: 12/18/21       Goal: LTG-By discharge, patient will ambulate >150 ft with LRAD mod I.     Dates: Start: 12/18/21          Goal: LTG-By discharge, patient will transfer one surface to another with LRAD mod I.     Dates: Start: 12/18/21          Goal: LTG-By discharge, patient will ambulate up/down flight of stairs with L rail and supervision.     Dates: Start: 12/18/21          Goal: LTG-By discharge, patient will transfer in/out of a car with LRAD and supervision.     Dates: Start: 12/18/21

## 2021-12-22 NOTE — CARE PLAN
Problem: Mobility Transfers  Goal: STG-Within one week, patient will perform bed mobility independently.  Outcome: Progressing     Problem: Balance  Goal: STG-Within one week, patient will tolerate outcome measure assessment.  Outcome: Met     Problem: Mobility  Goal: STG-Within one week, patient will ambulate household distance 150 ft with LRAD and CGA.  Outcome: Met  Goal: STG-Within one week, patient will ascend and descend four to six stairs with B rails and CGA.  Outcome: Met     Problem: Mobility Transfers  Goal: STG-Within one week, patient will transfer bed to chair with LRAD and CGA.  Outcome: Met

## 2021-12-22 NOTE — CARE PLAN
The patient is Stable - Low risk of patient condition declining or worsening    Shift Goals  Clinical Goals: safety  Patient Goals: coretrak out    Problem: Fall Risk - Rehab  Goal: Patient will remain free from falls  Outcome: Progressing patient is using the call light and has not attempted to self-transfer so far this shift.     Problem: Pain - Standard  Goal: Alleviation of pain or a reduction in pain to the patient’s comfort goal  Outcome: Progressing Patient able to verbalize pain level and verbalize an acceptable level of pain.

## 2021-12-22 NOTE — THERAPY
"Physical Therapy   Daily Treatment     Patient Name: Maryjane Gray  Age:  56 y.o., Sex:  female  Medical Record #: [id number]  Today's Date: 12/21/2021     Precautions  Precautions: Fall Risk  Comments: R hemiparesis, aspiration precautions    Subjective    Pt received in the wheelchair and agreeable to PT.     Objective       12/21/21 1431   Pain   Intervention Declines   Cognition    Level of Consciousness Alert   Sleep/Wake Cycle   Sleep & Rest Awake;Out of bed   Gait Functional Level of Assist    Gait Level Of Assist Stand by Assist  (SBA indoors, occasional CGA outdoors)   Assistive Device None   Distance (Feet) 250  (plus 300ft outdoors)   # of Times Distance was Traveled 2   Deviation Decreased Heel Strike;Decreased Toe Off  (occasional shuffle on R, worse with fatigue)   Stairs Functional Level of Assist   Level of Assist with Stairs Supervised   # of Stairs Climbed 12   Stairs Description Verbal cueing;Supervision for safety;Hand rails;Extra time  (L ascending rail)   Transfer Functional Level of Assist   Bed, Chair, Wheelchair Transfer Stand by Assist   Bed Chair Wheelchair Transfer Description Increased time;Supervision for safety  (mat<>wheelchair)   Bed Mobility    Sit to Stand Stand by Assist   Interdisciplinary Plan of Care Collaboration   Patient Position at End of Therapy Seated;Call Light within Reach;Tray Table within Reach;Phone within Reach   PT Total Time Spent   PT Individual Total Time Spent (Mins) 30   PT Charge Group   PT Gait Training 1   PT Therapeutic Activities 1     Floor transfer: demonstrated with musa technique and then pt performed with close standby assist. Cues for turning to the L side so she has UE support. 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 Pt able to perform a low squat without UE support.    Curb step outside with no UE support and CGA.    Assessment    Pt tolerated therapy session well and " continues to make excellent progress with mobility. Pt's main deficit is decreased clearance on the RLE when ambulating, resulting in occasional shuffle steps that are more pronounced with fatigue.  Strengths: Able to follow instructions,Effective communication skills,Independent prior level of function,Motivated for self care and independence,Manages pain appropriately,Pleasant and cooperative,Supportive family,Willingly participates in therapeutic activities  Barriers: Decreased endurance,Hemiparesis,Home accessibility,Impaired activity tolerance,Impaired balance,Limited mobility,Tube feeding    Plan    Gait with no AD, RLE motor control and coordination, stairs and curb, stroke education, family training for walking in the facility     Passport items to be completed:  Get in/out of bed safely, in/out of a vehicle, safely use mobility device, walk or wheel around home/community, navigate up and down stairs, show how to get up/down from the ground, ensure home is accessible, demonstrate HEP, complete caregiver training    Physical Therapy Problems (Active)     Problem: Balance     Dates: Start: 12/18/21       Goal: STG-Within one week, patient will tolerate outcome measure assessment.     Dates: Start: 12/18/21             Problem: Mobility     Dates: Start: 12/18/21       Goal: STG-Within one week, patient will ambulate household distance 150 ft with LRAD and CGA.     Dates: Start: 12/18/21          Goal: STG-Within one week, patient will ascend and descend four to six stairs with B rails and CGA.     Dates: Start: 12/18/21             Problem: Mobility Transfers     Dates: Start: 12/18/21       Goal: STG-Within one week, patient will perform bed mobility independently.     Dates: Start: 12/18/21          Goal: STG-Within one week, patient will transfer bed to chair with LRAD and CGA.     Dates: Start: 12/18/21             Problem: PT-Long Term Goals     Dates: Start: 12/18/21       Goal: LTG-By discharge, patient  will ambulate >150 ft with LRAD mod I.     Dates: Start: 12/18/21          Goal: LTG-By discharge, patient will transfer one surface to another with LRAD mod I.     Dates: Start: 12/18/21          Goal: LTG-By discharge, patient will ambulate up/down flight of stairs with L rail and supervision.     Dates: Start: 12/18/21          Goal: LTG-By discharge, patient will transfer in/out of a car with LRAD and supervision.     Dates: Start: 12/18/21

## 2021-12-23 PROCEDURE — 700102 HCHG RX REV CODE 250 W/ 637 OVERRIDE(OP): Performed by: PHYSICAL MEDICINE & REHABILITATION

## 2021-12-23 PROCEDURE — 99231 SBSQ HOSP IP/OBS SF/LOW 25: CPT | Performed by: PHYSICAL MEDICINE & REHABILITATION

## 2021-12-23 PROCEDURE — 92507 TX SP LANG VOICE COMM INDIV: CPT

## 2021-12-23 PROCEDURE — 97110 THERAPEUTIC EXERCISES: CPT

## 2021-12-23 PROCEDURE — 97535 SELF CARE MNGMENT TRAINING: CPT

## 2021-12-23 PROCEDURE — 97116 GAIT TRAINING THERAPY: CPT

## 2021-12-23 PROCEDURE — 97530 THERAPEUTIC ACTIVITIES: CPT

## 2021-12-23 PROCEDURE — 97112 NEUROMUSCULAR REEDUCATION: CPT

## 2021-12-23 PROCEDURE — A9270 NON-COVERED ITEM OR SERVICE: HCPCS | Performed by: PHYSICAL MEDICINE & REHABILITATION

## 2021-12-23 PROCEDURE — 770010 HCHG ROOM/CARE - REHAB SEMI PRIVAT*

## 2021-12-23 RX ADMIN — AMLODIPINE BESYLATE 10 MG: 5 TABLET ORAL at 05:32

## 2021-12-23 RX ADMIN — HYDRALAZINE HYDROCHLORIDE 25 MG: 25 TABLET, FILM COATED ORAL at 05:31

## 2021-12-23 RX ADMIN — Medication 1000 UNITS: at 07:56

## 2021-12-23 RX ADMIN — SENNOSIDES AND DOCUSATE SODIUM 2 TABLET: 50; 8.6 TABLET ORAL at 20:59

## 2021-12-23 RX ADMIN — HYDRALAZINE HYDROCHLORIDE 25 MG: 25 TABLET, FILM COATED ORAL at 14:57

## 2021-12-23 RX ADMIN — HYDRALAZINE HYDROCHLORIDE 25 MG: 25 TABLET, FILM COATED ORAL at 20:59

## 2021-12-23 RX ADMIN — LISINOPRIL 15 MG: 5 TABLET ORAL at 17:27

## 2021-12-23 RX ADMIN — LISINOPRIL 15 MG: 5 TABLET ORAL at 05:31

## 2021-12-23 ASSESSMENT — GAIT ASSESSMENTS
DISTANCE (FEET): 350
GAIT LEVEL OF ASSIST: STAND BY ASSIST

## 2021-12-23 ASSESSMENT — ACTIVITIES OF DAILY LIVING (ADL)
BED_CHAIR_WHEELCHAIR_TRANSFER_DESCRIPTION: SUPERVISION FOR SAFETY
TOILET_TRANSFER_DESCRIPTION: OTHER (COMMENT)

## 2021-12-23 ASSESSMENT — PAIN DESCRIPTION - PAIN TYPE: TYPE: ACUTE PAIN

## 2021-12-23 NOTE — PROGRESS NOTES
"Rehab Progress Note     Date of Service: 12/23/2021  Chief Complaint: Follow-up hemorrhagic stroke    Interval Events (Subjective)    Patient seen and examined today in her room.  She has no new complaints today.  She continues to improve with her right-sided weakness and coordination.  we discussed her discharge date which has been set for next week.    ROS: No changes to bowel, bladder, pain, mood, or sleep.        Objective:  VITAL SIGNS: /69   Pulse 72   Temp 36.5 °C (97.7 °F) (Temporal)   Resp 16   Ht 1.626 m (5' 4.02\")   Wt 66 kg (145 lb 8.1 oz)   SpO2 99%   BMI 24.96 kg/m²   Gen: alert, no apparent distress  Neuro: notable for improved right-sided weakness and incoordination, most significant in the hand    Recent Results (from the past 72 hour(s))   CBC WITHOUT DIFFERENTIAL    Collection Time: 12/22/21  5:17 AM   Result Value Ref Range    WBC 8.0 4.8 - 10.8 K/uL    RBC 4.63 4.20 - 5.40 M/uL    Hemoglobin 14.0 12.0 - 16.0 g/dL    Hematocrit 43.3 37.0 - 47.0 %    MCV 93.5 81.4 - 97.8 fL    MCH 30.2 27.0 - 33.0 pg    MCHC 32.3 (L) 33.6 - 35.0 g/dL    RDW 47.5 35.9 - 50.0 fL    Platelet Count 199 164 - 446 K/uL    MPV 11.5 9.0 - 12.9 fL   Basic Metabolic Panel    Collection Time: 12/22/21  5:17 AM   Result Value Ref Range    Sodium 141 135 - 145 mmol/L    Potassium 4.3 3.6 - 5.5 mmol/L    Chloride 105 96 - 112 mmol/L    Co2 26 20 - 33 mmol/L    Glucose 95 65 - 99 mg/dL    Bun 21 8 - 22 mg/dL    Creatinine 0.68 0.50 - 1.40 mg/dL    Calcium 9.2 8.5 - 10.5 mg/dL    Anion Gap 10.0 7.0 - 16.0   ESTIMATED GFR    Collection Time: 12/22/21  5:17 AM   Result Value Ref Range    GFR If African American >60 >60 mL/min/1.73 m 2    GFR If Non African American >60 >60 mL/min/1.73 m 2       Current Facility-Administered Medications   Medication Frequency   • vitamin D3 (cholecalciferol) tablet 1,000 Units DAILY   • hydrALAZINE (APRESOLINE) tablet 25 mg Q8HRS   • amLODIPine (NORVASC) tablet 10 mg Q DAY   • " lisinopril (PRINIVIL) tablet 15 mg TWICE DAILY   • senna-docusate (PERICOLACE or SENOKOT S) 8.6-50 MG per tablet 2 Tablet BID    And   • polyethylene glycol/lytes (MIRALAX) PACKET 1 Packet QDAY PRN    And   • magnesium hydroxide (MILK OF MAGNESIA) suspension 30 mL QDAY PRN    And   • bisacodyl (DULCOLAX) suppository 10 mg QDAY PRN   • Respiratory Therapy Consult Continuous RT   • Pharmacy Consult Request ...Pain Management Review 1 Each PHARMACY TO DOSE   • docusate sodium (ENEMEEZ) enema 283 mg QDAY PRN   • sodium phosphate (Fleet) enema 133 mL QDAY PRN   • artificial tears ophthalmic solution 1 Drop PRN   • benzocaine-menthol (CEPACOL) lozenge 1 Lozenge Q2HRS PRN   • sodium chloride (OCEAN) 0.65 % nasal spray 2 Spray PRN   • midazolam (VERSED) 5 mg/mL (1 mL vial) PRN   • acetaminophen (Tylenol) tablet 650 mg Q4HRS PRN   • hydrOXYzine HCl (ATARAX) tablet 50 mg Q6HRS PRN   • lactulose 20 GM/30ML solution 30 mL QDAY PRN   • mag hydrox-al hydrox-simeth (MAALOX PLUS ES or MYLANTA DS) suspension 20 mL Q2HRS PRN   • melatonin tablet 3 mg HS PRN   • ondansetron (ZOFRAN ODT) dispertab 4 mg 4X/DAY PRN    Or   • ondansetron (ZOFRAN) syringe/vial injection 4 mg 4X/DAY PRN   • traZODone (DESYREL) tablet 50 mg QHS PRN       Orders Placed This Encounter   Procedures   • Diet Order Diet: Regular (meds whole with thin, floated in puree if necessary.  Cut large pills in half.); Tray Modifications (optional): SLP - 1:1 Supervision by Nursing, SLP - Deliver to Nursing Station     Standing Status:   Standing     Number of Occurrences:   1     Order Specific Question:   Diet:     Answer:   Regular [1]     Comments:   meds whole with thin, floated in puree if necessary.  Cut large pills in half.     Order Specific Question:   Tray Modifications (optional)     Answer:   SLP - 1:1 Supervision by Nursing     Order Specific Question:   Tray Modifications (optional)     Answer:   SLP - Deliver to Nursing Station       Assessment:  Active  Hospital Problems    Diagnosis    • *Hemorrhagic stroke (HCC)    • Azotemia    • Transaminitis    • Low grade fever    • Hypernatremia    • Hypokalemia    • Dysphagia    • Other hyperlipidemia    • Vitamin D deficiency    • Impaired mobility and ADLs    • Hyperglycemia    • Leukocytosis    • Primary hypertension      This patient is a 56 y.o. female admitted for acute inpatient rehabilitation with Hemorrhagic stroke (HCC).    I led and attended the weekly conference, and agree with the IDT conference documentation and plan of care as noted below.     Date of conference: 12/22/2021    Goals and barriers: See IDT note.    Biggest barriers: impaired balance, right sided weakness/incoordination, right neglect, impaired memory    CM/social support:  supportive    Anticipated DC date: 12/29    Outpatient OT/PT/SLP    Equip: none needed    Follow up: PCP, stroke bridge clinic, Dr. Murphy        Medical Decision Making and Plan:    Left subcortical MCA hemorrhagic stroke  Right hemiparesis, improved  Right sided incoordination, improved  Right neglect, improved  Cognitive impairment  Dysphagia, resolved  Continue full rehab program  PT/OT/SLP, 1 hr each discipline, 5 days per week  Recreational therapy consult    Statin currently on hold due to elevated liver enzymes  Not on antiplatelets due to hemorrhagic stroke    NG tube removed , diet advanced    Outpatient follow up with Dr. Murphy, stroke bridge clinic, referrals made    Hypertension  Amlodipine  Hydralazine  Lisinopril  Currently with good control    Hyperlipidemia  Statin discontinue due to elevated liver enzymes  Will restart after resolution     Transaminitis, improved  Recheck in 1 week, 12/27     Hypernatremia, resolved  Status post 1/2 normal saline  Appreciate hospitalist assistance     Leukocytosis, resolved  Negative UA and CXR     Hyperglycemia  Likely from tube feeds  SSI  HgbA1c 5.4    Bowel program  Continue bowel medications  Last BM  12/21    Bladder program  Check PVRs - 11, 1  Not retaining  Bladder scan for no voids  ICP for over 400 cc  Scheduled toileting    DVT prophylaxis  Contraindicated given hemorrhage.   Compression stockings on in am, off in pm.  Increase mobility. Monitor for swelling.    Total time:  16 minutes.  I spent greater than 50% of the time for patient care, counseling, and coordination on this date, including patient face-to face time, unit/floor time with review of records/pertinent lab data and studies, as well as discussing diagnostic evaluation/work up, planned therapeutic interventions, and future disposition of care, as per the interval events/subjective and the assessment and plan as noted above.    I have performed a physical exam, reviewed and updated ROS, as well as the assessment and plan today 12/23/2021. In review of note from 12/22/2021 there are no new changes except as documented above.                Vee Valverde M.D.  Physical Medicine and Rehabilitation

## 2021-12-23 NOTE — THERAPY
Occupational Therapy  Daily Treatment     Patient Name: Maryjane Gray  Age:  56 y.o., Sex:  female  Medical Record #: [id number]  Today's Date: 12/23/2021     Precautions  Precautions: (P) Fall Risk  Comments: (P) R hemiparesis, aspiration precautions         Subjective    Pt received up in w/c, agreeable to OT session     Objective       12/23/21 0901   Precautions   Precautions Fall Risk   Comments R hemiparesis, aspiration precautions   Functional Level of Assist   Eating Independent   Grooming Independent;Standing   Grooming Description Standing at sink   Upper Body Dressing Modified Independent   Upper Body Dressing Description Increased time   Lower Body Dressing Modified Independent   Lower Body Dressing Description Increased time   Toileting Independent   Toilet Transfers Independent   Toilet Transfer Description Other (comment)  (no AD or GB required)   Sitting Upper Body Exercises   Sitting Upper Body Exercises Yes   Chest Press 2 sets of 15;Bilateral   Front Arm Raise 2 sets of 15;Bilateral   Side Arm Raise 2 sets of 15;Bilateral   External Shoulder Rotation 2 sets of 15;Bilateral   Bicep Curls 2 sets of 15;Bilateral   Pronation / Supination 2 sets of 15;Bilateral   Wrist Flexion / Extension 2 sets of 15;Bilateral   Other Exercise unweighted ball toss 1x25 reps, min cues for R hand incorporation   Comments therex performed with 2# dumbell with the exception of RUE on side arm raise (no weight). Mirror used for visual feedback for increased symmetry   Hand Strengthening   Comment removed 3 coins and 5 beads from level I theraputty in 7 minutes, able to  beads from table x5 with cues for attention and strategy, unable to  coins but can maintain pinch on coins after sliding to edge of table   Fine Motor / Dexterity    Fine Motor / Dexterity Interventions initiated handwriting practice with R hand, pt initially unable to grasp enough for effective contact wiht paper, applied coban wrap for  increased texture/input and pt with improved pen to paper contact, writes with poor legibility due to FM control, issued paper/pen for practice in room toward increased accuracy   Balance   Comments functional mobility on unit no AD, no R foot drag, intermittent scissoring but no LOB   Interdisciplinary Plan of Care Collaboration   IDT Collaboration with  Certified Nursing Assistant;Nursing   Patient Position at End of Therapy Seated   Collaboration Comments RN/CNA re: cleared to be independent in room   OT Total Time Spent   OT Individual Total Time Spent (Mins) 60   OT Charge Group   OT Self Care / ADL 1   OT Neuromuscular Re-education / Balance 1   OT Therapy Activity 2       Assessment    Pt tolerated session well, demos improvements in safety/balance for basic in-room functional mobility, cleared to be independent in room, poor attention to w/c brakes so w/c removed for safety. RUE making significant improvements daily, able to lift arm against gravity for full ROM though does fatigue quickly. FM control still well below baseline but improved speed and quality of movement noted during theraputty exercises, pt does require consistent cues to maintain attention to FM tasks for improved accuracy.    Strengths: Able to follow instructions,Effective communication skills,Independent prior level of function,Manages pain appropriately,Motivated for self care and independence,Pleasant and cooperative,Supportive family,Willingly participates in therapeutic activities  Barriers: Hemiparesis,Impaired activity tolerance,Impaired balance    Plan    RUE neuro re-ed, FM/dexterity, standing balance/tolerance/safety, IADLs, functional cog       Occupational Therapy Goals (Active)     Problem: Dressing     Dates: Start: 12/18/21       Goal: STG-Within one week, patient will dress UB     Dates: Start: 12/18/21       Goal Note filed on 12/18/21 [id number] by Vanna Rosales, OT     1) Individualized Goal:  at  SUP level with fading  verbal cues PRN.                  Problem: OT Long Term Goals     Dates: Start: 12/18/21       Goal: LTG-By discharge, patient will complete basic self care tasks     Dates: Start: 12/18/21       Goal Note filed on 12/18/21 1422 by Vanna Rosales, OT     1) Individualized Goal:  at a SUP to Mod I level with least restrictive AD and AE PRN.             Goal: LTG-By discharge, patient will perform bathroom transfers     Dates: Start: 12/18/21       Goal Note filed on 12/18/21 1422 by Vanna Rosales, OT     1) Individualized Goal:  at a SUP to Mod I level with least restrictive AD and AE PRN.            Goal: LTG-By discharge, patient will complete basic home management     Dates: Start: 12/18/21       Goal Note filed on 12/18/21 1422 by Vanna Rosales, OT     1) Individualized Goal:  at a Min A to Mod I level with least restrictive AD and AE PRN.

## 2021-12-23 NOTE — DISCHARGE PLANNING
CM called ins co to request fax number to send updated clinicals and inquired about days covered.  RAMEZ spoke with Cindy.  RAMEZ was told that stay has been authorized and no need to send clinicals-patient will be covered at [facility].

## 2021-12-23 NOTE — CARE PLAN
The patient is Stable - Low risk of patient condition declining or worsening      Problem: Fall Risk - Rehab  Goal: Patient will remain free from falls  Note: Pt with good safety awareness, now Mod I in room      Problem: Pain - Standard  Goal: Alleviation of pain or a reduction in pain to the patient’s comfort goal  Note: Pt able to participate in therapies and activities this shift. Denies any pain, pain medication available as needed.

## 2021-12-23 NOTE — THERAPY
Physical Therapy   Daily Treatment     Patient Name: Maryjane Gray  Age:  56 y.o., Sex:  female  Medical Record #: [id number]  Today's Date: 12/23/2021     Precautions  Precautions: Fall Risk  Comments: R hemiparesis, aspiration precautions    Subjective    Pt greeted in chair in room and agreeable to PT session, reporting no pain just that she is fatigued from the day.      Objective       12/23/21 1401   Precautions   Precautions Fall Risk   Vitals   O2 (LPM) 0   O2 Delivery Device None - Room Air   Pain 0 - 10 Group   Therapist Pain Assessment Post Activity Pain Same as Prior to Activity;0   Cognition    Cognition / Consciousness X   Speech/ Communication Expressive Aphasia   Level of Consciousness Alert   Safety Awareness Impaired   Attention Impaired   Gait Functional Level of Assist    Gait Level Of Assist Stand by Assist   Assistive Device None   Distance (Feet) 350   # of Times Distance was Traveled 2   Deviation Decreased Heel Strike;Bradykinetic;Other (Comment)  (decreased RLE foot clearance with gait )   Stairs Functional Level of Assist   Level of Assist with Stairs   (did not trial this session )   Transfer Functional Level of Assist   Bed, Chair, Wheelchair Transfer Stand by Assist   Bed Chair Wheelchair Transfer Description Supervision for safety   Supine Lower Body Exercise   Supine Lower Body Exercises Yes   Bridges Two Legged;2 sets of 10  (with increased WBing through RLE )   Hip Abduction Side Lying;2 sets of 10;Right    Straight Leg Raises 1 set of 10;Bilateral   Comments on mat table    Sitting Lower Body Exercises   Sitting Lower Body Exercises Yes   Bicep Curls 1 set of 10;Bilateral;Weight (See Comments for lbs)  (3lbs with RUE, 5lbs with LUE)   Standing Lower Body Exercises   Standing Lower Body Exercises Yes   Hamstring Curl 1 set of 10;Bilateral    Hip Extension 1 set of 10;Bilateral    Hip Abduction 1 set of 10;Bilateral   Heel Rise 1 set of 10;Bilateral   Mini Squat Partial;1 set of 10    Comments with 2lb ankle weights    Bed Mobility    Supine to Sit Independent   Sit to Supine Independent   Sit to Stand Supervised   Scooting Independent   Rolling Independent   Interdisciplinary Plan of Care Collaboration   IDT Collaboration with  Family / Caregiver;Nursing   Patient Position at End of Therapy Seated;Family / Friend in Room   Collaboration Comments cleared pt to ambulate in hallways with gait belt and family members following practice    PT Total Time Spent   PT Individual Total Time Spent (Mins) 60   PT Charge Group   Charges Yes   PT Gait Training 1   PT Therapeutic Exercise 2   PT Therapeutic Activities 1       Assessment    Pt continuing to practice gait training without A.D. and cues for R foot clearance and she demos slight shuffling on that side due to inattention. Pt practiced R  and RUE coordination with Ladder Toss game x5 trials scoring 0/5 in first 3 trials and 3/5 in final two trials. Pt then engaged in standing balance training with head turns and narrow KAYLEIGH as well as stranding exercises in // bars with ankle weights. Ended session with supine exercises focused on slow, controlled movements and RLE strengthening. Pts daughter showed up near the end of session and practiced gait with pt in the hallway with gait belt and discussed safety, clearing pt to ambulate in the hallways with family, RN notified. Pt reporting she was very fatigued following session and looking forward to a rest.     Strengths: Able to follow instructions,Effective communication skills,Independent prior level of function,Motivated for self care and independence,Manages pain appropriately,Pleasant and cooperative,Supportive family,Willingly participates in therapeutic activities  Barriers: Decreased endurance,Hemiparesis,Home accessibility,Impaired activity tolerance,Impaired balance,Limited mobility,Tube feeding    Plan    Gait with no AD, RLE motor control and coordination, stairs and curb, stroke  education    Passport items to be completed:  Get in/out of bed safely, in/out of a vehicle, safely use mobility device, walk or wheel around home/community, navigate up and down stairs, show how to get up/down from the ground, ensure home is accessible, demonstrate HEP, complete caregiver training    Physical Therapy Problems (Active)     Problem: Mobility Transfers     Dates: Start: 12/18/21       Goal: STG-Within one week, patient will perform bed mobility independently.     Dates: Start: 12/18/21          Goal: STG-Within one week, patient will sit to stand with SPV from all surfaces.      Dates: Start: 12/22/21          Goal: STG-Within one week, patient will transfer bed to chair with SPV and no AD.      Dates: Start: 12/22/21             Problem: PT-Long Term Goals     Dates: Start: 12/18/21       Goal: LTG-By discharge, patient will ambulate >150 ft with LRAD mod I.     Dates: Start: 12/18/21          Goal: LTG-By discharge, patient will transfer one surface to another with LRAD mod I.     Dates: Start: 12/18/21          Goal: LTG-By discharge, patient will ambulate up/down flight of stairs with L rail and supervision.     Dates: Start: 12/18/21          Goal: LTG-By discharge, patient will transfer in/out of a car with LRAD and supervision.     Dates: Start: 12/18/21

## 2021-12-23 NOTE — DISCHARGE PLANNING
CM met with patient and her  to update on IDT and DC date of 12/29/21.  Answered questions.  CM available as needs arise.

## 2021-12-23 NOTE — CARE PLAN
The patient is Stable - Low risk of patient condition declining or worsening    Shift Goals  Clinical Goals: safety  Patient Goals: coretrak out      Problem: Fall Risk - Rehab  Goal: Patient will remain free from falls  Outcome: Progressing: Pt uses call light consistently and appropriately. Waits for assistance does not attempt self transfer this shift. Able to verbalize needs.       Problem: Pain - Standard  Goal: Alleviation of pain or a reduction in pain to the patient’s comfort goal  Outcome: Progressing: Pt declined any pain at bedtime but was informed to ring the call light anytime during the night if she needed any pain medication. Pt stated she understood.

## 2021-12-23 NOTE — THERAPY
Recreational Therapy   Initial Evaluation     Patient Name: Maryjane Gray  Age:  56 y.o., Sex:  female  Medical Record #: [id number]  Today's Date: 12/23/2021     Subjective    Pt reporting that she enjoys shopping with her daughter during her free time.     Objective       12/22/21 1301   Functional Ability Status - Cognitive   Attention Span Remains on Task   Comprehension Follows Two Step Commands   Judgment Able to Make Independent Decisions   Functional Ability Status - Emotional    Affect Flat;Appropriate   Mood Appropriate   Behavior Appropriate   Leisure Competence Measure   Leisure Awareness Independent   Leisure Attitude Cueing Assist   Leisure Skills Minimal Assist   Cultural / Social Behaviors Independent   Interpersonal Skills Independent   Community Integration Skills Minimal Assist   Social Contact Independent   Community Participation Minimal Assist   Interdisciplinary Plan of Care Collaboration   Patient Position at End of Therapy In Bed;Tray Table within Reach;Call Light within Reach   Strengths & Barriers   Strengths Able to follow instructions;Alert and oriented;Motivated for self care and independence;Pleasant and cooperative;Supportive family;Willingly participates in therapeutic activities   Barriers Hemiparesis   Treatment Time   Total Time Spent (mins) 30   Procedural Tracking   Procedural Tracking Community Re-Integration;Community Skills Development;Leisure Skills Awareness;Leisure Skills Development;Gross Motor Functional Leisure Skills;Fine Motor Functional Leisure Skills       Assessment  Patient is 56 y.o. female with a diagnosis of Hemorrhagic stroke .  Additional factors influencing patient status / progress (ie: cognitive factors, co-morbidities, social support, etc): unknown past medical history (patient aphasic and unable to list PMHx);  who presented on 12/14/2021  7:18 AM with acute onset right-sided weakness.  Per documentation, patient originally presented to [facility]  emergency department after abrupt onset right-sided weakness.  Upon presentation to [facility] reportedly patient's blood pressure was > 180 at [facility] CT head was obtained which showed a 3 to 5 cm left subcortical hemorrhage land patient was transferred to [facility] for higher level of care.        Plan  Recommend Recreational Therapy 30 minutes per day 2-3 days per week for 2 weeks for the following treatments:  Community Re-Integration, Community Skills Development, Leisure Skills Awareness, Leisure Skills Development, Gross Motor Functional Leisure Skills and Fine Motor Functional Leisure Skills    Passport items to be completed:  Passport items to be completed:  Verbalize two positive leisure activities, discuss returning to work, hobbies, community groups or volunteer activities, explore community resources     Goals:  Long term and short term goals have been discussed with patient and they are in agreement.    Recreation Therapy Problems (Active)     Problem: Recreation Therapy     Dates: Start: 12/23/21       Goal: STG-Within one week, patient will demonstrate leisure problem solving by sharing on two positive lesiure activities they would like to participate in either in session or during their free time and any perceived barriers to their participation.     Dates: Start: 12/23/21          Goal: STG-Within one week, patient will demonstrate a standing tolerance during a dual task activity with BUE while standing for 2 minutes x3 CGA and no LOB.      Dates: Start: 12/23/21          Goal: LTG-By discharge, patient will demonstrate leisure problem solving by sharing on two positive lesiure activities they would like to participate in post dc and any perceived barriers to their participation.     Dates: Start: 12/23/21          Goal: LTG-By discharge, patient will demonstrate a standing tolerance during a dual task activity with BUE while standing for 3 minutes x3 CGA and no LOB.      Dates:  Start: 12/23/21

## 2021-12-23 NOTE — THERAPY
Speech Language Pathology  Daily Treatment     Patient Name: Maryjane Gray  Age:  56 y.o., Sex:  female  Medical Record #: [id number]  Today's Date: 12/23/2021     Precautions  Precautions: Fall Risk  Comments: R hemiparesis, aspiration precautions    Subjective    Patient was willing to participate.      Objective       12/23/21 1032   Expressive Language   Word Finding Deficits Moderate (3)   SLP Total Time Spent   SLP Individual Total Time Spent (Mins) 60   Treatment Charges   SLP Treatment - Individual Speech Language Treatment - Individual       Assessment    Mod cues for generative naming tasks in concrete categories. Patient also required mod cues for sustained attention tasks.   Able to locate targets during visual scanning tasks with 100% accuracy.     Strengths: Able to follow instructions,Willingly participates in therapeutic activities,Supportive family,Pleasant and cooperative,Independent prior level of function  Barriers: Impaired functional cognition    Plan    Word finding, basic problem solving.       Speech Therapy Problems (Active)     Problem: Expression STGs     Dates: Start: 12/18/21       Goal: STG-Within one week, patient will state 15 items per minute given concrete category and min verbal cues.       Dates: Start: 12/18/21       Goal Note filed on 12/22/21 1251 by Inna Mitchell MS,CCC-SLP     Patient is generating 1 target for given target category, 7/16               Problem: Problem Solving STGs     Dates: Start: 12/18/21       Goal: STG-Within one week, patient will complete executive function tasks with 80% accuracy given min verbal cues.       Dates: Start: 12/18/21       Goal Note filed on 12/22/21 1251 by Inna Mitchell MS,CCC-SLP     To be addressed.               Problem: Social Interaction STGs     Dates: Start: 12/18/21       Goal: STG-Within one week, patient will     Dates: Start: 12/18/21          Goal: STG-Within one week, patient will     Dates: Start: 12/18/21              Problem: Speech/Swallowing LTGs     Dates: Start: 12/18/21       Goal: LTG-By discharge, patient will safely swallow regular textures and thin liquids without aspiration.        Dates: Start: 12/18/21          Goal: LTG-By discharge, patient will solve complex problem Luisito for safe discharge home.     Dates: Start: 12/18/21             Problem: Swallowing STGs     Dates: Start: 12/18/21

## 2021-12-24 PROCEDURE — A9270 NON-COVERED ITEM OR SERVICE: HCPCS | Performed by: PHYSICAL MEDICINE & REHABILITATION

## 2021-12-24 PROCEDURE — 97112 NEUROMUSCULAR REEDUCATION: CPT

## 2021-12-24 PROCEDURE — 97116 GAIT TRAINING THERAPY: CPT

## 2021-12-24 PROCEDURE — 700102 HCHG RX REV CODE 250 W/ 637 OVERRIDE(OP): Performed by: PHYSICAL MEDICINE & REHABILITATION

## 2021-12-24 PROCEDURE — 97530 THERAPEUTIC ACTIVITIES: CPT | Mod: CO

## 2021-12-24 PROCEDURE — 770010 HCHG ROOM/CARE - REHAB SEMI PRIVAT*

## 2021-12-24 PROCEDURE — 97110 THERAPEUTIC EXERCISES: CPT

## 2021-12-24 RX ADMIN — LISINOPRIL 15 MG: 5 TABLET ORAL at 17:52

## 2021-12-24 RX ADMIN — AMLODIPINE BESYLATE 10 MG: 5 TABLET ORAL at 05:42

## 2021-12-24 RX ADMIN — LISINOPRIL 15 MG: 5 TABLET ORAL at 05:42

## 2021-12-24 RX ADMIN — SENNOSIDES AND DOCUSATE SODIUM 2 TABLET: 50; 8.6 TABLET ORAL at 21:34

## 2021-12-24 RX ADMIN — HYDRALAZINE HYDROCHLORIDE 25 MG: 25 TABLET, FILM COATED ORAL at 14:44

## 2021-12-24 RX ADMIN — HYDRALAZINE HYDROCHLORIDE 25 MG: 25 TABLET, FILM COATED ORAL at 21:34

## 2021-12-24 RX ADMIN — HYDRALAZINE HYDROCHLORIDE 25 MG: 25 TABLET, FILM COATED ORAL at 05:42

## 2021-12-24 RX ADMIN — Medication 1000 UNITS: at 08:11

## 2021-12-24 ASSESSMENT — GAIT ASSESSMENTS
DEVIATION: DECREASED BASE OF SUPPORT;DECREASED HEEL STRIKE
GAIT LEVEL OF ASSIST: STAND BY ASSIST
DEVIATION: BRADYKINETIC;DECREASED HEEL STRIKE
GAIT LEVEL OF ASSIST: SUPERVISED
DISTANCE (FEET): 200

## 2021-12-24 ASSESSMENT — ACTIVITIES OF DAILY LIVING (ADL)
BED_CHAIR_WHEELCHAIR_TRANSFER_DESCRIPTION: OTHER (COMMENT)
BED_CHAIR_WHEELCHAIR_TRANSFER_DESCRIPTION: INCREASED TIME

## 2021-12-24 ASSESSMENT — 6 MINUTE WALK TEST (6MWT)
GAIT SPEED - METERS PER SECOND: 1.1
NUMBER OF RESTS: 0
TOTAL DISTANCE WALKED (FT): 1302
LEVEL OF ASSISTANCE: SUPERVISION

## 2021-12-24 ASSESSMENT — PAIN DESCRIPTION - PAIN TYPE: TYPE: ACUTE PAIN

## 2021-12-24 NOTE — THERAPY
Physical Therapy   Daily Treatment     Patient Name: Maryjane Gray  Age:  56 y.o., Sex:  female  Medical Record #: [id number]  Today's Date: 12/24/2021     Precautions  Precautions: Fall Risk  Comments: R hemiparesis, aspiration precautions    Subjective    Pt agreed to therapy     Objective       12/24/21 0901   Gait Functional Level of Assist    Gait Level Of Assist Stand by Assist   Assistive Device None   Distance (Feet) 200   # of Times Distance was Traveled 2   Deviation Decreased Base Of Support;Decreased Heel Strike  (Dec R foot clearance/attention)   Transfer Functional Level of Assist   Bed, Chair, Wheelchair Transfer Modified Independent   Bed Chair Wheelchair Transfer Description Other (comment)  (stand step transfer no AD)   Sitting Lower Body Exercises   Sitting Lower Body Exercises Yes   Nustep Resistance Level 4;Time (See Comments)  (8' B LE and R UE only, vc for R hand grasp on handle)   Neuro-Muscular Treatments   Neuro-Muscular Treatments Weight Shift Right;Weight Shift Left;Postural Changes;Compensatory Strategies   Comments dynamic standing balance without UE support balloon volley on air ex pad returning balloon with R UE 2 sets x 20 reps. B LE coordination activity toe taps to various colored cones unilateral taps and alternating LE taps focusing on R LE motor control and follwong multi step commands   Interdisciplinary Plan of Care Collaboration   Patient Position at End of Therapy Other (Comments)  (Luisito in room , no AD)   PT Total Time Spent   PT Individual Total Time Spent (Mins) 30   PT Charge Group   PT Therapeutic Exercise 1   PT Neuromuscular Re-Education / Balance 1   Supervising Physical Therapist Darrick Hurtado       Assessment    The patient tolerated the session well with focus on progression of LE coordination and motor control. Pt demos improving stability in SLS and was able to maintain balance on air ex pad with the balloon volley activity  Strengths: Able to follow  instructions,Effective communication skills,Independent prior level of function,Motivated for self care and independence,Manages pain appropriately,Pleasant and cooperative,Supportive family,Willingly participates in therapeutic activities  Barriers: Decreased endurance,Hemiparesis,Home accessibility,Impaired activity tolerance,Impaired balance,Limited mobility,Tube feeding    Plan    Gait with no AD, RLE motor control and coordination, stairs and curb, stroke education     Passport items to be completed:  Get in/out of bed safely, in/out of a vehicle, safely use mobility device, walk or wheel around home/community, navigate up and down stairs, show how to get up/down from the ground, ensure home is accessible, demonstrate HEP, complete caregiver training    Physical Therapy Problems (Active)     Problem: Mobility Transfers     Dates: Start: 12/18/21       Goal: STG-Within one week, patient will perform bed mobility independently.     Dates: Start: 12/18/21          Goal: STG-Within one week, patient will sit to stand with SPV from all surfaces.      Dates: Start: 12/22/21          Goal: STG-Within one week, patient will transfer bed to chair with SPV and no AD.      Dates: Start: 12/22/21             Problem: PT-Long Term Goals     Dates: Start: 12/18/21       Goal: LTG-By discharge, patient will ambulate >150 ft with LRAD mod I.     Dates: Start: 12/18/21          Goal: LTG-By discharge, patient will transfer one surface to another with LRAD mod I.     Dates: Start: 12/18/21          Goal: LTG-By discharge, patient will ambulate up/down flight of stairs with L rail and supervision.     Dates: Start: 12/18/21          Goal: LTG-By discharge, patient will transfer in/out of a car with LRAD and supervision.     Dates: Start: 12/18/21

## 2021-12-24 NOTE — PROGRESS NOTES
Patient care assumed. Report received from [facility] ROSALIND Mcpherson. Patient is alert and calm, resting in bed. Call light and bedside table within reach. Will continue to monitor.

## 2021-12-24 NOTE — CARE PLAN
The patient is Watcher - Medium risk of patient condition declining or worsening    Shift Goals  Clinical Goals: safety      Problem: Fall Risk - Rehab  Goal: Patient will remain free from falls  Note: Patient is a no fall risk, patient is currently Mod I in her room, advised patient to call nursing staff for any help.     Problem: Pain - Standard  Goal: Alleviation of pain or a reduction in pain to the patient’s comfort goal  Note: Patient has no complaints of pain, is able to verbalize pain level and verbalize an acceptable level of pain.

## 2021-12-24 NOTE — CARE PLAN
The patient is Stable - Low risk of patient condition declining or worsening    Shift Goals  Clinical Goals: safety  Patient Goals: coretrak out      Problem: Fall Risk - Rehab  Goal: Patient will remain free from falls  Outcome: Progressing: Pt is MOD I in her room, pt informed to ring the call light if she needs anything throughout the night or feels unsafe to ambulate to the bathroom. Pt stated she understood.      Problem: Pain - Standard  Goal: Alleviation of pain or a reduction in pain to the patient’s comfort goal  Outcome: Progressing:Pt declined any pain at bedtime but was informed to ring the call light anytime during the night if she needed any pain medication. Pt stated she understood.

## 2021-12-24 NOTE — THERAPY
Recreational Therapy  Daily Treatment     Patient Name: Maryjane Gray  AGE:  56 y.o., SEX:  female  Medical Record #: [id number]  Today's Date: 12/23/2021       Subjective    Pt bright and social. At times required repetition of words due to being unintelligible.      Objective       12/23/21 1501   Functional Ability Status - Cognitive   Attention Span Remains on Task   Comprehension Follows Two Step Commands;Requires Cueing   Judgment Able to Make Independent Decisions;Needs Assistance   Cognitive Comments Min verbal cues to task completion and problem solving.    Functional Ability Status - Emotional    Affect Appropriate   Mood Appropriate   Behavior Appropriate   Skilled Intervention    Skilled Intervention Fine Motor Leisure   Skilled Intervention Comments FM rubber band stretchign matching a template   Interdisciplinary Plan of Care Collaboration   IDT Collaboration with  Family / Caregiver   Patient Position at End of Therapy Family / Friend in Room;Tray Table within Reach;Call Light within Reach;Other (Comments)  (Family cleared to walk with patient. )   Collaboration Comments Daughter present   Strengths & Barriers   Strengths Able to follow instructions;Alert and oriented;Supportive family;Pleasant and cooperative;Motivated for self care and independence;Willingly participates in therapeutic activities   Barriers Fatigue;Hemiparesis;Decreased endurance   Treatment Time   Total Time Spent (mins) 30   Procedural Tracking   Procedural Tracking Community Re-Integration;Community Skills Development;Leisure Skills Awareness;Leisure Skills Development;Fine Motor Functional Leisure Skills       Assessment    Pt required Min verbal cues to use both hands in the activity involving stretching the rubber bands over pegs matching a provided template.     Strengths: (P) Able to follow instructions,Alert and oriented,Supportive family,Pleasant and cooperative,Motivated for self care and independence,Willingly  participates in therapeutic activities  Barriers: (P) Fatigue,Hemiparesis,Decreased endurance    Plan    RUE FM    Passport items to be completed:  Passport items to be completed:  Verbalize two positive leisure activities, discuss returning to work, hobbies, community groups or volunteer activities, explore community resources

## 2021-12-24 NOTE — THERAPY
"Occupational Therapy  Daily Treatment     Patient Name: Maryjane Gray  Age:  56 y.o., Sex:  female  Medical Record #: [id number]  Today's Date: 12/24/2021     Precautions  Precautions: (P) Fall Risk  Comments: (P) R hemiparesis, aspiration precautions         Subjective    \" I had a shower yesterday morning.\"     Objective /Assessment       12/24/21 0702   Precautions   Precautions Fall Risk   Comments R hemiparesis, aspiration precautions   Sitting Upper Body Exercises   Upper Extremity Bike Level 3 Resistance  (x 5 minutes  hydrocycle )   Fine Motor / Dexterity    Fine Motor / Dexterity Interventions seated at table performed the following FMC tasks.    individual finger to thumb pinch for 1 inch block placement ,   picking up  a block dropping in to palm then set down onto table top  via index finger and thumb. individual finger to thumb pinch  with dice placement  worked  picking up all thedice at once  and then setting on table top via  index finger and  thumb   unable to get to tip of index finger when setting  down.     [place] manual dexterity    with focus on  turning over.    difficulty with  in hand manipulation for  disc turning.    Interdisciplinary Plan of Care Collaboration   Patient Position at End of Therapy Seated;Call Light within Reach;Tray Table within Reach  (eating breakfast )   OT Total Time Spent   OT Individual Total Time Spent (Mins) 60   OT Charge Group   OT Therapy Activity 4      PVC pipe tree for bilateral hand task. Performed in standing  X 8 minutes     Required  Min cues for problem solving.     Continues to make slow gains with improving  Right FMC        Strengths: Able to follow instructions,Effective communication skills,Independent prior level of function,Manages pain appropriately,Motivated for self care and independence,Pleasant and cooperative,Supportive family,Willingly participates in therapeutic activities  Barriers: Hemiparesis,Impaired activity tolerance,Impaired " balance    Plan    RUE neuro re-ed, FM/dexterity, standing balance/tolerance/safety, IADLs, functional cog       Occupational Therapy Goals (Active)     Problem: Dressing     Dates: Start: 12/18/21       Goal: STG-Within one week, patient will dress UB     Dates: Start: 12/18/21       Goal Note filed on 12/18/21 1422 by Vanna Rosales, OT     1) Individualized Goal:  at  SUP level with fading verbal cues PRN.                  Problem: OT Long Term Goals     Dates: Start: 12/18/21       Goal: LTG-By discharge, patient will complete basic self care tasks     Dates: Start: 12/18/21       Goal Note filed on 12/18/21 1422 by Vanna Rosales, OT     1) Individualized Goal:  at a SUP to Mod I level with least restrictive AD and AE PRN.             Goal: LTG-By discharge, patient will perform bathroom transfers     Dates: Start: 12/18/21       Goal Note filed on 12/18/21 1422 by Vanna Rosales, OT     1) Individualized Goal:  at a SUP to Mod I level with least restrictive AD and AE PRN.            Goal: LTG-By discharge, patient will complete basic home management     Dates: Start: 12/18/21       Goal Note filed on 12/18/21 1422 by Vanna Rosales, OT     1) Individualized Goal:  at a Min A to Mod I level with least restrictive AD and AE PRN.

## 2021-12-25 PROCEDURE — 770010 HCHG ROOM/CARE - REHAB SEMI PRIVAT*

## 2021-12-25 PROCEDURE — A9270 NON-COVERED ITEM OR SERVICE: HCPCS | Performed by: PHYSICAL MEDICINE & REHABILITATION

## 2021-12-25 PROCEDURE — 99231 SBSQ HOSP IP/OBS SF/LOW 25: CPT | Performed by: PHYSICAL MEDICINE & REHABILITATION

## 2021-12-25 PROCEDURE — 700102 HCHG RX REV CODE 250 W/ 637 OVERRIDE(OP): Performed by: PHYSICAL MEDICINE & REHABILITATION

## 2021-12-25 RX ADMIN — LISINOPRIL 15 MG: 5 TABLET ORAL at 05:18

## 2021-12-25 RX ADMIN — HYDRALAZINE HYDROCHLORIDE 25 MG: 25 TABLET, FILM COATED ORAL at 15:30

## 2021-12-25 RX ADMIN — LISINOPRIL 15 MG: 5 TABLET ORAL at 17:27

## 2021-12-25 RX ADMIN — HYDRALAZINE HYDROCHLORIDE 25 MG: 25 TABLET, FILM COATED ORAL at 20:34

## 2021-12-25 RX ADMIN — Medication 1000 UNITS: at 08:02

## 2021-12-25 RX ADMIN — HYDRALAZINE HYDROCHLORIDE 25 MG: 25 TABLET, FILM COATED ORAL at 05:17

## 2021-12-25 RX ADMIN — AMLODIPINE BESYLATE 10 MG: 5 TABLET ORAL at 05:17

## 2021-12-25 NOTE — PROGRESS NOTES
"Rehab Progress Note     Date of Service: 12/25/2021  Chief Complaint: Follow-up hemorrhagic stroke    Interval Events (Subjective)    Patient seen and examined today in her room.  Her daughter is present.  Both agree that the patient has made excellent functional gains.  Patient has no new complaints today.    ROS: No changes to bowel, bladder, pain, mood, or sleep.        Objective:  VITAL SIGNS: /67   Pulse (!) 57   Temp 36.3 °C (97.3 °F) (Temporal)   Resp 18   Ht 1.626 m (5' 4.02\")   Wt 66 kg (145 lb 8.1 oz)   SpO2 96%   BMI 24.96 kg/m²   Gen: alert, no apparent distress  Neuro: notable for mild right-sided incoordination, weakness, facial droop    No results found for this or any previous visit (from the past 72 hour(s)).    Current Facility-Administered Medications   Medication Frequency   • vitamin D3 (cholecalciferol) tablet 1,000 Units DAILY   • hydrALAZINE (APRESOLINE) tablet 25 mg Q8HRS   • amLODIPine (NORVASC) tablet 10 mg Q DAY   • lisinopril (PRINIVIL) tablet 15 mg TWICE DAILY   • senna-docusate (PERICOLACE or SENOKOT S) 8.6-50 MG per tablet 2 Tablet BID    And   • polyethylene glycol/lytes (MIRALAX) PACKET 1 Packet QDAY PRN    And   • magnesium hydroxide (MILK OF MAGNESIA) suspension 30 mL QDAY PRN    And   • bisacodyl (DULCOLAX) suppository 10 mg QDAY PRN   • Respiratory Therapy Consult Continuous RT   • Pharmacy Consult Request ...Pain Management Review 1 Each PHARMACY TO DOSE   • docusate sodium (ENEMEEZ) enema 283 mg QDAY PRN   • sodium phosphate (Fleet) enema 133 mL QDAY PRN   • artificial tears ophthalmic solution 1 Drop PRN   • benzocaine-menthol (CEPACOL) lozenge 1 Lozenge Q2HRS PRN   • sodium chloride (OCEAN) 0.65 % nasal spray 2 Spray PRN   • midazolam (VERSED) 5 mg/mL (1 mL vial) PRN   • acetaminophen (Tylenol) tablet 650 mg Q4HRS PRN   • hydrOXYzine HCl (ATARAX) tablet 50 mg Q6HRS PRN   • lactulose 20 GM/30ML solution 30 mL QDAY PRN   • mag hydrox-al hydrox-simeth (MAALOX PLUS " ES or MYLANTA DS) suspension 20 mL Q2HRS PRN   • melatonin tablet 3 mg HS PRN   • ondansetron (ZOFRAN ODT) dispertab 4 mg 4X/DAY PRN    Or   • ondansetron (ZOFRAN) syringe/vial injection 4 mg 4X/DAY PRN   • traZODone (DESYREL) tablet 50 mg QHS PRN       Orders Placed This Encounter   Procedures   • Diet Order Diet: Regular (meds whole with thin, floated in puree if necessary.  Cut large pills in half.); Tray Modifications (optional): SLP - 1:1 Supervision by Nursing, SLP - Deliver to Nursing Station     Standing Status:   Standing     Number of Occurrences:   1     Order Specific Question:   Diet:     Answer:   Regular [1]     Comments:   meds whole with thin, floated in puree if necessary.  Cut large pills in half.     Order Specific Question:   Tray Modifications (optional)     Answer:   SLP - 1:1 Supervision by Nursing     Order Specific Question:   Tray Modifications (optional)     Answer:   SLP - Deliver to Nursing Station       Assessment:  Active Hospital Problems    Diagnosis    • *Hemorrhagic stroke (HCC)    • Azotemia    • Transaminitis    • Low grade fever    • Hypernatremia    • Hypokalemia    • Dysphagia    • Other hyperlipidemia    • Vitamin D deficiency    • Impaired mobility and ADLs    • Hyperglycemia    • Leukocytosis    • Primary hypertension      This patient is a 56 y.o. female admitted for acute inpatient rehabilitation with Hemorrhagic stroke (HCC).    I led and attended the weekly conference, and agree with the IDT conference documentation and plan of care as noted below.     Date of conference: 12/22/2021    Goals and barriers: See IDT note.    Biggest barriers: impaired balance, right sided weakness/incoordination, right neglect, impaired memory    CM/social support:  supportive    Anticipated DC date: 12/29    Outpatient OT/PT/SLP    Equip: none needed    Follow up: PCP, stroke bridge clinic, Dr. Murphy        Medical Decision Making and Plan:    Left subcortical MCA hemorrhagic  stroke  Right hemiparesis, improved  Right sided incoordination, improved  Right neglect, improved  Cognitive impairment  Dysphagia, resolved  Continue full rehab program  PT/OT/SLP, 1 hr each discipline, 5 days per week  Recreational therapy consult    Statin currently on hold due to elevated liver enzymes  Not on antiplatelets due to hemorrhagic stroke    NG tube removed , diet advanced    Outpatient follow up with Dr. Murphy, stroke bridge clinic, referrals made    Hypertension  Amlodipine  Hydralazine  Lisinopril  Currently with good control    Hyperlipidemia  Statin discontinue due to elevated liver enzymes  Will restart after resolution     Transaminitis, improved  Recheck in 1 week, 12/27     Hypernatremia, resolved  Status post 1/2 normal saline  Appreciate hospitalist assistance     Leukocytosis, resolved  Negative UA and CXR     Hyperglycemia  Likely from tube feeds  SSI  HgbA1c 5.4    Bowel program  Continue bowel medications  Last BM 12/24    Bladder program  Check PVRs - 11, 1  Not retaining  Bladder scan for no voids  ICP for over 400 cc  Scheduled toileting    DVT prophylaxis  Contraindicated given hemorrhage.   Compression stockings on in am, off in pm.  Increase mobility. Monitor for swelling.    Total time:  15 minutes.  I spent greater than 50% of the time for patient care, counseling, and coordination on this date, including patient face-to face time, unit/floor time with review of records/pertinent lab data and studies, as well as discussing diagnostic evaluation/work up, planned therapeutic interventions, and future disposition of care, as per the interval events/subjective and the assessment and plan as noted above.        I have performed a physical exam, reviewed and updated ROS, as well as the assessment and plan today 12/25/2021. In review of note from 12/23/2021. there are no new changes except as documented above.              Vee Valverde M.D.  Physical Medicine and  Rehabilitation

## 2021-12-25 NOTE — THERAPY
Physical Therapy   Daily Treatment     Patient Name: Maryjane Gray  Age:  56 y.o., Sex:  female  Medical Record #: [id number]  Today's Date: 12/24/2021     Precautions  Precautions: Other (See Comments)  Comments: IND amb trial in room, R musa, aspiration precautions    Subjective    Patient agreeable to PT.     Objective       12/24/21 1401   Precautions   Precautions Other (See Comments)   Comments IND amb trial in room, R musa, aspiration precautions   Vitals   O2 (LPM) 0   O2 Delivery Device None - Room Air   Gait Functional Level of Assist    Gait Level Of Assist Supervised   Assistive Device None   Distance (Feet)   (0.50 miles on first rep; 1302 ft on next rep for 6MWT)   # of Times Distance was Traveled   (per above and 4 shorter distances)   Deviation Bradykinetic;Decreased Heel Strike  (impaired R foot clearance during MidSw and TSw; path finding)   Wheelchair Functional Level of Assist   Wheelchair Assist   (n/a)   Distance Wheelchair (Feet or Distance)   (n/a)   Wheelchair Description   (n/a)   Stairs Functional Level of Assist   Level of Assist with Stairs Modified Independent   # of Stairs Climbed 35  (mixed of standard & adaptive heights)   Stairs Description Extra time  (only 1 railing used, usually L; reciprocal pattern)   Transfer Functional Level of Assist   Bed, Chair, Wheelchair Transfer Modified Independent   Bed Chair Wheelchair Transfer Description Increased time   Sitting Lower Body Exercises   Nustep Resistance Level 4  (10 min, no breaks, decreased LUE use, avg 53 spm)   Standing Lower Body Exercises   Comments Object from floor for 4 reps at SPV-IND level without AD.   Bed Mobility    Sit to Stand Modified Independent   Neuro-Muscular Treatments   Comments Wii Fit Plus balance with focus on coordination and weight shifting; difficulty with increased anterior wt shift, then Left weight shift; 4 sets of 8 min reps; unable to perform 2-step directions; max VCs to decrease shoulder  "compensations and focus on increased hip and ankle strategies; SBA without BUE support.  Also, 1 set of 20 toe tocuhes on 6\" ball at CGA-SBA level with gait belt, no LOB.   Outcome Measures   Outcome Measures Utilized 6 Minute Walk Test   6 Minute Walk Test   Distance (feet) 1302   Gait Speed (meters per second) 1.1   Number of Rests 0   Level of Assistance 5   PT Total Time Spent   PT Individual Total Time Spent (Mins) 90   PT Charge Group   PT Gait Training 2   PT Therapeutic Exercise 1   PT Neuromuscular Re-Education / Balance 3       Assessment    Patient has improving activity tolerance, even with mild fatigue present prior to session; impaired dual tasking and 2-step processes; great motivation; mildly impaired safety and problem solving; good social support.    Strengths: Able to follow instructions,Effective communication skills,Independent prior level of function,Motivated for self care and independence,Manages pain appropriately,Pleasant and cooperative,Supportive family,Willingly participates in therapeutic activities  Barriers: Decreased endurance,Hemiparesis,Home accessibility,Impaired activity tolerance,Impaired balance,Limited mobility,Tube feeding    Plan    Gait without AD, weight shifting ankle/hip strategies, dual tasking, education, patient passport; pt scheduled to d/c home on Wednesday 12/29/21.    Passport items to be completed:  Passport items to be completed:  Get in/out of bed safely, in/out of a vehicle, safely use mobility device, walk or wheel around home/community, navigate up and down stairs, show how to get up/down from the ground, ensure home is accessible, demonstrate HEP, complete caregiver training    Physical Therapy Problems (Active)     Problem: Mobility Transfers     Dates: Start: 12/18/21       Goal: STG-Within one week, patient will perform bed mobility independently.     Dates: Start: 12/18/21          Goal: STG-Within one week, patient will sit to stand with SPV from all " surfaces.      Dates: Start: 12/22/21          Goal: STG-Within one week, patient will transfer bed to chair with SPV and no AD.      Dates: Start: 12/22/21             Problem: PT-Long Term Goals     Dates: Start: 12/18/21       Goal: LTG-By discharge, patient will ambulate >150 ft with LRAD mod I.     Dates: Start: 12/18/21          Goal: LTG-By discharge, patient will transfer one surface to another with LRAD mod I.     Dates: Start: 12/18/21          Goal: LTG-By discharge, patient will ambulate up/down flight of stairs with L rail and supervision.     Dates: Start: 12/18/21          Goal: LTG-By discharge, patient will transfer in/out of a car with LRAD and supervision.     Dates: Start: 12/18/21

## 2021-12-25 NOTE — THERAPY
"Recreational Therapy  Daily Treatment     Patient Name: Maryjane Gray  AGE:  56 y.o., SEX:  female  Medical Record #: [id number]  Today's Date: 12/25/2021       Subjective    Initially required light encouragement for participation however easily redirected. Daughter encouraging of participation.      Objective       12/25/21 1031   Functional Ability Status - Cognitive   Attention Span Remains on Task   Comprehension Follows Two Step Commands   Judgment Able to Make Independent Decisions;Impaired   Cognitive Comments Min to Mod cues for cognitive leisure task completion.    Functional Ability Status - Emotional    Affect Appropriate;Bright   Mood Appropriate   Behavior Appropriate   Skilled Intervention    Skilled Intervention Cognitive Leisure   Skilled Intervention Comments \"Word A Round\" cogntiive leisure activity   Interdisciplinary Plan of Care Collaboration   IDT Collaboration with  Family / Caregiver   Patient Position at End of Therapy Seated;Family / Friend in Room;Tray Table within Reach;Call Light within Reach   Collaboration Comments Daughter present   Strengths & Barriers   Strengths Able to follow instructions;Alert and oriented;Manages pain appropriately;Motivated for self care and independence;Pleasant and cooperative;Supportive family;Willingly participates in therapeutic activities   Barriers Fatigue;Aphasia expressive   Treatment Time   Total Time Spent (mins) 30   Procedural Tracking   Procedural Tracking Community Re-Integration;Community Skills Development;Leisure Skills Awareness;Leisure Skills Development;Cognitive Skills Training;Fine Motor Functional Leisure Skills       Assessment    Pt was given a \"Word A Round\" cognitive leisure activity. Pt was to find a word that was in a [place] between 5 and 9 letters. The letters were in order however were in a [place] going either clockwise or counter clockwise. Extra time and Min to Mod cues for task completion. Pt would at times say a wrong letter " when spelling a word during the task. Pt was given paper to write the words out at 20% of the time would misspell and/or mispronounce the word.     Strengths: (P) Able to follow instructions,Alert and oriented,Manages pain appropriately,Motivated for self care and independence,Pleasant and cooperative,Supportive family,Willingly participates in therapeutic activities  Barriers: (P) Fatigue,Aphasia expressive    Plan    Cognitive leisure.     Passport items to be completed:  Passport items to be completed:  Verbalize two positive leisure activities, discuss returning to work, hobbies, community groups or volunteer activities, explore community resources

## 2021-12-25 NOTE — CARE PLAN
The patient is Stable - Low risk of patient condition declining or worsening    Shift Goals  Clinical Goals: safety  Patient Goals: coretrak out      Problem: Fall Risk - Rehab  Goal: Patient will remain free from falls  Outcome: Progressing: pt is MOD I in her room but encouraged to use the call light anytime she needs anything or feels unsafe to ambulate herself. Pt stated she understood.      Problem: Pain - Standard  Goal: Alleviation of pain or a reduction in pain to the patient’s comfort goal  Outcome: Progressing:Pt declined any pain at bedtime but was informed to ring the call light anytime during the night if she needed any pain medication. Pt stated she understood.

## 2021-12-26 PROCEDURE — 700102 HCHG RX REV CODE 250 W/ 637 OVERRIDE(OP): Performed by: PHYSICAL MEDICINE & REHABILITATION

## 2021-12-26 PROCEDURE — 770010 HCHG ROOM/CARE - REHAB SEMI PRIVAT*

## 2021-12-26 PROCEDURE — 97110 THERAPEUTIC EXERCISES: CPT

## 2021-12-26 PROCEDURE — A9270 NON-COVERED ITEM OR SERVICE: HCPCS | Performed by: PHYSICAL MEDICINE & REHABILITATION

## 2021-12-26 RX ADMIN — LISINOPRIL 15 MG: 5 TABLET ORAL at 17:20

## 2021-12-26 RX ADMIN — HYDRALAZINE HYDROCHLORIDE 25 MG: 25 TABLET, FILM COATED ORAL at 21:35

## 2021-12-26 RX ADMIN — Medication 1000 UNITS: at 07:48

## 2021-12-26 RX ADMIN — HYDRALAZINE HYDROCHLORIDE 25 MG: 25 TABLET, FILM COATED ORAL at 05:35

## 2021-12-26 RX ADMIN — HYDRALAZINE HYDROCHLORIDE 25 MG: 25 TABLET, FILM COATED ORAL at 14:04

## 2021-12-26 RX ADMIN — LISINOPRIL 15 MG: 5 TABLET ORAL at 05:35

## 2021-12-26 RX ADMIN — AMLODIPINE BESYLATE 10 MG: 5 TABLET ORAL at 05:35

## 2021-12-26 ASSESSMENT — GAIT ASSESSMENTS
GAIT LEVEL OF ASSIST: SUPERVISED
DEVIATION: BRADYKINETIC;DECREASED HEEL STRIKE

## 2021-12-26 NOTE — CARE PLAN
The patient is Watcher - Medium risk of patient condition declining or worsening    Shift Goals  Clinical Goals: safety      Problem: Pain - Standard  Goal: Alleviation of pain or a reduction in pain to the patient’s comfort goal  Note: Patient has no complaints of pain, is able to verbalize pain level and verbalize an acceptable level of pain.       Problem: Fall Risk - Rehab  Goal: Patient will remain free from falls  Note: Patient is MOD-I, is cleared to walk with family member.

## 2021-12-26 NOTE — THERAPY
Physical Therapy   Daily Treatment     Patient Name: Maryjane Gray  Age:  56 y.o., Sex:  female  Medical Record #: [id number]  Today's Date: 12/26/2021     Precautions  Precautions: Other (See Comments)  Comments: IND amb trial in room, R musa, aspiration precautions    Subjective    Pt agreeable to PT session. When given choice between balance or strengthening, pt chose strengthening.     Objective       12/26/21 1101   Pain   Intervention Declines   Cognition    Level of Consciousness Alert   Sleep/Wake Cycle   Sleep & Rest Awake;Out of bed   Gait Functional Level of Assist    Gait Level Of Assist Supervised   Assistive Device None   Distance (Feet)   (room<>main gym)   Deviation Bradykinetic;Decreased Heel Strike  (impaired foor clearance on R)   Sitting Lower Body Exercises   Nustep Resistance Level 4;Resistance Level 5  (lvl 5 x5 min then lvl 4 x5 min, BLE only)   Comments Focus of Nustep on BLE strengthening and endurance without UE assistance. 315 steps on level 5 and 295 on level 4 with rest break in between.   Bed Mobility    Sit to Stand Independent   Interdisciplinary Plan of Care Collaboration   IDT Collaboration with  Family / Caregiver   Patient Position at End of Therapy Seated;Family / Friend in Room;Call Light within Reach;Tray Table within Reach;Phone within Reach   Collaboration Comments Daughter present during session.   PT Total Time Spent   PT Individual Total Time Spent (Mins) 30   PT Charge Group   PT Therapeutic Exercise 2       Assessment    Pt tolerated therapy session well with focus on BLE strengthening and endurance. Pt was able to walk to and from the gym as well with supervision and no cues for path finding.  Strengths: Able to follow instructions,Effective communication skills,Independent prior level of function,Motivated for self care and independence,Manages pain appropriately,Pleasant and cooperative,Supportive family,Willingly participates in therapeutic activities  Barriers:  Decreased endurance,Hemiparesis,Home accessibility,Impaired activity tolerance,Impaired balance,Limited mobility,Tube feeding    Plan    Gait without AD, weight shifting ankle/hip strategies, dual tasking, education, patient passport; pt scheduled to d/c home on Wednesday 12/29/21.     Passport items to be completed:  Passport items to be completed:  Get in/out of bed safely, in/out of a vehicle, safely use mobility device, walk or wheel around home/community, navigate up and down stairs, show how to get up/down from the ground, ensure home is accessible, demonstrate HEP, complete caregiver training    Physical Therapy Problems (Active)     Problem: Mobility Transfers     Dates: Start: 12/18/21       Goal: STG-Within one week, patient will perform bed mobility independently.     Dates: Start: 12/18/21          Goal: STG-Within one week, patient will sit to stand with SPV from all surfaces.      Dates: Start: 12/22/21          Goal: STG-Within one week, patient will transfer bed to chair with SPV and no AD.      Dates: Start: 12/22/21             Problem: PT-Long Term Goals     Dates: Start: 12/18/21       Goal: LTG-By discharge, patient will ambulate >150 ft with LRAD mod I.     Dates: Start: 12/18/21          Goal: LTG-By discharge, patient will transfer one surface to another with LRAD mod I.     Dates: Start: 12/18/21          Goal: LTG-By discharge, patient will ambulate up/down flight of stairs with L rail and supervision.     Dates: Start: 12/18/21          Goal: LTG-By discharge, patient will transfer in/out of a car with LRAD and supervision.     Dates: Start: 12/18/21

## 2021-12-26 NOTE — CARE PLAN
Problem: Knowledge Deficit - Standard  Goal: Patient and family/care givers will demonstrate understanding of plan of care, disease process/condition, diagnostic tests and medications  Outcome: Progressing     Problem: Fall Risk - Rehab  Goal: Patient will remain free from falls  Outcome: Progressing   The patient is Stable - Low risk of patient condition declining or worsening    Shift Goals  Clinical Goals: safety  Patient Goals: coretrak out    P

## 2021-12-26 NOTE — CARE PLAN
The patient is Stable - Low risk of patient condition declining or worsening    Shift Goals  Clinical Goals: safety  Patient Goals: safety    Progress made toward(s) clinical / shift goals:    Problem: Fall Risk - Rehab  Goal: Patient will remain free from falls  Outcome: Progressing     Problem: Pain - Standard  Goal: Alleviation of pain or a reduction in pain to the patient’s comfort goal  Outcome: Progressing   Patient able to verbalize needs.  Denies pain or discomfort this shift and no s/s same noted.  Will continue to monitor.   Problem: Knowledge Deficit - Standard  Goal: Patient and family/care givers will demonstrate understanding of plan of care, disease process/condition, diagnostic tests and medications  Outcome: Progressing

## 2021-12-27 LAB
ALBUMIN SERPL BCP-MCNC: 4.3 G/DL (ref 3.2–4.9)
ALP SERPL-CCNC: 94 U/L (ref 30–99)
ALT SERPL-CCNC: 54 U/L (ref 2–50)
AST SERPL-CCNC: 29 U/L (ref 12–45)
BILIRUB CONJ SERPL-MCNC: <0.2 MG/DL (ref 0.1–0.5)
BILIRUB INDIRECT SERPL-MCNC: ABNORMAL MG/DL (ref 0–1)
BILIRUB SERPL-MCNC: 0.5 MG/DL (ref 0.1–1.5)
PROT SERPL-MCNC: 7.1 G/DL (ref 6–8.2)

## 2021-12-27 PROCEDURE — 97129 THER IVNTJ 1ST 15 MIN: CPT

## 2021-12-27 PROCEDURE — 97112 NEUROMUSCULAR REEDUCATION: CPT | Mod: CQ

## 2021-12-27 PROCEDURE — 97535 SELF CARE MNGMENT TRAINING: CPT | Mod: CO

## 2021-12-27 PROCEDURE — 700102 HCHG RX REV CODE 250 W/ 637 OVERRIDE(OP): Performed by: PHYSICAL MEDICINE & REHABILITATION

## 2021-12-27 PROCEDURE — 770010 HCHG ROOM/CARE - REHAB SEMI PRIVAT*

## 2021-12-27 PROCEDURE — 36415 COLL VENOUS BLD VENIPUNCTURE: CPT

## 2021-12-27 PROCEDURE — 97110 THERAPEUTIC EXERCISES: CPT | Mod: CQ

## 2021-12-27 PROCEDURE — A9270 NON-COVERED ITEM OR SERVICE: HCPCS | Performed by: PHYSICAL MEDICINE & REHABILITATION

## 2021-12-27 PROCEDURE — 97530 THERAPEUTIC ACTIVITIES: CPT | Mod: CO

## 2021-12-27 PROCEDURE — 99231 SBSQ HOSP IP/OBS SF/LOW 25: CPT | Performed by: PHYSICAL MEDICINE & REHABILITATION

## 2021-12-27 PROCEDURE — 97116 GAIT TRAINING THERAPY: CPT | Mod: CQ

## 2021-12-27 PROCEDURE — 92507 TX SP LANG VOICE COMM INDIV: CPT

## 2021-12-27 PROCEDURE — 80076 HEPATIC FUNCTION PANEL: CPT

## 2021-12-27 PROCEDURE — 97129 THER IVNTJ 1ST 15 MIN: CPT | Mod: CO

## 2021-12-27 RX ADMIN — Medication 1000 UNITS: at 07:45

## 2021-12-27 RX ADMIN — LISINOPRIL 15 MG: 5 TABLET ORAL at 17:17

## 2021-12-27 RX ADMIN — HYDRALAZINE HYDROCHLORIDE 25 MG: 25 TABLET, FILM COATED ORAL at 20:21

## 2021-12-27 RX ADMIN — LISINOPRIL 15 MG: 5 TABLET ORAL at 05:14

## 2021-12-27 RX ADMIN — AMLODIPINE BESYLATE 10 MG: 5 TABLET ORAL at 05:16

## 2021-12-27 RX ADMIN — HYDRALAZINE HYDROCHLORIDE 25 MG: 25 TABLET, FILM COATED ORAL at 15:12

## 2021-12-27 RX ADMIN — HYDRALAZINE HYDROCHLORIDE 25 MG: 25 TABLET, FILM COATED ORAL at 05:15

## 2021-12-27 ASSESSMENT — GAIT ASSESSMENTS
GAIT LEVEL OF ASSIST: SUPERVISED
GAIT LEVEL OF ASSIST: SUPERVISED
DISTANCE (FEET): 400
DEVIATION: BRADYKINETIC
DEVIATION: BRADYKINETIC

## 2021-12-27 NOTE — CARE PLAN
Problem: Fall Risk - Rehab  Goal: Patient will remain free from falls  Outcome: Progressing  Note: Liz Almodovar Fall risk Assessment Score: 5    Low fall risk interventions   - Call light within reach   - Yellow  socks   - Belongings within reach   - Bed in the lowest position        Problem: Pain - Standard  Goal: Alleviation of pain or a reduction in pain to the patient’s comfort goal  Outcome: Progressing  Note: Denies , verbalizing comfort.   The patient is Stable - Low risk of patient condition declining or worsening    Shift Goals  Clinical Goals: safety  Patient Goals: coretrak out    Progress made toward(s) clinical / shift goals:  Pt free from fall and injury

## 2021-12-27 NOTE — DISCHARGE PLANNING
CM rec'd call from patients  regarding DC questions.  CM answered questions.  CM available as needs arise.

## 2021-12-27 NOTE — THERAPY
"Occupational Therapy  Daily Treatment     Patient Name: Maryjane Gray  Age:  56 y.o., Sex:  female  Medical Record #: [id number]  Today's Date: 12/27/2021     Precautions  Precautions: Other (See Comments)  Comments: IND amb trial in room, R musa, aspiration precautions         Subjective    \"  OK\"   Agreeable to tx activity      Objective/Assessment         12/27/21 0931   Functional Level of Assist   Problem Solving Minimal Assist  ( as related to cooking activity performed )   Memory Minimal Assist  ( as related to cooking activity performed )   Fine Motor / Dexterity    Fine Motor / Dexterity Interventions seated at table focus on  right FMC  utlized  3 jaw gabe pinch for large peg removal from pegboard      worked on  in hand manipulation picking pegs up  turning over and placing in pegboard.      individual finger to thumb pinch for large peg removal      using  dice worked on individual finger to thumb pinch for placement on table top  followed by picking up dropping in to palm  until total of 4  then place on table top  via index finger and thumb.       Interdisciplinary Plan of Care Collaboration   Patient Position at End of Therapy Seated;Call Light within Reach;Tray Table within Reach   OT Total Time Spent   OT Individual Total Time Spent (Mins) 60   OT Charge Group   OT Self Care / ADL 1   OT Cognitive Skill Development First 15 Minutes 1   OT Therapy Activity 2      issued  A right  FMC  HEP   With verbal review .  Will require assist  For use upon d/c     During cooking activity    Good attempts at right  UE functional use  With occasional cues to attend to right UE.   Required  Min assist for memory and problem solving.     Recall of where to locate needed items ( mixing bowl  Muffin pan   Muffin mix etc)  With in 3 minutes of being shown where to locate the items.      Min  A to recall package directions.   Assist with figuring out  How many muffins the mix made when it was not easily found on the " package.  She completed the math with out assist    6 muffins.    But  Placed  8 liners in the muffin pan   Min cues to recall number of muffins we would be making.        Demonstrates max difficulty with dual tasking  As  She was reading package directions while Dr Valverde was rounding with her at the same time.       Functional mobility no device   Supervision room <-> gym    Independent with kitchen mobility       Strengths: Able to follow instructions,Effective communication skills,Independent prior level of function,Manages pain appropriately,Motivated for self care and independence,Pleasant and cooperative,Supportive family,Willingly participates in therapeutic activities  Barriers: Hemiparesis,Impaired activity tolerance,Impaired balance    Plan     ADL routine  12-28-21 in prep for  D/c home 12-29   continued  Functional cognition ( memory / problem solving ) and right UE ther ex and activity to improve  Fine motor skills         Occupational Therapy Goals (Active)     Problem: Dressing     Dates: Start: 12/18/21       Goal: STG-Within one week, patient will dress UB     Dates: Start: 12/18/21       Goal Note filed on 12/18/21 1422 by Vanna Rosales, OT     1) Individualized Goal:  at  SUP level with fading verbal cues PRN.                  Problem: OT Long Term Goals     Dates: Start: 12/18/21       Goal: LTG-By discharge, patient will complete basic self care tasks     Dates: Start: 12/18/21       Goal Note filed on 12/18/21 1422 by Vanna Rosales, OT     1) Individualized Goal:  at a SUP to Mod I level with least restrictive AD and AE PRN.             Goal: LTG-By discharge, patient will perform bathroom transfers     Dates: Start: 12/18/21       Goal Note filed on 12/18/21 1422 by Vanna Rosales, OT     1) Individualized Goal:  at a SUP to Mod I level with least restrictive AD and AE PRN.            Goal: LTG-By discharge, patient will complete basic home management     Dates: Start: 12/18/21        Goal Note filed on 12/18/21 1422 by Vanna Rosales OT     1) Individualized Goal:  at a Min A to Mod I level with least restrictive AD and AE PRN.

## 2021-12-27 NOTE — THERAPY
Physical Therapy   Daily Treatment     Patient Name: Maryjane Gray  Age:  56 y.o., Sex:  female  Medical Record #: [id number]  Today's Date: 12/27/2021     Precautions  Precautions: Other (See Comments)  Comments: IND amb trial in room, R musa, aspiration precautions    Subjective    Patient reporting fatigue today, but agrees to session. Spouse supportive throughout.      Objective       12/27/21 1101   Cognition    Attention Impaired   Comments difficulty repeating instructions   Gait Functional Level of Assist    Gait Level Of Assist Supervised   Assistive Device None   Distance (Feet)   (room <> back gym)   # of Times Distance was Traveled 1   Deviation Bradykinetic  (decreased foot clearance on R with further distance)   Stairs Functional Level of Assist   Level of Assist with Stairs Modified Independent   # of Stairs Climbed 14  (mix of standard and adaptive)   Stairs Description   (1 rail, reciprocal pattern)   Transfer Functional Level of Assist   Bed, Chair, Wheelchair Transfer Independent   Neuro-Muscular Treatments   Neuro-Muscular Treatments Postural Changes;Postural Facilitation;Anterior weight shift;Weight Shift Right;Weight Shift Left   Comments ankle strategies in normal KAY[place]IGH L/R x 15 and ant/post x 15, cues to keep body stiff and only move from ankles.  progressed to knee/hip strategies weight shifting ant/post with hip hinging x 10 each.  Finished with stepping strategies forward/back and L/R against PT resistance at shoulders, then abruptly removing resistance to facilitate step to catch self x 5 each. VCs and TCs provided throughout to push outside KAYLEIGH and comfort.    Interdisciplinary Plan of Care Collaboration   IDT Collaboration with  Family / Caregiver   Patient Position at End of Therapy Seated;Edge of Bed;Family / Friend in Room;Tray Table within Reach;Call Light within Reach   Collaboration Comments spouse present, completed stairs to shows spouse how patient does as he was concerned.    PT  Total Time Spent   PT Individual Total Time Spent (Mins) 30   PT Charge Group   PT Neuromuscular Re-Education / Balance 2   VCs to pick feet from floor with gait, fading cues and patient lacks carryover.     Assessment    Patient shows nervousness with stepping strategies training, but overall does well without significant LOB.  Impaired more towards R side with needing to crossover step to catch self vs lefts side able to lateral step with left foot first.      Strengths: Able to follow instructions,Effective communication skills,Independent prior level of function,Motivated for self care and independence,Manages pain appropriately,Pleasant and cooperative,Supportive family,Willingly participates in therapeutic activities  Barriers: Decreased endurance,Hemiparesis,Home accessibility,Impaired activity tolerance,Impaired balance,Limited mobility,Tube feeding    Plan       Gait without AD, weight shifting ankle/hip strategies, dual tasking, education, patient passport; pt scheduled to d/c home on Wednesday 12/29/21.     Passport items to be completed:  Passport items to be completed:  Get in/out of bed safely, in/out of a vehicle, safely use mobility device, walk or wheel around home/community, navigate up and down stairs, show how to get up/down from the ground, ensure home is accessible, demonstrate HEP, complete caregiver training    Physical Therapy Problems (Active)     Problem: Mobility Transfers     Dates: Start: 12/18/21       Goal: STG-Within one week, patient will perform bed mobility independently.     Dates: Start: 12/18/21          Goal: STG-Within one week, patient will sit to stand with SPV from all surfaces.      Dates: Start: 12/22/21          Goal: STG-Within one week, patient will transfer bed to chair with SPV and no AD.      Dates: Start: 12/22/21             Problem: PT-Long Term Goals     Dates: Start: 12/18/21       Goal: LTG-By discharge, patient will ambulate >150 ft with LRAD mod I.      Dates: Start: 12/18/21          Goal: LTG-By discharge, patient will transfer one surface to another with LRAD mod I.     Dates: Start: 12/18/21          Goal: LTG-By discharge, patient will ambulate up/down flight of stairs with L rail and supervision.     Dates: Start: 12/18/21          Goal: LTG-By discharge, patient will transfer in/out of a car with LRAD and supervision.     Dates: Start: 12/18/21

## 2021-12-27 NOTE — THERAPY
"Physical Therapy   Daily Treatment     Patient Name: Maryjane Gray  Age:  56 y.o., Sex:  female  Medical Record #: [id number]  Today's Date: 12/27/2021     Precautions  Precautions: Other (See Comments)  Comments: IND amb trial in room, R musa, aspiration precautions    Subjective    Agreed to therapy     Objective       12/27/21 1401   Cognition    Speech/ Communication Word Finding Impairment   Gait Functional Level of Assist    Gait Level Of Assist Supervised   Assistive Device None   Distance (Feet) 400   # of Times Distance was Traveled 3   Deviation Bradykinetic  (decreased foot clearance on R with further distance)   Transfer Functional Level of Assist   Bed, Chair, Wheelchair Transfer Independent   Supine Lower Body Exercise   Supine Lower Body Exercises Yes   Bridges Two Legged;3 sets of 10   Hip Abduction Side Lying;3 sets of 10   Straight Leg Raises Front;3 sets of 10   Comments cues for proper form and control, decreased ROM on R as reps increased/attention   Sitting Lower Body Exercises   Sitting Lower Body Exercises Yes   Nustep Resistance Level 5;Time (See Comments)  (15' B LE/UE with dual task component)   Neuro-Muscular Treatments   Neuro-Muscular Treatments Compensatory Strategies;Postural Changes;Verbal Cuing   Comments dynamic balance rebounder while standing on air ex pad 50 reps with soccer ball, initially started activity with 4lb weighted ball and pt has difficulty with fine motor/coordination and was unable to catch the ball. pt also participated in memory activty of spaced recall while on the nutep, pt was shown 2, 3, and 4 playing cards to remember with 30\" to 1' to recall the cards in the correct order. pt was 100% accurate with 1 minute recall of 2 playing cards, 75% accuracy with 3 cards for a 30\" recall and 100% with 4 playing cards with 30\" recall. we discussed memory strategies that patient has been given also.   Interdisciplinary Plan of Care Collaboration   IDT Collaboration with  " Family / Caregiver;Certified O.T. Assistant  (REYNA);Speech Therapist   Patient Position at End of Therapy Seated   Collaboration Comments scheduled FT with pt SO for 12/28   PT Total Time Spent   PT Individual Total Time Spent (Mins) 60   PT Charge Group   PT Gait Training 1   PT Therapeutic Exercise 1   PT Neuromuscular Re-Education / Balance 2   Supervising Physical Therapist Soy Figueroa     Way finding in the building, pt able to navigate from back gym > lobby > pt room > main gym with supervision. Few instances of R toe catch as distance increased however no LOB's noted    Assessment    The patient tolerated the session well with focus on dual task with functional mobility targeting STM recall. Pt requires SPV for safety with mobility due to decreased attention to task  Strengths: Able to follow instructions,Effective communication skills,Independent prior level of function,Motivated for self care and independence,Manages pain appropriately,Pleasant and cooperative,Supportive family,Willingly participates in therapeutic activities  Barriers: Decreased endurance,Hemiparesis,Home accessibility,Impaired activity tolerance,Impaired balance,Limited mobility,Tube feeding    Plan    Curb, car transfer, safety edu, fall recovery, recommending 24hour SPV    FT with spouse, collect IRF-NOBLE data and complete DC    Physical Therapy Problems (Active)     Problem: Mobility Transfers     Dates: Start: 12/18/21       Goal: STG-Within one week, patient will perform bed mobility independently.     Dates: Start: 12/18/21          Goal: STG-Within one week, patient will sit to stand with SPV from all surfaces.      Dates: Start: 12/22/21          Goal: STG-Within one week, patient will transfer bed to chair with SPV and no AD.      Dates: Start: 12/22/21             Problem: PT-Long Term Goals     Dates: Start: 12/18/21       Goal: LTG-By discharge, patient will ambulate >150 ft with LRAD mod I.     Dates: Start: 12/18/21           Goal: LTG-By discharge, patient will transfer one surface to another with LRAD mod I.     Dates: Start: 12/18/21          Goal: LTG-By discharge, patient will ambulate up/down flight of stairs with L rail and supervision.     Dates: Start: 12/18/21          Goal: LTG-By discharge, patient will transfer in/out of a car with LRAD and supervision.     Dates: Start: 12/18/21

## 2021-12-27 NOTE — PROGRESS NOTES
"Rehab Progress Note     Date of Service: 12/27/2021  Chief Complaint: Follow-up hemorrhagic stroke    Interval Events (Subjective)    Patient seen and examined today in the therapy gym.   She is working with OT making some muffins.  She is eager for her discharge home on Weds.  She is unable to get out the word for her home pharmacy.    ROS: No changes to bowel, bladder, pain, mood, or sleep.          Objective:  VITAL SIGNS: /66   Pulse 63   Temp 36.4 °C (97.5 °F) (Temporal)   Resp 18   Ht 1.626 m (5' 4.02\")   Wt 66 kg (145 lb 8.1 oz)   SpO2 96%   BMI 24.96 kg/m²   Gen: alert, no apparent distress  Neuro: notable for word finding difficulty, right sided hemiparesis/incoordination    Recent Results (from the past 72 hour(s))   HEPATIC FUNCTION PANEL    Collection Time: 12/27/21  6:15 AM   Result Value Ref Range    Alkaline Phosphatase 94 30 - 99 U/L    AST(SGOT) 29 12 - 45 U/L    ALT(SGPT) 54 (H) 2 - 50 U/L    Total Bilirubin 0.5 0.1 - 1.5 mg/dL    Direct Bilirubin <0.2 0.1 - 0.5 mg/dL    Indirect Bilirubin see below 0.0 - 1.0 mg/dL    Albumin 4.3 3.2 - 4.9 g/dL    Total Protein 7.1 6.0 - 8.2 g/dL       Current Facility-Administered Medications   Medication Frequency   • vitamin D3 (cholecalciferol) tablet 1,000 Units DAILY   • hydrALAZINE (APRESOLINE) tablet 25 mg Q8HRS   • amLODIPine (NORVASC) tablet 10 mg Q DAY   • lisinopril (PRINIVIL) tablet 15 mg TWICE DAILY   • senna-docusate (PERICOLACE or SENOKOT S) 8.6-50 MG per tablet 2 Tablet BID    And   • polyethylene glycol/lytes (MIRALAX) PACKET 1 Packet QDAY PRN    And   • magnesium hydroxide (MILK OF MAGNESIA) suspension 30 mL QDAY PRN    And   • bisacodyl (DULCOLAX) suppository 10 mg QDAY PRN   • Respiratory Therapy Consult Continuous RT   • Pharmacy Consult Request ...Pain Management Review 1 Each PHARMACY TO DOSE   • docusate sodium (ENEMEEZ) enema 283 mg QDAY PRN   • sodium phosphate (Fleet) enema 133 mL QDAY PRN   • artificial tears ophthalmic " solution 1 Drop PRN   • benzocaine-menthol (CEPACOL) lozenge 1 Lozenge Q2HRS PRN   • sodium chloride (OCEAN) 0.65 % nasal spray 2 Spray PRN   • midazolam (VERSED) 5 mg/mL (1 mL vial) PRN   • acetaminophen (Tylenol) tablet 650 mg Q4HRS PRN   • hydrOXYzine HCl (ATARAX) tablet 50 mg Q6HRS PRN   • lactulose 20 GM/30ML solution 30 mL QDAY PRN   • mag hydrox-al hydrox-simeth (MAALOX PLUS ES or MYLANTA DS) suspension 20 mL Q2HRS PRN   • melatonin tablet 3 mg HS PRN   • ondansetron (ZOFRAN ODT) dispertab 4 mg 4X/DAY PRN    Or   • ondansetron (ZOFRAN) syringe/vial injection 4 mg 4X/DAY PRN   • traZODone (DESYREL) tablet 50 mg QHS PRN       Orders Placed This Encounter   Procedures   • Diet Order Diet: Regular (meds whole with thin, floated in puree if necessary.  Cut large pills in half.); Tray Modifications (optional): SLP - 1:1 Supervision by Nursing, SLP - Deliver to Nursing Station     Standing Status:   Standing     Number of Occurrences:   1     Order Specific Question:   Diet:     Answer:   Regular [1]     Comments:   meds whole with thin, floated in puree if necessary.  Cut large pills in half.     Order Specific Question:   Tray Modifications (optional)     Answer:   SLP - 1:1 Supervision by Nursing     Order Specific Question:   Tray Modifications (optional)     Answer:   SLP - Deliver to Nursing Station       Assessment:  Active Hospital Problems    Diagnosis    • *Hemorrhagic stroke (HCC)    • Azotemia    • Transaminitis    • Low grade fever    • Hypernatremia    • Hypokalemia    • Dysphagia    • Other hyperlipidemia    • Vitamin D deficiency    • Impaired mobility and ADLs    • Hyperglycemia    • Leukocytosis    • Primary hypertension      This patient is a 56 y.o. female admitted for acute inpatient rehabilitation with Hemorrhagic stroke (HCC).    I led and attended the weekly conference, and agree with the IDT conference documentation and plan of care as noted below.     Date of conference: 12/22/2021    Goals  and barriers: See IDT note.    Biggest barriers: impaired balance, right sided weakness/incoordination, right neglect, impaired memory    CM/social support:  supportive    Anticipated DC date: 12/29    Outpatient OT/PT/SLP    Equip: none needed    Follow up: PCP, stroke bridge clinic, Dr. Murphy        Medical Decision Making and Plan:    Left subcortical MCA hemorrhagic stroke  Right hemiparesis, improved  Right sided incoordination, improved  Right neglect, improved  Cognitive impairment, continues  Dysphagia, resolved  Continue full rehab program  PT/OT/SLP, 1 hr each discipline, 5 days per week  Recreational therapy consult    Statin currently on hold due to elevated liver enzymes  Not on antiplatelets due to hemorrhagic stroke    NG tube removed , diet advanced    Outpatient follow up with Dr. Murphy, stroke bridge clinic, referrals made    Hypertension  Amlodipine  Hydralazine  Lisinopril  Currently with good control    Hyperlipidemia  Statin discontinue due to elevated liver enzymes  Will restart after resolution     Transaminitis, improved  Recheck as an outpatient     Hypernatremia, resolved  Status post 1/2 normal saline  Appreciate hospitalist assistance     Leukocytosis, resolved  Negative UA and CXR     Hyperglycemia  Likely from tube feeds  SSI discontinued  HgbA1c 5.4    Bowel program  Continue bowel medications  Last BM 12/24    Bladder program  Check PVRs - 11, 1  Not retaining  Bladder scan for no voids  ICP for over 400 cc  Scheduled toileting    DVT prophylaxis  Contraindicated given hemorrhage.   Compression stockings on in am, off in pm.  Increase mobility. Monitor for swelling.    Total time:  15 minutes.  I spent greater than 50% of the time for patient care, counseling, and coordination on this date, including patient face-to face time, unit/floor time with review of records/pertinent lab data and studies, as well as discussing diagnostic evaluation/work up, planned therapeutic  interventions, and future disposition of care, as per the interval events/subjective and the assessment and plan as noted above.      I have performed a physical exam, reviewed and updated ROS, as well as the assessment and plan today 12/27/2021. In review of note from 12/25/2021 there are no new changes except as documented above.              Vee Valverde M.D.  Physical Medicine and Rehabilitation

## 2021-12-27 NOTE — THERAPY
Speech Language Pathology  Daily Treatment     Patient Name: Maryjane Gray  Age:  56 y.o., Sex:  female  Medical Record #: [id number]  Today's Date: 2021     Precautions  Precautions: Other (See Comments)  Comments: IND amb trial in room, R musa, aspiration precautions    Subjective    Pt pleasant and cooperative during tx.       Objective       21 0831   Expressive Language   Word Finding Deficits Moderate (3)   Cognition   Complex Reasoning  / Problem Solving Severe (2)   SLP Total Time Spent   SLP Individual Total Time Spent (Mins) 30   Treatment Charges   SLP Treatment - Individual Speech Language Treatment - Individual   SLP Cognitive Skill Development First 15 Minutes 1       Assessment    Logical scheduling (Lynette's Schedule): 14% independent; 100% given mod-max verbal cues to attend to written details and to utilize process of elimination.  [place] category namin words per minute (average)     Strengths: Able to follow instructions,Willingly participates in therapeutic activities,Supportive family,Pleasant and cooperative,Independent prior level of function  Barriers: Impaired functional cognition    Plan    Word finding, executive function/problem solving, recall.         Speech Therapy Problems (Active)     Problem: Expression STGs     Dates: Start: 21       Goal: STG-Within one week, patient will state 15 items per minute given concrete category and min verbal cues.       Dates: Start: 21       Goal Note filed on 21 1251 by Inna Mitchell MS,CCC-SLP     Patient is generating 1 target for given target category,                Problem: Problem Solving STGs     Dates: Start: 21       Goal: STG-Within one week, patient will complete executive function tasks with 80% accuracy given min verbal cues.       Dates: Start: 21       Goal Note filed on 21 1251 by Inna Mitchell MS,CCC-SLP     To be addressed.               Problem: Social Interaction STGs      Dates: Start: 12/18/21       Goal: STG-Within one week, patient will     Dates: Start: 12/18/21          Goal: STG-Within one week, patient will     Dates: Start: 12/18/21             Problem: Speech/Swallowing LTGs     Dates: Start: 12/18/21       Goal: LTG-By discharge, patient will safely swallow regular textures and thin liquids without aspiration.        Dates: Start: 12/18/21          Goal: LTG-By discharge, patient will solve complex problem Luisito for safe discharge home.     Dates: Start: 12/18/21             Problem: Swallowing STGs     Dates: Start: 12/18/21

## 2021-12-27 NOTE — CARE PLAN
The patient is Stable - Low risk of patient condition declining or worsening        Problem: Fall Risk - Rehab  Goal: Patient will remain free from falls  Note: Pt Mod I in room, encouraged pt to use call light when in need of assistance.     Problem: Pain - Standard  Goal: Alleviation of pain or a reduction in pain to the patient’s comfort goal  Note: Patient able to verbalize needs.  Denies pain or discomfort this shift and no s/s same noted.  Will continue to monitor.

## 2021-12-28 PROCEDURE — 97530 THERAPEUTIC ACTIVITIES: CPT

## 2021-12-28 PROCEDURE — 97116 GAIT TRAINING THERAPY: CPT | Mod: CQ

## 2021-12-28 PROCEDURE — 97530 THERAPEUTIC ACTIVITIES: CPT | Mod: CQ

## 2021-12-28 PROCEDURE — 700102 HCHG RX REV CODE 250 W/ 637 OVERRIDE(OP): Performed by: PHYSICAL MEDICINE & REHABILITATION

## 2021-12-28 PROCEDURE — 97110 THERAPEUTIC EXERCISES: CPT | Mod: CQ

## 2021-12-28 PROCEDURE — 97130 THER IVNTJ EA ADDL 15 MIN: CPT

## 2021-12-28 PROCEDURE — 770010 HCHG ROOM/CARE - REHAB SEMI PRIVAT*

## 2021-12-28 PROCEDURE — A9270 NON-COVERED ITEM OR SERVICE: HCPCS | Performed by: PHYSICAL MEDICINE & REHABILITATION

## 2021-12-28 PROCEDURE — 97110 THERAPEUTIC EXERCISES: CPT

## 2021-12-28 PROCEDURE — 97129 THER IVNTJ 1ST 15 MIN: CPT

## 2021-12-28 PROCEDURE — 99232 SBSQ HOSP IP/OBS MODERATE 35: CPT | Performed by: PHYSICAL MEDICINE & REHABILITATION

## 2021-12-28 RX ORDER — AMLODIPINE BESYLATE 10 MG/1
10 TABLET ORAL DAILY
Qty: 30 TABLET | Refills: 0 | Status: SHIPPED | OUTPATIENT
Start: 2021-12-29 | End: 2022-01-10 | Stop reason: SDUPTHER

## 2021-12-28 RX ORDER — HYDRALAZINE HYDROCHLORIDE 25 MG/1
25 TABLET, FILM COATED ORAL EVERY 8 HOURS
Qty: 90 TABLET | Refills: 0 | Status: SHIPPED | OUTPATIENT
Start: 2021-12-28 | End: 2022-01-10 | Stop reason: SDUPTHER

## 2021-12-28 RX ORDER — LISINOPRIL 5 MG/1
15 TABLET ORAL 2 TIMES DAILY
Qty: 30 TABLET | Refills: 0 | Status: SHIPPED | OUTPATIENT
Start: 2021-12-28 | End: 2022-01-10 | Stop reason: SDUPTHER

## 2021-12-28 RX ADMIN — LISINOPRIL 15 MG: 5 TABLET ORAL at 05:19

## 2021-12-28 RX ADMIN — HYDRALAZINE HYDROCHLORIDE 25 MG: 25 TABLET, FILM COATED ORAL at 05:19

## 2021-12-28 RX ADMIN — Medication 1000 UNITS: at 09:21

## 2021-12-28 RX ADMIN — HYDRALAZINE HYDROCHLORIDE 25 MG: 25 TABLET, FILM COATED ORAL at 20:25

## 2021-12-28 RX ADMIN — HYDRALAZINE HYDROCHLORIDE 25 MG: 25 TABLET, FILM COATED ORAL at 14:22

## 2021-12-28 RX ADMIN — AMLODIPINE BESYLATE 10 MG: 5 TABLET ORAL at 05:19

## 2021-12-28 RX ADMIN — LISINOPRIL 15 MG: 5 TABLET ORAL at 17:12

## 2021-12-28 ASSESSMENT — GAIT ASSESSMENTS
DEVIATION: OTHER (COMMENT)
GAIT LEVEL OF ASSIST: SUPERVISED
DISTANCE (FEET): 250

## 2021-12-28 ASSESSMENT — ACTIVITIES OF DAILY LIVING (ADL)
SHOWER_TRANSFER_LEVEL_OF_ASSIST: REQUIRES SUPERVISION WITH SHOWER TRANSFER
TOILETING_LEVEL_OF_ASSIST: ABLE TO COMPLETE TOILETING WITHOUT ASSIST
TOILET_TRANSFER_LEVEL_OF_ASSIST: ABLE TO COMPLETE TOILET TRANSFER WITHOUT ASSIST

## 2021-12-28 NOTE — THERAPY
Occupational Therapy  Daily Treatment     Patient Name: Maryjane Gray  Age:  56 y.o., Sex:  female  Medical Record #: [id number]  Today's Date: 12/28/2021     Precautions  Precautions: (P) Other (See Comments)  Comments: (P) ind in room         Subjective    Pt received seated in room, agreeable to OT session, reported she showered this morning without staff assist despite repeated instructions from staff not to do so, reports completing in seated     Objective       12/28/21 0831   Precautions   Precautions Other (See Comments)   Comments ind in room   Strength Upper Body   Rt Shoulder Flexion Strength 4 (G)   Rt Shoulder Abduction Strength 4 (G)   Rt Elbow Flexion Strength 5 (N)   Rt Elbow Extension Strength 4 (G)   Right  Impaired   Sitting Upper Body Exercises   Chest Press 2 sets of 15;Bilateral   Front Arm Raise Bilateral;2 sets of 10   Side Arm Raise 2 sets of 10;Bilateral   External Shoulder Rotation 2 sets of 15;Bilateral   Bicep Curls 2 sets of 15;Bilateral   Pronation / Supination 2 sets of 15;Bilateral   Comments therex performed with 2# dumbell with the exception of RUE on side arm raise (no weight). Mirror used for visual feedback for increased symmetry   OT Total Time Spent   OT Individual Total Time Spent (Mins) 30   OT Charge Group   OT Therapeutic Exercise  2       Assessment    Pt tolerated session well, focus on progression of RUE gross motor function and sustained grasp via UE therex. Continued education provided on strategies to progress RUE function at home. Discussed safety at home with rec of  present initially for showers to maximize safety,  coming in later this morning for FT. Pt continues to present with significant deficits in memory and attention, decreased RUE function though improving.     Strengths: Able to follow instructions,Effective communication skills,Independent prior level of function,Manages pain appropriately,Motivated for self care and  independence,Pleasant and cooperative,Supportive family,Willingly participates in therapeutic activities  Barriers: Hemiparesis,Impaired activity tolerance,Impaired balance    Plan    FT with  later this morning, continue to progress RUE function, functional cog, attention, prep for d/c home tomorrow    Occupational Therapy Goals (Active)     Problem: Dressing     Dates: Start: 12/18/21       Goal: STG-Within one week, patient will dress UB     Dates: Start: 12/18/21       Goal Note filed on 12/18/21 1422 by Vanna Rosales, OT     1) Individualized Goal:  at  SUP level with fading verbal cues PRN.                  Problem: OT Long Term Goals     Dates: Start: 12/18/21       Goal: LTG-By discharge, patient will complete basic self care tasks     Dates: Start: 12/18/21       Goal Note filed on 12/18/21 1422 by Vanna Rosales, OT     1) Individualized Goal:  at a SUP to Mod I level with least restrictive AD and AE PRN.             Goal: LTG-By discharge, patient will perform bathroom transfers     Dates: Start: 12/18/21       Goal Note filed on 12/18/21 1422 by Vanna Rosales, OT     1) Individualized Goal:  at a SUP to Mod I level with least restrictive AD and AE PRN.            Goal: LTG-By discharge, patient will complete basic home management     Dates: Start: 12/18/21       Goal Note filed on 12/18/21 1422 by Vanna Rosales, OT     1) Individualized Goal:  at a Min A to Mod I level with least restrictive AD and AE PRN.

## 2021-12-28 NOTE — THERAPY
Physical Therapy   Daily Treatment     Patient Name: Maryjane Gray  Age:  56 y.o., Sex:  female  Medical Record #: [id number]  Today's Date: 12/28/2021     Precautions  Precautions: Other (See Comments)  Comments: ind in room    Subjective    Pt and spouse ready for FT     Objective       12/28/21 0931   Vitals   Pulse 66   Patient BP Position Sitting   Blood Pressure (!) 96/46   Gait Functional Level of Assist    Gait Level Of Assist Supervised   Assistive Device None   Distance (Feet) 250   # of Times Distance was Traveled 3   Deviation Other (Comment)  (decreased foot clearance on R with further distance)   Wheelchair Functional Level of Assist   Wheelchair Assist   (n/a)   Transfer Functional Level of Assist   Bed, Chair, Wheelchair Transfer Independent   Bed Chair Wheelchair Transfer Description   (stnad step tranfer no AD)   Standing Lower Body Exercises   Standing Lower Body Exercises Yes   Hamstring Curl 2 sets of 10   Hip Flexion 2 sets of 10   Hip Extension 2 sets of 10   Hip Abduction 2 sets of 10   Marching 2 sets of 10   Heel Rise 2 sets of 10   Toe Rise 2 sets of 10   Mini Squat Partial;2 sets of 10   Comments standing HEP with B UE support    Bed Mobility    Supine to Sit Independent   Sit to Supine Independent   Sit to Stand Independent   Scooting Independent   Rolling Independent   Interdisciplinary Plan of Care Collaboration   IDT Collaboration with  Family / Caregiver;Physician;Occupational Therapist;Speech Therapist   Patient Position at End of Therapy Seated   Collaboration Comments pt spouse present for FT has queation re: bp and meds-referred to MD. ST/OT-CLOF and FT   PT Total Time Spent   PT Individual Total Time Spent (Mins) 30   PT Charge Group   PT Therapeutic Activities 2   Supervising Physical Therapist Nick Argueta     Completed FT fro PT with emphasis on need for intermittent supervision to navigate stairs, curbs, outdoor amb and for standing TE.  Reviewed standing HEP HO, curb  training for home entry, mild R toe drag with longer distance amb, floor recovery, fall safety packet and car transfer.   Assessment    Pt spouse was present for FT session, above mobility training completed with pt ranging from SBA-Luisito.  SO returns verbal comprehension of all education provided. The patient will follow up with HH, no equipment needs. Pt is prepared for DC  Strengths: Able to follow instructions,Effective communication skills,Independent prior level of function,Motivated for self care and independence,Manages pain appropriately,Pleasant and cooperative,Supportive family,Willingly participates in therapeutic activities  Barriers: Decreased endurance,Hemiparesis,Home accessibility,Impaired activity tolerance,Impaired balance,Limited mobility,Tube feeding    Plan    Dc 12/29    Physical Therapy Problems (Active)     Problem: Mobility Transfers     Dates: Start: 12/18/21       Goal: STG-Within one week, patient will perform bed mobility independently.     Dates: Start: 12/18/21          Goal: STG-Within one week, patient will sit to stand with SPV from all surfaces.      Dates: Start: 12/22/21          Goal: STG-Within one week, patient will transfer bed to chair with SPV and no AD.      Dates: Start: 12/22/21             Problem: PT-Long Term Goals     Dates: Start: 12/18/21       Goal: LTG-By discharge, patient will ambulate >150 ft with LRAD mod I.     Dates: Start: 12/18/21          Goal: LTG-By discharge, patient will transfer one surface to another with LRAD mod I.     Dates: Start: 12/18/21          Goal: LTG-By discharge, patient will ambulate up/down flight of stairs with L rail and supervision.     Dates: Start: 12/18/21          Goal: LTG-By discharge, patient will transfer in/out of a car with LRAD and supervision.     Dates: Start: 12/18/21

## 2021-12-28 NOTE — THERAPY
Recreational Therapy  Daily Treatment     Patient Name: Maryjane Gray  AGE:  56 y.o., SEX:  female  Medical Record #: [id number]  Today's Date: 12/28/2021       Subjective    Pt reporting she feels ready to dc. Sharing that her daughter has many games for her to play for when she returns home for cognitive leisure.      Objective       12/28/21 1301   Functional Ability Status - Cognitive   Attention Span Remains on Task   Comprehension Follows Two Step Commands   Judgment Able to Make Independent Decisions;Needs Assistance   Functional Ability Status - Emotional    Affect Appropriate;Bright   Mood Appropriate   Behavior Appropriate   Skilled Intervention    Skilled Intervention Cognitive Leisure   Skilled Intervention Comments [place]   Interdisciplinary Plan of Care Collaboration   Patient Position at End of Therapy Seated;Other (Comments)  (Mod I in room)   Strengths & Barriers   Strengths Able to follow instructions;Alert and oriented;Motivated for self care and independence;Pleasant and cooperative;Supportive family;Willingly participates in therapeutic activities;Good endurance;Good balance;Making steady progress towards goals   Barriers Impaired activity tolerance   Treatment Time   Total Time Spent (mins) 30   Procedural Tracking   Procedural Tracking Community Re-Integration;Community Skills Development;Leisure Skills Awareness;Leisure Skills Development;Fine Motor Functional Leisure Skills       Assessment    Isola a new game with Min verbal cues from this writer except for one game piece which she required Max cues for proper placement.     Strengths: (P) Able to follow instructions,Alert and oriented,Motivated for self care and independence,Pleasant and cooperative,Supportive family,Willingly participates in therapeutic activities,Good endurance,Good balance,Making steady progress towards goals  Barriers: (P) Impaired activity tolerance    Plan    dc    Passport items to be completed:  Passport items  to be completed:  Verbalize two positive leisure activities, discuss returning to work, hobbies, community groups or volunteer activities, explore community resources

## 2021-12-28 NOTE — THERAPY
Speech Language Pathology  Daily Treatment     Patient Name: Maryjane Gray  Age:  56 y.o., Sex:  female  Medical Record #: [id number]  Today's Date: 12/28/2021     Precautions  Precautions: Other (See Comments)  Comments: ind in room    Subjective    Patient and spouse participated in family training.      Objective       12/28/21 1102   Expressive Language   Word Finding Deficits Moderate (3)   SLP Total Time Spent   SLP Individual Total Time Spent (Mins) 60   Treatment Charges   SLP Cognitive Skill Development First 15 Minutes 1   SLP Cognitive Skill Development Additional 15 Minutes 3       Assessment    Family training completed. Provided education and strategies regarding word finding and expressive aphasia. Reviewed home program.   Ongoing stroke education.   Recommend assist for medication and financial management tasks. Spouse and patient verbalized understanding.  Strengths: Able to follow instructions,Willingly participates in therapeutic activities,Supportive family,Pleasant and cooperative,Independent prior level of function  Barriers: Impaired functional cognition    Plan  Prepare to d/c home with spouse      Speech Therapy Problems (Active)     Problem: Expression STGs     Dates: Start: 12/18/21       Goal: STG-Within one week, patient will state 15 items per minute given concrete category and min verbal cues.       Dates: Start: 12/18/21       Goal Note filed on 12/22/21 1251 by Inna Mitchell MS,CCC-SLP     Patient is generating 1 target for given target category, 7/16               Problem: Problem Solving STGs     Dates: Start: 12/18/21       Goal: STG-Within one week, patient will complete executive function tasks with 80% accuracy given min verbal cues.       Dates: Start: 12/18/21       Goal Note filed on 12/22/21 1251 by Inna Mitchell MS,CCC-SLP     To be addressed.               Problem: Social Interaction STGs     Dates: Start: 12/18/21       Goal: STG-Within one week, patient will      Dates: Start: 12/18/21          Goal: STG-Within one week, patient will     Dates: Start: 12/18/21             Problem: Speech/Swallowing LTGs     Dates: Start: 12/18/21       Goal: LTG-By discharge, patient will solve complex problem Luisito for safe discharge home.     Dates: Start: 12/18/21             Problem: Swallowing STGs     Dates: Start: 12/18/21

## 2021-12-28 NOTE — THERAPY
Occupational Therapy  Daily Treatment     Patient Name: Maryjane Gray  Age:  56 y.o., Sex:  female  Medical Record #: [id number]  Today's Date: 12/28/2021     Precautions  Precautions: (P) Other (See Comments)  Comments: (P) ind in room         Subjective    Pt and  received seated in room, ready for family training     Objective       12/28/21 1031   Precautions   Precautions Other (See Comments)   Comments ind in room   Interdisciplinary Plan of Care Collaboration   IDT Collaboration with  Family / Caregiver   Patient Position at End of Therapy Seated   Collaboration Comments  present for FT, see note for details   OT Total Time Spent   OT Individual Total Time Spent (Mins) 30   OT Charge Group   OT Therapy Activity 2      present, follow up discussion on bathroom setup,  reports he has purchased TTB and suction grab bars, will be assembling/installing today prior to d/c home, verbalized understanding of initial supervision for safety with anticipation of moving to independent within 1-2 weeks. Discussed safety with higher level IADLs with rec for delay in return to driving and work, supervision for cooking tasks. Discussed strategies for progressing RUE function, patient has squeeze block, theraputty, and printed FM HEP, able to demo proficiency with block and theraputty.     Assessment    Pt's  present and supportive during family training, has made appropriate bathroom modifications per previously discussed recommendations, verbalized understanding of recommendations and asking appropriate questions. Discussed role of HHOT in continued progression.     Strengths: Able to follow instructions,Effective communication skills,Independent prior level of function,Manages pain appropriately,Motivated for self care and independence,Pleasant and cooperative,Supportive family,Willingly participates in therapeutic activities  Barriers: Hemiparesis,Impaired activity tolerance,Impaired  balance    Plan    prep for d/c home tomorrow, HHOT follow up for continued progression    Occupational Therapy Goals (Active)     Problem: Dressing     Dates: Start: 12/18/21       Goal: STG-Within one week, patient will dress UB     Dates: Start: 12/18/21       Goal Note filed on 12/18/21 1422 by Vanna Rosales, OT     1) Individualized Goal:  at  SUP level with fading verbal cues PRN.                  Problem: OT Long Term Goals     Dates: Start: 12/18/21       Goal: LTG-By discharge, patient will complete basic self care tasks     Dates: Start: 12/18/21       Goal Note filed on 12/18/21 1422 by Vanna Rosales, OT     1) Individualized Goal:  at a SUP to Mod I level with least restrictive AD and AE PRN.             Goal: LTG-By discharge, patient will perform bathroom transfers     Dates: Start: 12/18/21       Goal Note filed on 12/18/21 1422 by Vanna Rosales, OT     1) Individualized Goal:  at a SUP to Mod I level with least restrictive AD and AE PRN.            Goal: LTG-By discharge, patient will complete basic home management     Dates: Start: 12/18/21       Goal Note filed on 12/18/21 1422 by Vanna Rosales, OT     1) Individualized Goal:  at a Min A to Mod I level with least restrictive AD and AE PRN.

## 2021-12-28 NOTE — CARE PLAN
The patient is Stable - Low risk of patient condition declining or worsening    Shift Goals  Clinical Goals: safety  Patient Goals: Safety  Progress made toward(s) clinical / shift goals:    VSS. Sleeping during rounds. Denies pain or discomfort.

## 2021-12-28 NOTE — PROGRESS NOTES
"Rehab Progress Note     Date of Service: 12/28/2021  Chief Complaint: Follow-up hemorrhagic stroke    Interval Events (Subjective)  Patient seen and examined with PT at side, while walking to therapy gym. No use of AD, SBA. Patient is unable to remember her pharmacy name, but is able to describe the location ([facility]). Patient denies complaints, no HA, lightheadedness, or dizziness. Is looking forward to going home tomorrow.     ROS: No changes to bowel, bladder, pain, mood, or sleep.          Objective:  VITAL SIGNS: /67   Pulse 64   Temp 36.5 °C (97.7 °F) (Temporal)   Resp 18   Ht 1.626 m (5' 4.02\")   Wt 66 kg (145 lb 8.1 oz)   SpO2 96%   BMI 24.96 kg/m²   Gen: alert, no apparent distress  Neuro: notable for word finding difficulty, right sided hemiparesis/incoordination   MSK : ambulating without AD, SBA, no near loss of balance     Recent Results (from the past 72 hour(s))   HEPATIC FUNCTION PANEL    Collection Time: 12/27/21  6:15 AM   Result Value Ref Range    Alkaline Phosphatase 94 30 - 99 U/L    AST(SGOT) 29 12 - 45 U/L    ALT(SGPT) 54 (H) 2 - 50 U/L    Total Bilirubin 0.5 0.1 - 1.5 mg/dL    Direct Bilirubin <0.2 0.1 - 0.5 mg/dL    Indirect Bilirubin see below 0.0 - 1.0 mg/dL    Albumin 4.3 3.2 - 4.9 g/dL    Total Protein 7.1 6.0 - 8.2 g/dL       Current Facility-Administered Medications   Medication Frequency   • vitamin D3 (cholecalciferol) tablet 1,000 Units DAILY   • hydrALAZINE (APRESOLINE) tablet 25 mg Q8HRS   • amLODIPine (NORVASC) tablet 10 mg Q DAY   • lisinopril (PRINIVIL) tablet 15 mg TWICE DAILY   • senna-docusate (PERICOLACE or SENOKOT S) 8.6-50 MG per tablet 2 Tablet BID    And   • polyethylene glycol/lytes (MIRALAX) PACKET 1 Packet QDAY PRN    And   • magnesium hydroxide (MILK OF MAGNESIA) suspension 30 mL QDAY PRN    And   • bisacodyl (DULCOLAX) suppository 10 mg QDAY PRN   • Respiratory Therapy Consult Continuous RT   • Pharmacy Consult Request ...Pain " Management Review 1 Each PHARMACY TO DOSE   • docusate sodium (ENEMEEZ) enema 283 mg QDAY PRN   • sodium phosphate (Fleet) enema 133 mL QDAY PRN   • artificial tears ophthalmic solution 1 Drop PRN   • benzocaine-menthol (CEPACOL) lozenge 1 Lozenge Q2HRS PRN   • sodium chloride (OCEAN) 0.65 % nasal spray 2 Spray PRN   • midazolam (VERSED) 5 mg/mL (1 mL vial) PRN   • acetaminophen (Tylenol) tablet 650 mg Q4HRS PRN   • hydrOXYzine HCl (ATARAX) tablet 50 mg Q6HRS PRN   • lactulose 20 GM/30ML solution 30 mL QDAY PRN   • mag hydrox-al hydrox-simeth (MAALOX PLUS ES or MYLANTA DS) suspension 20 mL Q2HRS PRN   • melatonin tablet 3 mg HS PRN   • ondansetron (ZOFRAN ODT) dispertab 4 mg 4X/DAY PRN    Or   • ondansetron (ZOFRAN) syringe/vial injection 4 mg 4X/DAY PRN   • traZODone (DESYREL) tablet 50 mg QHS PRN       Orders Placed This Encounter   Procedures   • Diet Order Diet: Regular (meds whole with thin, floated in puree if necessary.  Cut large pills in half.); Tray Modifications (optional): SLP - 1:1 Supervision by Nursing, SLP - Deliver to Nursing Station     Standing Status:   Standing     Number of Occurrences:   1     Order Specific Question:   Diet:     Answer:   Regular [1]     Comments:   meds whole with thin, floated in puree if necessary.  Cut large pills in half.     Order Specific Question:   Tray Modifications (optional)     Answer:   SLP - 1:1 Supervision by Nursing     Order Specific Question:   Tray Modifications (optional)     Answer:   SLP - Deliver to Nursing Station       Assessment:  Active Hospital Problems    Diagnosis    • *Hemorrhagic stroke (HCC)    • Azotemia    • Transaminitis    • Low grade fever    • Hypernatremia    • Hypokalemia    • Dysphagia    • Other hyperlipidemia    • Vitamin D deficiency    • Impaired mobility and ADLs    • Hyperglycemia    • Leukocytosis    • Primary hypertension      This patient is a 56 y.o. female admitted for acute inpatient rehabilitation with Hemorrhagic stroke  (Formerly [facility]).    Dr. Valverde led and attended the weekly conference, and agree with the IDT conference documentation and plan of care as noted below.     Date of conference: 12/22/2021    Goals and barriers: See IDT note.    Biggest barriers: impaired balance, right sided weakness/incoordination, right neglect, impaired memory    CM/social support:  supportive    Anticipated DC date: 12/29    Outpatient OT/PT/SLP    Equip: none needed    Follow up: PCP, stroke bridge clinic, Dr. Murphy        Medical Decision Making and Plan:    Left subcortical MCA hemorrhagic stroke  Right hemiparesis, improved  Right sided incoordination, improved  Right neglect, improved  Cognitive impairment, continues  Dysphagia, resolved  Continue full rehab program  PT/OT/SLP, 1 hr each discipline, 5 days per week  Recreational therapy consult  Family training to be completed today 12/28      Statin currently on hold due to elevated liver enzymes  Not on antiplatelets due to hemorrhagic stroke    NG tube removed , diet advanced    Outpatient follow up with Dr. Murphy, stroke bridge clinic, referrals made    Hypertension  Amlodipine  Hydralazine  Lisinopril  Currently with good control    Hyperlipidemia  Statin discontinue due to elevated liver enzymes  Will restart after resolution     Transaminitis, improved  Recheck as an outpatient     Hypernatremia, resolved  Status post 1/2 normal saline  Appreciate hospitalist assistance     Leukocytosis, resolved  Negative UA and CXR     Hyperglycemia  Likely from tube feeds  SSI discontinued  HgbA1c 5.4    Bowel program  Continue bowel medications  Last BM 12/27    Bladder program  Check PVRs - 11, 1  Not retaining  Bladder scan for no voids  ICP for over 400 cc  Scheduled toileting    DVT prophylaxis  Contraindicated given hemorrhage.   Compression stockings on in am, off in pm.  Increase mobility. Monitor for swelling.    Total time:  25 minutes.  I spent greater than 50% of the time for patient care,  counseling, and coordination on this date, including patient face-to face time, unit/floor time with review of records/pertinent lab data and studies, as well as discussing diagnostic evaluation/work up, planned therapeutic interventions, and future disposition of care, as per the interval events/subjective and the assessment and plan as noted above and reviewing DC meds for tomorrow.       I have performed a physical exam, reviewed and updated ROS, as well as the assessment and plan today 12/28/2021. In review of note from 12/27/2021 there are no new changes except as documented above.              Cherelle Schmidt D.O.  Physical Medicine and Rehabilitation

## 2021-12-28 NOTE — THERAPY
Recreational Therapy  Daily Treatment     Patient Name: Maryjane Gray  AGE:  56 y.o., SEX:  female  Medical Record #: [id number]  Today's Date: 12/27/2021       Subjective         Objective       12/27/21 1301   Functional Ability Status - Cognitive   Attention Span Remains on Task   Comprehension Follows Two Step Commands   Judgment Able to Make Independent Decisions   Cognitive Comments Min verbal cues for cognitive leisure completion   Functional Ability Status - Emotional    Affect Appropriate;Bright   Mood Appropriate   Behavior Appropriate   Skilled Intervention    Skilled Intervention Cognitive Leisure;Gross Motor Leisure   Skilled Intervention Comments Standing dual tasking   Interdisciplinary Plan of Care Collaboration   Patient Position at End of Therapy Other (Comments)  (Mod I in room)   Strengths & Barriers   Strengths Able to follow instructions;Alert and oriented;Good balance;Good endurance;Motivated for self care and independence;Pleasant and cooperative;Supportive family;Willingly participates in therapeutic activities   Barriers Impaired functional cognition;Aphasia expressive   Treatment Time   Total Time Spent (mins) 30   Procedural Tracking   Procedural Tracking Community Re-Integration;Community Skills Development;Leisure Skills Awareness;Leisure Skills Development;Gross Motor Functional Leisure Skills;Cognitive Skills Training       Assessment    Pt stood for 15 minutes SBA and no LOB. Pt was putting together a 4 foot wide puzzle to work on weight shift and balance while dual tasking.    Strengths: (P) Able to follow instructions,Alert and oriented,Good balance,Good endurance,Motivated for self care and independence,Pleasant and cooperative,Supportive family,Willingly participates in therapeutic activities  Barriers: (P) Impaired functional cognition,Aphasia expressive    Plan    Post dc leisure.     Passport items to be completed:  Passport items to be completed:  Verbalize two positive  leisure activities, discuss returning to work, hobbies, community groups or volunteer activities, explore community resources

## 2021-12-29 VITALS
BODY MASS INDEX: 24.84 KG/M2 | TEMPERATURE: 97.8 F | HEIGHT: 64 IN | SYSTOLIC BLOOD PRESSURE: 99 MMHG | WEIGHT: 145.5 LBS | OXYGEN SATURATION: 98 % | RESPIRATION RATE: 18 BRPM | DIASTOLIC BLOOD PRESSURE: 65 MMHG | HEART RATE: 58 BPM

## 2021-12-29 PROCEDURE — A9270 NON-COVERED ITEM OR SERVICE: HCPCS | Performed by: PHYSICAL MEDICINE & REHABILITATION

## 2021-12-29 PROCEDURE — 99239 HOSP IP/OBS DSCHRG MGMT >30: CPT | Performed by: PHYSICAL MEDICINE & REHABILITATION

## 2021-12-29 PROCEDURE — 700102 HCHG RX REV CODE 250 W/ 637 OVERRIDE(OP): Performed by: PHYSICAL MEDICINE & REHABILITATION

## 2021-12-29 RX ADMIN — Medication 1000 UNITS: at 09:27

## 2021-12-29 RX ADMIN — HYDRALAZINE HYDROCHLORIDE 25 MG: 25 TABLET, FILM COATED ORAL at 05:16

## 2021-12-29 RX ADMIN — LISINOPRIL 15 MG: 5 TABLET ORAL at 05:16

## 2021-12-29 RX ADMIN — AMLODIPINE BESYLATE 10 MG: 5 TABLET ORAL at 05:15

## 2021-12-29 NOTE — CARE PLAN
Problem: Fall Risk - Rehab  Goal: Patient will remain free from falls  Outcome: Progressing  Note: Liz Almodovar Fall risk Assessment Score: 5    No risk  - Pt is independent in room with no assistive device.         Problem: Infection  Goal: Patient will remain free from infection  Outcome: Progressing  Note: Patient remains free from s/s infection; afebrile.  Will continue to monitor.

## 2021-12-29 NOTE — PROGRESS NOTES
Patient discharged to home per order.  Discharge instructions reviewed with patient and ; they verbalize understanding and signed copies placed in chart.  Patient has all belongings; signed copy of form in chart.  Patient left facility at 1000 accompanied by rehab staff and .  Have enjoyed working with this pleasant patient.

## 2021-12-29 NOTE — DISCHARGE INSTRUCTIONS
[facility] NURSING DISCHARGE INSTRUCTIONS    Blood Pressure: (!) 99/65  Weight: 66 kg (145 lb 8.1 oz)  Nursing recommendations for Maryjane Gray at time of discharge are as follows:  Client and Family Member verbalized understanding of all discharge instructions and prescriptions.     Review all your home medications and newly ordered medications with your doctor and/or pharmacist. Follow medication instructions as directed by your doctor and/or pharmacist.    Pain Management: N/A  Discharge Pain Medication Instructions: N/A  Comfort Goal: Comfort with Movement,Perform Activity,Stay Alert  Notify your primary care provider if pain is unrelieved with these measures, if the pain is new, or increased in intensity.    Discharge Skin Characteristics:  Warm, dry  Discharge Skin Exam:  Clear     Skin / Wound Care Instructions: Please contact your primary care physician for any change in skin integrity.     If You Have Surgical Incisions / Wounds:  Monitor surgical site(s) for signs of increased swelling, redness or symptoms of drainage from the site or fever as this could indicate signs and symptoms of infection. If these symptoms are noted, notifiy your primary care provider.    Discharge Safety Instructions: No Supervision Needed     Discharge Safety Concerns: No Concerns Noted  The interdisciplinary team has made recommendation that you do not require supervision in the house due to demonstration of safety with ADL's and IADLS and problem solving skills  Anti-embolic stockings are not required to increase circulation to the lower extremities.    Discharge Diet: Regular     Discharge Liquids: Thin  Discharge Bowel Function: Continent  Please contact your primary care physician for any changes in bowel habits.  Discharge Bowel Program: None  Discharge Bladder Function: Continent  Discharge Urinary Devices: None    Nursing Discharge Plan:   Influenza Vaccine Indication: Not indicated: Previously  immunized this influenza season and > 8 years of age      Case Management Discharge Instructions for Maryjane Gray   Discharge Location: Home  Agency Name/Address/Phone: [facility]: [phone]  Home Health: Occupational Therapist,Physical Therapist,Speech Therapist      Speech Therapy Discharge Instructions for Maryjane Gray  12/28/2021    Diet: Regular (7),Thin (0)    Aphasia is a communication impairment usually acquired as a result of a stroke or other brain injury. It affects both the ability to express oneself through speech, gesture, and writing, and to understand the speech, gesture, and writing of others. Aphasia thus changes the way in which we communicate with those people most important to us: family, friends, and co-workers. Make sure you have the person's attention before communicating.   1. During conversation, minimize or eliminate background noise (such as television, radio, other people) as much as possible.   2. Keep communication simple but adult. Simplify your own sentence structure and reduce your own rate of speech. You don't need to speak louder than normal but do emphasize key words. Don't talk down to the person with aphasia.   3. Encourage and use other modes of communication (writing, drawing, yes/no responses, choices, gestures, eye contact, facial expressions) in addition to speech. Use what seems to work consistently.  4. Give them time to talk and let them have a reasonable amount of time to respond. Avoid speaking for the person with aphasia except when necessary and ask permission before doing so.   5. Praise all attempts to speak; make speaking a pleasant experience and provide stimulating conversation. Downplay errors and avoid frequent criticisms/corrections. Avoid insisting that each word be produced perfectly.   6. Augment speech with gesture and visual aids whenever possible. Repeat a statement when necessary.   7. Encourage them to be as independent  as possible. Avoid being overprotective.   8. Whenever possible continue normal activities (such as dinner with family, company, going out). Do not shield people with aphasia from family or friends or ignore them in a group conversation. Rather, try to involve them in family decision-making as much as possible. Keep them informed of events but avoid burdening them with day to day details.   It was great working with you, best of luck! -MS Lela, CCC- SLP       Occupational Therapy Discharge Instructions for Maryjane Gray  12/28/2021    Level of Assist Required for Eating: Able to Complete Eating without Assist  Level of Assist Required for Grooming: Able to Complete Grooming without Assist  Level of Assist Required for Dressing: Able to Complete Dressing without Assist  Level of Assist Required for Toileting: Able to Complete Toileting without Assist  Level of Assist Required for Toilet Transfer: Able to Complete Toilet Transfer without Assist  Level of Assist Required for Bathing: Requires Supervision with Bathing  Equipment for Bathing: Tub Transfer Bench,Grab Bars in Tub / Shower  Level of Assist Required for Shower Transfer: Requires Supervision with Shower Transfer  Equipment for Shower Transfer: Tub Transfer Bench,Grab Bars in Tub / Shower  Level of Assist Required for Home Mgmt: Requires Supervision with Home Management  Level of Assist Required for Meal Prep: Requires Supervision with Meal Preparation  Driving: May not Drive, Please Contact Physician for Further Information  Home Exercise Program: Refer to Home Exercise Program Handout for Details  Comments: It was great working with you Maryjane! Enjoy being home with your family and keep up the good work. -Brie OT      Stroke Prevention  Some medical conditions and lifestyle choices can lead to a higher risk for a stroke. You can help to prevent a stroke by making nutrition, lifestyle, and other changes.  What nutrition changes can be made?    · Eat  healthy foods.  ? Choose foods that are high in fiber. These include:  § Fresh fruits.  § Fresh vegetables.  § Whole grains.  ? Eat at least 5 or more servings of fruits and vegetables each day. Try to fill half of your plate at each meal with fruits and vegetables.  ? Choose lean protein foods. These include:  § Lowfat (lean) cuts of meat.  § Chicken without skin.  § Fish.  § Tofu.  § Beans.  § Nuts.  ? Eat low-fat dairy products.  ? Avoid foods that:  § Are high in salt (sodium).  § Have saturated fat.  § Have trans fat.  § Have cholesterol.  § Are processed.  § Are premade.  · Follow eating guidelines as told by your doctor. These may include:  ? Reducing how many calories you eat and drink each day.  ? Limiting how much salt you eat or drink each day to 1,500 milligrams (mg).  ? Using only healthy fats for cooking. These include:  § Olive oil.  § Canola oil.  § Sunflower oil.  ? Counting how many carbohydrates you eat and drink each day.  What lifestyle changes can be made?  · Try to stay at a healthy weight. Talk to your doctor about what a good weight is for you.  · Get at least 30 minutes of moderate physical activity at least 5 days a week. This can include:  ? Fast walking.  ? Biking.  ? Swimming.  · Do not use any products that have nicotine or tobacco. This includes cigarettes and e-cigarettes. If you need help quitting, ask your doctor. Avoid being around tobacco smoke in general.  · Limit how much alcohol you drink to no more than 1 drink a day for nonpregnant women and 2 drinks a day for men. One drink equals 12 oz of beer, 5 oz of wine, or 1½ oz of hard liquor.  · Do not use drugs.  · Avoid taking birth control pills. Talk to your doctor about the risks of taking birth control pills if:  ? You are over 35 years old.  ? You smoke.  ? You get migraines.  ? You have had a blood clot.  What other changes can be made?  · Manage your cholesterol.  ? It is important to eat a healthy diet.  ? If your  "cholesterol cannot be managed through your diet, you may also need to take medicines. Take medicines as told by your doctor.  · Manage your diabetes.  ? It is important to eat a healthy diet and to exercise regularly.  ? If your blood sugar cannot be managed through diet and exercise, you may need to take medicines. Take medicines as told by your doctor.  · Control your high blood pressure (hypertension).  ? Try to keep your blood pressure below 130/80. This can help lower your risk of stroke.  ? It is important to eat a healthy diet and to exercise regularly.  ? If your blood pressure cannot be managed through diet and exercise, you may need to take medicines. Take medicines as told by your doctor.  ? Ask your doctor if you should check your blood pressure at home.  ? Have your blood pressure checked every year. Do this even if your blood pressure is normal.  · Talk to your doctor about getting checked for a sleep disorder. Signs of this can include:  ? Snoring a lot.  ? Feeling very tired.  · Take over-the-counter and prescription medicines only as told by your doctor. These may include aspirin or blood thinners (antiplatelets or anticoagulants).  · Make sure that any other medical conditions you have are managed.  Where to find more information  · American Stroke Association: www.strokeassociation.org  · National Stroke Association: www.stroke.org  Get help right away if:  · You have any symptoms of stroke. \"BE FAST\" is an easy way to remember the main warning signs:  ? B - Balance. Signs are dizziness, sudden trouble walking, or loss of balance.  ? E - Eyes. Signs are trouble seeing or a sudden change in how you see.  ? F - Face. Signs are sudden weakness or loss of feeling of the face, or the face or eyelid drooping on one side.  ? A - Arms. Signs are weakness or loss of feeling in an arm. This happens suddenly and usually on one side of the body.  ? S - Speech. Signs are sudden trouble speaking, slurred " speech, or trouble understanding what people say.  ? T - Time. Time to call emergency services. Write down what time symptoms started.  · You have other signs of stroke, such as:  ? A sudden, very bad headache with no known cause.  ? Feeling sick to your stomach (nausea).  ? Throwing up (vomiting).  ? Jerky movements you cannot control (seizure).  These symptoms may represent a serious problem that is an emergency. Do not wait to see if the symptoms will go away. Get medical help right away. Call your local emergency services (911 in the [place] Do not drive yourself to the hospital.  Summary  · You can prevent a stroke by eating healthy, exercising, not smoking, drinking less alcohol, and treating other health problems, such as diabetes, high blood pressure, or high cholesterol.  · Do not use any products that contain nicotine or tobacco, such as cigarettes and e-cigarettes.  · Get help right away if you have any signs or symptoms of a stroke.  This information is not intended to replace advice given to you by your health care provider. Make sure you discuss any questions you have with your health care provider.  Document Released: 06/18/2013 Document Revised: 02/13/2020 Document Reviewed: 03/21/2018  Elsevier Patient Education © 2020 Elsevier Inc.      Fall Prevention in the Home, Adult  Falls can cause injuries. They can happen to people of all ages. There are many things you can do to make your home safe and to help prevent falls. Ask for help when making these changes, if needed.  What actions can I take to prevent falls?  General Instructions  · Use good lighting in all rooms. Replace any light bulbs that burn out.  · Turn on the lights when you go into a dark area. Use night-lights.  · Keep items that you use often in easy-to-reach places. Lower the shelves around your home if necessary.  · Set up your furniture so you have a clear path. Avoid moving your furniture around.  · Do not have throw rugs and other  things on the floor that can make you trip.  · Avoid walking on wet floors.  · If any of your floors are uneven, fix them.  · Add color or contrast paint or tape to clearly macie and help you see:  ? Any grab bars or handrails.  ? First and last steps of stairways.  ? Where the edge of each step is.  · If you use a stepladder:  ? Make sure that it is fully opened. Do not climb a closed stepladder.  ? Make sure that both sides of the stepladder are locked into place.  ? Ask someone to hold the stepladder for you while you use it.  · If there are any pets around you, be aware of where they are.  What can I do in the bathroom?         · Keep the floor dry. Clean up any water that spills onto the floor as soon as it happens.  · Remove soap buildup in the tub or shower regularly.  · Use non-skid mats or decals on the floor of the tub or shower.  · Attach bath mats securely with double-sided, non-slip rug tape.  · If you need to sit down in the shower, use a plastic, non-slip stool.  · Install grab bars by the toilet and in the tub and shower. Do not use towel bars as grab bars.  What can I do in the bedroom?  · Make sure that you have a light by your bed that is easy to reach.  · Do not use any sheets or blankets that are too big for your bed. They should not hang down onto the floor.  · Have a firm chair that has side arms. You can use this for support while you get dressed.  What can I do in the kitchen?  · Clean up any spills right away.  · If you need to reach something above you, use a strong step stool that has a grab bar.  · Keep electrical cords out of the way.  · Do not use floor polish or wax that makes floors slippery. If you must use wax, use non-skid floor wax.  What can I do with my stairs?  · Do not leave any items on the stairs.  · Make sure that you have a light switch at the top of the stairs and the bottom of the stairs. If you do not have them, ask someone to add them for you.  · Make sure that there  are handrails on both sides of the stairs, and use them. Fix handrails that are broken or loose. Make sure that handrails are as long as the stairways.  · Install non-slip stair treads on all stairs in your home.  · Avoid having throw rugs at the top or bottom of the stairs. If you do have throw rugs, attach them to the floor with carpet tape.  · Choose a carpet that does not hide the edge of the steps on the stairway.  · Check any carpeting to make sure that it is firmly attached to the stairs. Fix any carpet that is loose or worn.  What can I do on the outside of my home?  · Use bright outdoor lighting.  · Regularly fix the edges of walkways and driveways and fix any cracks.  · Remove anything that might make you trip as you walk through a door, such as a raised step or threshold.  · Trim any bushes or trees on the path to your home.  · Regularly check to see if handrails are loose or broken. Make sure that both sides of any steps have handrails.  · Install guardrails along the edges of any raised decks and porches.  · Clear walking paths of anything that might make someone trip, such as tools or rocks.  · Have any leaves, snow, or ice cleared regularly.  · Use sand or salt on walking paths during winter.  · Clean up any spills in your garage right away. This includes grease or oil spills.  What other actions can I take?  · Wear shoes that:  ? Have a low heel. Do not wear high heels.  ? Have rubber bottoms.  ? Are comfortable and fit you well.  ? Are closed at the toe. Do not wear open-toe sandals.  · Use tools that help you move around (mobility aids) if they are needed. These include:  ? Canes.  ? Walkers.  ? Scooters.  ? Crutches.  · Review your medicines with your doctor. Some medicines can make you feel dizzy. This can increase your chance of falling.  Ask your doctor what other things you can do to help prevent falls.  Where to find more information  · Centers for Disease Control and PreventionCASSIE:  https://cdc.gov  · National [place] on Aging: https://dm4xlwa.marilee.nih.gov  Contact a doctor if:  · You are afraid of falling at home.  · You feel weak, drowsy, or dizzy at home.  · You fall at home.  Summary  · There are many simple things that you can do to make your home safe and to help prevent falls.  · Ways to make your home safe include removing tripping hazards and installing grab bars in the bathroom.  · Ask for help when making these changes in your home.  This information is not intended to replace advice given to you by your health care provider. Make sure you discuss any questions you have with your health care provider.  Document Released: 10/14/2010 Document Revised: 04/09/2020 Document Reviewed: 08/02/2018  Elsevier Patient Education © 2020 [employer]      Suicidal Feelings: How to Help Yourself  Suicide is when you end your own life. There are many things you can do to help yourself feel better when struggling with these feelings. Many services and people are available to support you and others who struggle with similar feelings.   If you ever feel like you may hurt yourself or others, or have thoughts about taking your own life, get help right away. To get help:  · Call your local emergency services (911 in the [place]  · The [facility] health and human services helpline (211 in the [place]  · Go to your nearest emergency department.  · Call a suicide hotline to speak with a trained counselor. The following suicide hotlines are available in the [place]:  ? 9-128-576-TALK (1-716.587.5576).  ? 3-418-QSVWYTU (1-185.856.7639).  ? 1-277.111.9569. This is a hotline for Upper sorbian speakers.  ? 1-891.101.2804. This is a hotline for TTY users.  ? 9-010-7-U-VENUS (1-221.769.8769). This is a hotline for lesbian, morales, bisexual, transgender, or questioning youth.  ? For a list of hotlines in [place], visit www.suicide.org/hotlines/international/hnediv-fxbpcex-krkqzuhm.html  · Contact a crisis center or a  local suicide prevention center. To find a crisis center or suicide prevention center:  ? Call your local hospital, clinic, community service organization, mental health center, social service provider, or health department. Ask for help with connecting to a crisis center.  ? For a list of crisis centers in the [place], visit: suicidepreventionlifeline.org  ? For a list of crisis centers in [place], visit: suicideprevention.ca  How to help yourself feel better    · Promise yourself that you will not do anything extreme when you have suicidal feelings. Remember, there is hope. Many people have gotten through suicidal thoughts and feelings, and you can too. If you have had these feelings before, remind yourself that you can get through them again.  · Let family, friends, teachers, or counselors know how you are feeling. Try not to separate yourself from those who care about you and want to help you. Talk with someone every day, even if you do not feel sociable. Face-to-face conversation is best to help them understand your feelings.  · Contact a mental health care provider and work with this person regularly.  · Make a safety plan that you can follow during a crisis. Include phone numbers of suicide prevention hotlines, mental health professionals, and trusted friends and family members you can call during an emergency. Save these numbers on your phone.  · If you are thinking of taking a lot of medicine, give your medicine to someone who can give it to you as prescribed. If you are on antidepressants and are concerned you will overdose, tell your health care provider so that he or she can give you safer medicines.  · Try to stick to your routines. Follow a schedule every day. Make self-care a priority.  · Make a list of realistic goals, and cross them off when you achieve them. Accomplishments can give you a sense of worth.  · Wait until you are feeling better before doing things that you find difficult or  unpleasant.  · Do things that you have always enjoyed to take your mind off your feelings. Try reading a book, or listening to or playing music. Spending time outside, in nature, may help you feel better.  Follow these instructions at home:    · Visit your primary health care provider every year for a checkup.  · Work with a mental health care provider as needed.  · Eat a well-balanced diet, and eat regular meals.  · Get plenty of rest.  · Exercise if you are able. Just 30 minutes of exercise each day can help you feel better.  · Take over-the-counter and prescription medicines only as told by your health care provider. Ask your mental health care provider about the possible side effects of any medicines you are taking.  · Do not use alcohol or drugs, and remove these substances from your home.  · Remove weapons, poisons, knives, and other deadly items from your home.  General recommendations  · Keep your living space well lit.  · When you are feeling well, write yourself a letter with tips and support that you can read when you are not feeling well.  · Remember that life's difficulties can be sorted out with help. Conditions can be treated, and you can learn behaviors and ways of thinking that will help you.  Where to find more information  · National Suicide Prevention Lifeline: www.suicidepreventionlifeline.org  · Hopeline: www.hopeline.com  · American Foundation for Suicide Prevention: www.afsp.org  · The [place] Project (for lesbian, morales, bisexual, transgender, or questioning youth): www.thetrevorproject.org  Contact a health care provider if:  · You feel as though you are a burden to others.  · You feel agitated, angry, vengeful, or have extreme mood swings.  · You have withdrawn from family and friends.  Get help right away if:  · You are talking about suicide or wishing to die.  · You start making plans for how to commit suicide.  · You feel that you have no reason to live.  · You start making plans for  putting your affairs in order, saying goodbye, or giving your possessions away.  · You feel guilt, shame, or unbearable pain, and it seems like there is no way out.  · You are frequently using drugs or alcohol.  · You are engaging in risky behaviors that could lead to death.  If you have any of these symptoms, get help right away. Call emergency services, go to your nearest emergency department or crisis center, or call a suicide crisis helpline.  Summary  · Suicide is when you take your own life.  · Promise yourself that you will not do anything extreme when you have suicidal feelings.  · Let family, friends, teachers, or counselors know how you are feeling.  · Get help right away if you feel as though life is getting too tough to handle and you are thinking about suicide.  This information is not intended to replace advice given to you by your health care provider. Make sure you discuss any questions you have with your health care provider.  Document Released: 06/23/2004 Document Revised: 04/09/2020 Document Reviewed: 07/31/2018  Elsevier Patient Education © 2020 [employer] Inc.

## 2021-12-29 NOTE — CARE PLAN
Problem: Recreation Therapy  Goal: STG-Within one week, patient will demonstrate leisure problem solving by sharing on two positive lesiure activities they would like to participate in either in session or during their free time and any perceived barriers to their participation.  Outcome: Met  Goal: STG-Within one week, patient will demonstrate a standing tolerance during a dual task activity with BUE while standing for 2 minutes x3 CGA and no LOB.   Outcome: Met  Goal: LTG-By discharge, patient will demonstrate leisure problem solving by sharing on two positive lesiure activities they would like to participate in post dc and any perceived barriers to their participation.  Outcome: Met  Goal: LTG-By discharge, patient will demonstrate a standing tolerance during a dual task activity with BUE while standing for 3 minutes x3 CGA and no LOB.   Outcome: Met

## 2021-12-29 NOTE — DISCHARGE SUMMARY
"Rehab Discharge Summary    Admission Date: 12/17/2021    Discharge Date: 12/29/2021    Attending Provider: Cherelle Schmidt DO     Admission Diagnosis:   Active Hospital Problems    Diagnosis    • *Hemorrhagic stroke (HCC)    • Azotemia    • Transaminitis    • Low grade fever    • Hypernatremia    • Hypokalemia    • Dysphagia    • Other hyperlipidemia    • Vitamin D deficiency    • Impaired mobility and ADLs    • Hyperglycemia    • Leukocytosis    • Primary hypertension        Discharge Diagnosis:  Active Hospital Problems    Diagnosis    • *Hemorrhagic stroke (HCC)    • Azotemia    • Transaminitis    • Low grade fever    • Hypernatremia    • Hypokalemia    • Dysphagia    • Other hyperlipidemia    • Vitamin D deficiency    • Impaired mobility and ADLs    • Hyperglycemia    • Leukocytosis    • Primary hypertension        HPI per H&P:  Per Dr. Schmidt consult \"The patient is a 56 y.o.  female with unknown past medical history (patient aphasic and unable to list PMHx);  who presented on 12/14/2021  7:18 AM with acute onset right-sided weakness.  Per documentation, patient originally presented to [facility] emergency department after abrupt onset right-sided weakness.  Upon presentation to [facility] reportedly patient's blood pressure was > 180 at [facility] CT head was obtained which showed a 3 to 5 cm left subcortical hemorrhage land patient was transferred to [facility] for higher level of care.  Neurology was consulted, etiology of hemorrhage appeared to be hypertensive in nature.  Suspected aphasia was secondary to hemorrhage proximity to the cortex.  CT head images reviewed by neurology and there is no significant mass-effect or edema.  Patient was admitted to the ICU, placed on nicardipine drip with BP goals of less than 160/9.  Repeat CT head is pending, PT/OT evaluations pending.  Patient has been seen by SLP and is currently n.p.o. with core track in place.\"     HgbA1c 5.6, , hypernatremia " 151.  Patient continues with NG tube due to her dysphagia.    Patient was admitted to [facility] on 12/17/2021.     Hospital Course by Problem List:  Left subcortical MCA hemorrhagic stroke  Right hemiparesis, improved  Right sided incoordination, improved  Right neglect, improved  Cognitive impairment, continues  Dysphagia, resolved  Continue full rehab program  PT/OT/SLP, 1 hr each discipline, 5 days per week  Recreational therapy consult  Family training to be completed today 12/28      Statin currently on hold due to elevated liver enzymes  Not on antiplatelets due to hemorrhagic stroke     NG tube removed , diet advanced     Outpatient follow up with Dr. Murphy, stroke bridge clinic, referrals made     Hypertension  Amlodipine  Hydralazine  Lisinopril  Currently with good control     Hyperlipidemia  Statin discontinue due to elevated liver enzymes  Will restart after resolution     Transaminitis, improved  Recheck as an outpatient     Hypernatremia, resolved  Status post 1/2 normal saline  Appreciate hospitalist assistance     Leukocytosis, resolved  Negative UA and CXR     Hyperglycemia  Likely from tube feeds  SSI discontinued  HgbA1c 5.4     Bowel program  Continue bowel medications  Last BM 12/27     Bladder program  Check PVRs - 11, 1  Not retaining  Bladder scan for no voids  ICP for over 400 cc  Scheduled toileting     DVT prophylaxis  Contraindicated given hemorrhage.   Compression stockings on in am, off in pm.  Increase mobility. Monitor for swelling.    Functional Status at Discharge  Eating:  Independent  Eating Description:  Staff administers tube feed/parenteral nutrition/IVF,Tube feed bolus  Grooming:  Independent,Standing  Grooming Description:  Standing at sink  Bathing:  Contact Guard Assist  Bathing Description:  Grab bar,Hand held shower,Supervision for safety,Verbal cueing (CGA standing, completed primarily in sitting)  Upper Body Dressing:  Modified Independent  Upper  Body Dressing Description:  Increased time  Lower Body Dressing:  Modified Independent  Lower Body Dressing Description:  Increased time  Discharge Location : Home  Patient Discharging with Assist of: Family   Level of Supervision Required: Intermittent Supervision  Recommended Services Upon Discharge: Home Health Occupational Therapy  Long Term Goals Met: 3  Long Term Goals Not Met: 0  Criteria for Termination of Services: Maximum Function Achieved for Inpatient Rehabilitation  Walk:  Supervised  Distance Walked:  250  Number of Times Distance Was Traveled:  3  Assistive Device:  None  Gait Deviation:  Other (Comment) (decreased foot clearance on R with further distance)  Wheelchair:   (n/a)  Distance Propelled:   (n/a)   Wheelchair Description:   (n/a)  Stairs Modified Independent  Stairs Description  (1 rail, reciprocal pattern)     Comprehension:  Modified Independent  Comprehension Description:  Glasses  Expression:  Minimal Assist  Expression Description:  Verbal cueing  Social Interaction:  Independent  Social Interaction Description:     Problem Solving:  Minimal Assist ( as related to cooking activity performed )  Problem Solving Description:  Increased time,Therapy schedule,Verbal cueing  Memory:  Minimal Assist ( as related to cooking activity performed )  Memory Description:  Increased time,Therapy schedule,Verbal cueing  Progress since Admit: Patient appears to tolerate regular diet with thin liquids, has been discharged from [facility]. Continues to demonstrate moderately interfering word-finding deficts. Difficutlies noted in memory, attention, and executive function tasks.  Discharge Location : Home  Patient Discharging with Assist of: Spouse / Significant Other  Level of Supervision Required: Intermittent Supervision  Recommended Services Upon Discharge: Home Health Speech Therapy  Long Term Goals Met: 1/2  Long Term Goals Not Met: Mod I for safe d/c home  Reason(s) for Goals Not Met: Patient would  benefit from intermittent supervision as well as assist for IADL's  Criteria for Termination of Services: Maximum Function Achieved for Inpatient Rehabilitation    Cherelle CHOUDHURY D.O., personally performed a complete drug regimen review and no potential clinically significant medication issues were identified.   Discharge Medication:     Medication List      START taking these medications      Instructions   vitamin D 1000 UNIT Tabs  Commonly known as: VITAMIND D3  Next Dose Due: 12/30 at 9:00 am  Notes to patient: Supplement   Take 1 Tablet by mouth every day.  Dose: 1,000 Units        CHANGE how you take these medications      Instructions   amLODIPine 10 MG Tabs  What changed: how to take this  Commonly known as: NORVASC  Next Dose Due: 12/30 at 6:00 am  Notes to patient: Blood pressure   Take 1 Tablet by mouth every day.  Dose: 10 mg     hydrALAZINE 25 MG Tabs  What changed:   · how to take this  · when to take this  · Another medication with the same name was removed. Continue taking this medication, and follow the directions you see here.  Commonly known as: APRESOLINE  Next Dose Due: 12/29 at 2:00 pm  Notes to patient: Blood pressure   Take 1 Tablet by mouth every 8 hours.  Dose: 25 mg     lisinopril 5 MG Tabs  What changed:   · medication strength  · how much to take  · how to take this  Commonly known as: PRINIVIL  Next Dose Due: 12/29 at 6:00 pm  Notes to patient: Blood pressure   Take 3 Tablets by mouth 2 times a day.  Dose: 15 mg        STOP taking these medications    acetaminophen 325 MG Tabs  Commonly known as: Tylenol     labetalol 5 MG/ML Soln  Commonly known as: NORMODYNE/TRANDATE            Discharge Diet:  Regular with thins     Discharge Activity:  Ambulation at supervision level without AD.   Do not return to work or driving until cleared by PCP.     Disposition:  Patient to discharge home with family support and community resources.     Equipment:  Shower chair     Follow-up & Discharge  Instructions:  Follow up with your primary care provider (PCP) within 7-10 days of discharge to review your medications and take over your care.   Follow up with Dr. Murphy for outpatient PM&R needs.     If you develop chest pain, fever, chills, change in neurologic function (weakness, sensation changes, vision changes), or other concerning sxs, seek immediate medical attention or call 911.      Condition on Discharge:  Good    More than 35 minutes was spent on discharging this patient, including face-to-face time, prescription management, and the dictation of this note.    Cherelle Schmidt D.O.    Date of Service: 12/29/2021

## 2021-12-29 NOTE — CARE PLAN
The patient is Stable - Low risk of patient condition declining or worsening    Shift Goals  Clinical Goals: safety  Patient Goals:     Problem: Fall Risk - Rehab  Goal: Patient will remain free from falls  Note: Pt uses call light consistently and appropriately. Mod I in the room, reminded to call for assistance as needed.     Problem: Pain - Standard  Goal: Alleviation of pain or a reduction in pain to the patient’s comfort goal  Outcome: Progressing  Note: Patient able to verbalize needs.  Denies pain or discomfort this shift and no s/s same noted.  Will continue to monitor.       Anticipating DC home today. VSS. Sleeping during rounds. No C/O.

## 2021-12-31 NOTE — DISCHARGE PLANNING
"Referral was faxed two days ago to [facility] OP therapy through [employer].  Didn't go through.  Refaxed yesterday.  TC from patients  Art.  He called [facility] and was told \"still no referral\".  RAMEZ faxed referral a third time via regular fax to number [phone] (per ).  Fax confirmation rec'd.  Let Art know.  He will call [facility] in the am.  RAMEZ also reached out to [facility] To see if a ST could call Art tomorrow to give him some ideas for home program until patient can get into OP therapy.  Art stated that was the only area that he feels he needs a little more instruction on.    "

## 2022-01-10 ENCOUNTER — OFFICE VISIT (OUTPATIENT)
Dept: NEUROLOGY | Facility: MEDICAL CENTER | Age: 57
End: 2022-01-10
Attending: NURSE PRACTITIONER
Payer: COMMERCIAL

## 2022-01-10 VITALS
WEIGHT: 142.42 LBS | BODY MASS INDEX: 24.31 KG/M2 | DIASTOLIC BLOOD PRESSURE: 58 MMHG | HEIGHT: 64 IN | SYSTOLIC BLOOD PRESSURE: 110 MMHG | HEART RATE: 71 BPM | RESPIRATION RATE: 12 BRPM | TEMPERATURE: 97.8 F | OXYGEN SATURATION: 97 %

## 2022-01-10 DIAGNOSIS — I10 PRIMARY HYPERTENSION: ICD-10-CM

## 2022-01-10 DIAGNOSIS — I62.9 INTRACRANIAL HEMORRHAGE (HCC): ICD-10-CM

## 2022-01-10 DIAGNOSIS — E55.9 VITAMIN D DEFICIENCY: ICD-10-CM

## 2022-01-10 DIAGNOSIS — E78.2 MIXED HYPERLIPIDEMIA: ICD-10-CM

## 2022-01-10 PROCEDURE — 99215 OFFICE O/P EST HI 40 MIN: CPT | Performed by: NURSE PRACTITIONER

## 2022-01-10 RX ORDER — HYDRALAZINE HYDROCHLORIDE 25 MG/1
25 TABLET, FILM COATED ORAL EVERY 8 HOURS
Qty: 90 TABLET | Refills: 0 | Status: SHIPPED | OUTPATIENT
Start: 2022-01-10 | End: 2022-02-09

## 2022-01-10 RX ORDER — LISINOPRIL 5 MG/1
15 TABLET ORAL 2 TIMES DAILY
Qty: 180 TABLET | Refills: 0 | Status: SHIPPED | OUTPATIENT
Start: 2022-01-10 | End: 2022-02-09

## 2022-01-10 RX ORDER — AMLODIPINE BESYLATE 10 MG/1
10 TABLET ORAL DAILY
Qty: 30 TABLET | Refills: 0 | Status: SHIPPED | OUTPATIENT
Start: 2022-01-10 | End: 2022-02-09

## 2022-01-10 RX ORDER — ROSUVASTATIN CALCIUM 10 MG/1
10 TABLET, COATED ORAL EVERY EVENING
Qty: 30 TABLET | Refills: 1 | Status: SHIPPED | OUTPATIENT
Start: 2022-01-10 | End: 2023-01-10

## 2022-01-10 ASSESSMENT — ENCOUNTER SYMPTOMS
SPEECH CHANGE: 1
SENSORY CHANGE: 0
DIZZINESS: 0
HEADACHES: 0
DIARRHEA: 0
FOCAL WEAKNESS: 1
COUGH: 0
NAUSEA: 0
SHORTNESS OF BREATH: 0
BRUISES/BLEEDS EASILY: 1
BACK PAIN: 0
SINUS PAIN: 0
NERVOUS/ANXIOUS: 0
CONSTIPATION: 0
BLURRED VISION: 0
VOMITING: 0
NECK PAIN: 0
TINGLING: 0
DEPRESSION: 0
HEARTBURN: 0

## 2022-01-10 ASSESSMENT — PATIENT HEALTH QUESTIONNAIRE - PHQ9: CLINICAL INTERPRETATION OF PHQ2 SCORE: 0

## 2022-01-10 ASSESSMENT — FIBROSIS 4 INDEX: FIB4 SCORE: 1.15

## 2022-01-10 NOTE — PATIENT INSTRUCTIONS
"If any new signs of stroke:  sudden weakness, numbness, speech difficulty (slurring or difficulty finding words), imbalance, incoordination, worse headache of life or vision loss occur, call 911.      Take all medications as prescribed unless instructed by your provider.              High Cholesterol    High cholesterol is a condition in which the blood has high levels of a white, waxy, fat-like substance (cholesterol). The human body needs small amounts of cholesterol. The liver makes all the cholesterol that the body needs. Extra (excess) cholesterol comes from the food that we eat.  Cholesterol is carried from the liver by the blood through the blood vessels. If you have high cholesterol, deposits (plaques) may build up on the walls of your blood vessels (arteries). Plaques make the arteries narrower and stiffer. Cholesterol plaques increase your risk for heart attack and stroke. Work with your health care provider to keep your cholesterol levels in a healthy range.  What increases the risk?  This condition is more likely to develop in people who:  · Eat foods that are high in animal fat (saturated fat) or cholesterol.  · Are overweight.  · Are not getting enough exercise.  · Have a family history of high cholesterol.  What are the signs or symptoms?  There are no symptoms of this condition.  How is this diagnosed?  This condition may be diagnosed from the results of a blood test.  · If you are older than age 20, your health care provider may check your cholesterol every 4-6 years.  · You may be checked more often if you already have high cholesterol or other risk factors for heart disease.  The blood test for cholesterol measures:  · \"Bad\" cholesterol (LDL cholesterol). This is the main type of cholesterol that causes heart disease. The desired level for LDL is less than 100.  · \"Good\" cholesterol (HDL cholesterol). This type helps to protect against heart disease by cleaning the arteries and carrying the LDL " away. The desired level for HDL is 60 or higher.  · Triglycerides. These are fats that the body can store or burn for energy. The desired number for triglycerides is lower than 150.  · Total cholesterol. This is a measure of the total amount of cholesterol in your blood, including LDL cholesterol, HDL cholesterol, and triglycerides. A healthy number is less than 200.  How is this treated?  This condition is treated with diet changes, lifestyle changes, and medicines.  Diet changes  · This may include eating more whole grains, fruits, vegetables, nuts, and fish.  · This may also include cutting back on red meat and foods that have a lot of added sugar.  Lifestyle changes  · Changes may include getting at least 40 minutes of aerobic exercise 3 times a week. Aerobic exercises include walking, biking, and swimming. Aerobic exercise along with a healthy diet can help you maintain a healthy weight.  · Changes may also include quitting smoking.  Medicines  · Medicines are usually given if diet and lifestyle changes have failed to reduce your cholesterol to healthy levels.  · Your health care provider may prescribe a statin medicine. Statin medicines have been shown to reduce cholesterol, which can reduce the risk of heart disease.  Follow these instructions at home:  Eating and drinking  If told by your health care provider:  · Eat chicken (without skin), fish, veal, shellfish, ground turkey breast, and round or loin cuts of red meat.  · Do not eat fried foods or fatty meats, such as hot dogs and salami.  · Eat plenty of fruits, such as apples.  · Eat plenty of vegetables, such as broccoli, potatoes, and carrots.  · Eat beans, peas, and lentils.  · Eat grains such as barley, rice, couscous, and bulgur wheat.  · Eat pasta without cream sauces.  · Use skim or nonfat milk, and eat low-fat or nonfat yogurt and cheeses.  · Do not eat or drink whole milk, cream, ice cream, egg yolks, or hard cheeses.  · Do not eat stick  margarine or tub margarines that contain trans fats (also called partially hydrogenated oils).  · Do not eat saturated tropical oils, such as coconut oil and palm oil.  · Do not eat cakes, cookies, crackers, or other baked goods that contain trans fats.    General instructions  · Exercise as directed by your health care provider. Increase your activity level with activities such as gardening, walking, and taking the stairs.  · Take over-the-counter and prescription medicines only as told by your health care provider.  · Do not use any products that contain nicotine or tobacco, such as cigarettes and e-cigarettes. If you need help quitting, ask your health care provider.  · Keep all follow-up visits as told by your health care provider. This is important.  Contact a health care provider if:  · You are struggling to maintain a healthy diet or weight.  · You need help to start on an exercise program.  · You need help to stop smoking.  Get help right away if:  · You have chest pain.  · You have trouble breathing.  This information is not intended to replace advice given to you by your health care provider. Make sure you discuss any questions you have with your health care provider.  Document Released: 12/18/2006 Document Revised: 12/21/2018 Document Reviewed: 06/17/2017  [email or web address] Patient Education © 2020 [employer]  Hypertension, Adult  Hypertension is another name for high blood pressure. High blood pressure forces your heart to work harder to pump blood. This can cause problems over time.  There are two numbers in a blood pressure reading. There is a top number (systolic) over a bottom number (diastolic). It is best to have a blood pressure that is below 120/80. Healthy choices can help lower your blood pressure, or you may need medicine to help lower it.  What are the causes?  The cause of this condition is not known. Some conditions may be related to high blood pressure.  What increases the  risk?  · Smoking.  · Having type 2 diabetes mellitus, high cholesterol, or both.  · Not getting enough exercise or physical activity.  · Being overweight.  · Having too much fat, sugar, calories, or salt (sodium) in your diet.  · Drinking too much alcohol.  · Having long-term (chronic) kidney disease.  · Having a family history of high blood pressure.  · Age. Risk increases with age.  · Race. You may be at higher risk if you are .  · Gender. Men are at higher risk than women before age 45. After age 65, women are at higher risk than men.  · Having obstructive sleep apnea.  · Stress.  What are the signs or symptoms?  · High blood pressure may not cause symptoms. Very high blood pressure (hypertensive crisis) may cause:  ? Headache.  ? Feelings of worry or nervousness (anxiety).  ? Shortness of breath.  ? Nosebleed.  ? A feeling of being sick to your stomach (nausea).  ? Throwing up (vomiting).  ? Changes in how you see.  ? Very bad chest pain.  ? Seizures.  How is this treated?  · This condition is treated by making healthy lifestyle changes, such as:  ? Eating healthy foods.  ? Exercising more.  ? Drinking less alcohol.  · Your health care provider may prescribe medicine if lifestyle changes are not enough to get your blood pressure under control, and if:  ? Your top number is above 130.  ? Your bottom number is above 80.  · Your personal target blood pressure may vary.  Follow these instructions at home:  Eating and drinking    · If told, follow the DASH eating plan. To follow this plan:  ? Fill one half of your plate at each meal with fruits and vegetables.  ? Fill one fourth of your plate at each meal with whole grains. Whole grains include whole-wheat pasta, brown rice, and whole-grain bread.  ? Eat or drink low-fat dairy products, such as skim milk or low-fat yogurt.  ? Fill one fourth of your plate at each meal with low-fat (lean) proteins. Low-fat proteins include fish, chicken without skin,  eggs, beans, and tofu.  ? Avoid fatty meat, cured and processed meat, or chicken with skin.  ? Avoid pre-made or processed food.  · Eat less than 1,500 mg of salt each day.  · Do not drink alcohol if:  ? Your doctor tells you not to drink.  ? You are pregnant, may be pregnant, or are planning to become pregnant.  · If you drink alcohol:  ? Limit how much you use to:  § 0-1 drink a day for women.  § 0-2 drinks a day for men.  ? Be aware of how much alcohol is in your drink. In the [place] one drink equals one 12 oz bottle of beer (355 mL), one 5 oz glass of wine (148 mL), or one 1½ oz glass of hard liquor (44 mL).  Lifestyle    · Work with your doctor to stay at a healthy weight or to lose weight. Ask your doctor what the best weight is for you.  · Get at least 30 minutes of exercise most days of the week. This may include walking, swimming, or biking.  · Get at least 30 minutes of exercise that strengthens your muscles (resistance exercise) at least 3 days a week. This may include lifting weights or doing Pilates.  · Do not use any products that contain nicotine or tobacco, such as cigarettes, e-cigarettes, and chewing tobacco. If you need help quitting, ask your doctor.  · Check your blood pressure at home as told by your doctor.  · Keep all follow-up visits as told by your doctor. This is important.  Medicines  · Take over-the-counter and prescription medicines only as told by your doctor. Follow directions carefully.  · Do not skip doses of blood pressure medicine. The medicine does not work as well if you skip doses. Skipping doses also puts you at risk for problems.  · Ask your doctor about side effects or reactions to medicines that you should watch for.  Contact a doctor if you:  · Think you are having a reaction to the medicine you are taking.  · Have headaches that keep coming back (recurring).  · Feel dizzy.  · Have swelling in your ankles.  · Have trouble with your vision.  Get help right away if  you:  · Get a very bad headache.  · Start to feel mixed up (confused).  · Feel weak or numb.  · Feel faint.  · Have very bad pain in your:  ? Chest.  ? Belly (abdomen).  · Throw up more than once.  · Have trouble breathing.  Summary  · Hypertension is another name for high blood pressure.  · High blood pressure forces your heart to work harder to pump blood.  · For most people, a normal blood pressure is less than 120/80.  · Making healthy choices can help lower blood pressure. If your blood pressure does not get lower with healthy choices, you may need to take medicine.  This information is not intended to replace advice given to you by your health care provider. Make sure you discuss any questions you have with your health care provider.  Document Released: 06/05/2009 Document Revised: 08/28/2019 Document Reviewed: 08/28/2019  [employer] Patient Education © 2020 [employer]        Stroke Prevention  Some medical conditions and lifestyle choices can lead to a higher risk for a stroke. You can help to prevent a stroke by making nutrition, lifestyle, and other changes.  What nutrition changes can be made?    · Eat healthy foods.  ? Choose foods that are high in fiber. These include:  § Fresh fruits.  § Fresh vegetables.  § Whole grains.  ? Eat at least 5 or more servings of fruits and vegetables each day. Try to fill half of your plate at each meal with fruits and vegetables.  ? Choose lean protein foods. These include:  § Lowfat (lean) cuts of meat.  § Chicken without skin.  § Fish.  § Tofu.  § Beans.  § Nuts.  ? Eat low-fat dairy products.  ? Avoid foods that:  § Are high in salt (sodium).  § Have saturated fat.  § Have trans fat.  § Have cholesterol.  § Are processed.  § Are premade.  · Follow eating guidelines as told by your doctor. These may include:  ? Reducing how many calories you eat and drink each day.  ? Limiting how much salt you eat or drink each day to 1,500 milligrams (mg).  ? Using only healthy fats  for cooking. These include:  § Olive oil.  § Canola oil.  § Sunflower oil.  ? Counting how many carbohydrates you eat and drink each day.  What lifestyle changes can be made?  · Try to stay at a healthy weight. Talk to your doctor about what a good weight is for you.  · Get at least 30 minutes of moderate physical activity at least 5 days a week. This can include:  ? Fast walking.  ? Biking.  ? Swimming.  · Do not use any products that have nicotine or tobacco. This includes cigarettes and e-cigarettes. If you need help quitting, ask your doctor. Avoid being around tobacco smoke in general.  · Limit how much alcohol you drink to no more than 1 drink a day for nonpregnant women and 2 drinks a day for men. One drink equals 12 oz of beer, 5 oz of wine, or 1½ oz of hard liquor.  · Do not use drugs.  · Avoid taking birth control pills. Talk to your doctor about the risks of taking birth control pills if:  ? You are over 35 years old.  ? You smoke.  ? You get migraines.  ? You have had a blood clot.  What other changes can be made?  · Manage your cholesterol.  ? It is important to eat a healthy diet.  ? If your cholesterol cannot be managed through your diet, you may also need to take medicines. Take medicines as told by your doctor.  · Manage your diabetes.  ? It is important to eat a healthy diet and to exercise regularly.  ? If your blood sugar cannot be managed through diet and exercise, you may need to take medicines. Take medicines as told by your doctor.  · Control your high blood pressure (hypertension).  ? Try to keep your blood pressure below 130/80. This can help lower your risk of stroke.  ? It is important to eat a healthy diet and to exercise regularly.  ? If your blood pressure cannot be managed through diet and exercise, you may need to take medicines. Take medicines as told by your doctor.  ? Ask your doctor if you should check your blood pressure at home.  ? Have your blood pressure checked every year.  "Do this even if your blood pressure is normal.  · Talk to your doctor about getting checked for a sleep disorder. Signs of this can include:  ? Snoring a lot.  ? Feeling very tired.  · Take over-the-counter and prescription medicines only as told by your doctor. These may include aspirin or blood thinners (antiplatelets or anticoagulants).  · Make sure that any other medical conditions you have are managed.  Where to find more information  · American Stroke Association: www.strokeassociation.org  · National Stroke Association: www.stroke.org  Get help right away if:  · You have any symptoms of stroke. \"BE FAST\" is an easy way to remember the main warning signs:  ? B - Balance. Signs are dizziness, sudden trouble walking, or loss of balance.  ? E - Eyes. Signs are trouble seeing or a sudden change in how you see.  ? F - Face. Signs are sudden weakness or loss of feeling of the face, or the face or eyelid drooping on one side.  ? A - Arms. Signs are weakness or loss of feeling in an arm. This happens suddenly and usually on one side of the body.  ? S - Speech. Signs are sudden trouble speaking, slurred speech, or trouble understanding what people say.  ? T - Time. Time to call emergency services. Write down what time symptoms started.  · You have other signs of stroke, such as:  ? A sudden, very bad headache with no known cause.  ? Feeling sick to your stomach (nausea).  ? Throwing up (vomiting).  ? Jerky movements you cannot control (seizure).  These symptoms may represent a serious problem that is an emergency. Do not wait to see if the symptoms will go away. Get medical help right away. Call your local emergency services (911 in the [place] Do not drive yourself to the hospital.  Summary  · You can prevent a stroke by eating healthy, exercising, not smoking, drinking less alcohol, and treating other health problems, such as diabetes, high blood pressure, or high cholesterol.  · Do not use any products that contain " nicotine or tobacco, such as cigarettes and e-cigarettes.  · Get help right away if you have any signs or symptoms of a stroke.  This information is not intended to replace advice given to you by your health care provider. Make sure you discuss any questions you have with your health care provider.  Document Released: 06/18/2013 Document Revised: 02/13/2020 Document Reviewed: 03/21/2018  [employer] Patient Education © 2020 [employer]

## 2022-01-10 NOTE — PROGRESS NOTES
Subjective     HPI  Maryjane Gray is a 56 y.o. right handed female who presents to [facility] for evaluation of left basal ganglia hemorrhage.    She presented to [facility] on 12/14/2021 with sudden onset of right facial droop and right sided weakness.  She had been clearing the driveway with her  that morning, he left for work and was soon contacted by his daughter that Maryjane was exhibiting right sided weakness. Blood pressure on admission 188/120. CT revealed left basal ganglia hemorrhage, she was transferred to [facility].  NIHSS on arrival 16. ICH score 1 for GCS of 11.    She had not been on any home medications prior to admission.     PMH includes: No known prior medical history.   Social History:  Denies tobacco, alcohol or drug use.   Family: Mother-stroke, brother -stroke x 2, familial HLD>     She was discharged on antihypertensives       She is here today with her spouse. She still has some right sided weakness and difficulty writing. She feels that her leg is a little weak, but the weakness is mostly in the right arm.  She is using the steps at home with stand-by supervision. She also has some difficulty with word-finding.  She has just started outpatient therapies.  Blood pressure at home has been 110s/60s.       Review of Systems   Constitutional: Positive for malaise/fatigue.   HENT: Negative for congestion and sinus pain.    Eyes: Negative for blurred vision.   Respiratory: Negative for cough and shortness of breath.    Cardiovascular: Negative for chest pain.   Gastrointestinal: Negative for constipation, diarrhea, heartburn, nausea and vomiting.   Musculoskeletal: Negative for back pain and neck pain.   Skin: Negative for rash.   Neurological: Positive for speech change and focal weakness. Negative for dizziness, tingling, sensory change and headaches.   Endo/Heme/Allergies: Bruises/bleeds easily.   Psychiatric/Behavioral: Negative for  "depression. The patient is not nervous/anxious.          Objective      I personally reviewed imaging below and agree with the findings  CT head 12/14/2021:   1 Left basal ganglia hemorrhage with surrounding mild vasogenic edema  1. Mass effect with partial effacement of left ventricle.  CT 12/16/2021  1 Unchanged size of the left basal ganglia intraparenchymal hemorrhage with mildly increased adjacent edema.  2.  Mildly increased mass effect on the left lateral ventricle.  3.  2 mm of left-to-right midline shift.  4.  No new intracranial hemorrhage.    Stroke Labs: Creat 0.68, Total cholesterol 264, triglycerides 184, HDL 48, , A1C 5.6.    /58 (BP Location: Right arm, Patient Position: Sitting, BP Cuff Size: Adult)   Pulse 71   Temp 36.6 °C (97.8 °F) (Temporal)   Resp 12   Ht 1.626 m (5' 4\")   Wt 64.6 kg (142 lb 6.7 oz)   SpO2 97%   BMI 24.45 kg/m²         Physical Exam  Constitutional:  Alert, no apparent distress,  Psych:   mood and affect WNL  Neuro:  Oriented to place,month, year, self.  Disoriented to age (states 55 and quickly corrects to 56), disoriented to day of the month (states January 11 instead of January 10),  speech with slight hesitancy at times,  naming and memory intact    CN II: Visual fields are full to confrontation. Fundoscopic exam is normal with sharp discs and no vascular changes. Pupils are 3 mm and briskly reactive to light.   CN III, IV, VI  EOMs intact, no ptosis  CN V: Facial sensation is decreased to PP R V1-V3 . Corneal responses are intact.  CN VII: Mild right smile droop noted..  CN VII: Hearing is normal to rubbing fingers  CN IX, X: Palate elevates symmetrically. Phonation is normal.  CN XI: Head turning and shoulder shrug are intact  CN XII: Tongue is midline with normal movements and no atrophy.              Strength 4/4, RUE, 5/5 LUE/BLE.  Slight drift RUE                 Sensation to PP decreased RUE                 No limb ataxia out of proportion to " weakness with finger to nose and heel to shin                 Ambulates with slight lag of RLE                               Biceps,brachioradialis, tricep, and patellar reflexes all 2+     Cardiovascular:    S1S2, no abnormal rhythm auscultated, no peripheral edema  Neck:                     No carotid bruits noted   Pulmonary:            Respirations easy, lungs clear to auscultation all fields.     Skin:                     No obvious rashes.      Iniital NIHSS   16      Current NIHSS    1a. LOC: 0  1b. LOC Questions: 1  1c. LOC Commands: 0  2. Best Gaze:0  3. Visual Fields: 0  4. Facial Paresis: 1  5a. Motor arm left: 0  5b. Motor arm right:1   6a. Motor leg left: 0  6b. Motor leg right: 0  7. Sensory: 1  8. Best Language: 1  9. Limb Ataxia: 0  10. Dysarthria: 0  11. Extinction/Inattention: 0    Total Score Current  5          Current mRS  3        Assessment & Plan     1. Intracranial hemorrhage (HCC), Left basal ganglia, likely due to previously undiagnosed HTN,  Continues to recover, current NIHSS 5, mRS 3.       ICH Score  Ney Coma Scale  GCS 11 = 1  3-4 (2 points), 5-12 (1 point), 13-15 (0 points)  Age greater than 80:No     No (0 points), yes (1 point)  ICH volume >/= 30 mL   NO  No (0 points), yes (1 point)  Intraventricular hemorrhage NO  No (0 points), yes (1 point)  Infratentorial origin of hemorrhage : No  No (0 points), yes (1 point)    Total score: 1    1 point: 13% mortality  2 points: 26% mortality  3 points: 72% mortality  4 points: 97% mortality  >/=5 points: 100% mortality      Stroke risk factors include HTN, HLD.       Avoid NSAIDS.    2. Primary hypertension      Blood pressure goal less than 130/80, currently at goal.    Medications refills x 30 days only, she is making an appointment with PCP.         3. Mixed hyperlipidemia    LDL goal < 70, current 179, start Crestor 10mg, discussed medication side effects, will need follow up with primary care evaluate liver function at intervals  and refill  Exercise at least 30 minutes daily, avoid red meat, fried foods, butter, cheese.   Eat 5-6 servings of vegetables and fruits daily, choose lean white meat without skin (chicken, turkey, white fish)--baked, broiled or grilled.        If any new signs of stroke:  sudden weakness, numbness, speech difficulty (slurring or difficulty finding words), imbalance, incoordination, worse headache of life or vision loss occur, call 911.      Take all medications as prescribed unless instructed by your provider.    I spent a total of 62 minutes caring for patient,  my time includes counseling, review of systems, HPI and assessment, review of images, labs and testing as above.  I reviewed the hospital records, PMH, social and family history.   I have counseled patient on stroke prevention strategies, stroke symptoms and mimics.  Diet and exercise modifications.  We discussed medication side effects and instructions.       I have provided patient a written personalized stroke prevention plan, it is filed under the media tab under ‘[facility]”.    Follow up PRN

## 2022-02-16 ENCOUNTER — OFFICE VISIT (OUTPATIENT)
Dept: PHYSICAL MEDICINE AND REHAB | Facility: REHABILITATION | Age: 57
End: 2022-02-16
Payer: COMMERCIAL

## 2022-02-16 VITALS
HEART RATE: 61 BPM | RESPIRATION RATE: 15 BRPM | HEIGHT: 64 IN | OXYGEN SATURATION: 97 % | TEMPERATURE: 98.4 F | DIASTOLIC BLOOD PRESSURE: 62 MMHG | WEIGHT: 136 LBS | SYSTOLIC BLOOD PRESSURE: 104 MMHG | BODY MASS INDEX: 23.22 KG/M2

## 2022-02-16 DIAGNOSIS — I61.9 HEMORRHAGIC STROKE (HCC): Primary | ICD-10-CM

## 2022-02-16 DIAGNOSIS — R41.3 MEMORY DEFICITS: ICD-10-CM

## 2022-02-16 PROCEDURE — 99214 OFFICE O/P EST MOD 30 MIN: CPT | Performed by: PHYSICAL MEDICINE & REHABILITATION

## 2022-02-16 RX ORDER — VITAMIN B COMPLEX
1000 TABLET ORAL DAILY
COMMUNITY

## 2022-02-16 RX ORDER — HYDRALAZINE HYDROCHLORIDE 25 MG/1
25 TABLET, FILM COATED ORAL 3 TIMES DAILY
COMMUNITY

## 2022-02-16 RX ORDER — AMLODIPINE BESYLATE 10 MG/1
10 TABLET ORAL DAILY
COMMUNITY

## 2022-02-16 RX ORDER — LISINOPRIL 5 MG/1
15 TABLET ORAL 2 TIMES DAILY
COMMUNITY

## 2022-02-16 ASSESSMENT — FIBROSIS 4 INDEX: FIB4 SCORE: 1.15

## 2022-02-16 NOTE — PROGRESS NOTES
[facility]  PM&R Neuro Rehabilitation Clinic  [facility] [place] [address]  Ph: [phone]    NEW PATIENT EVALUATION    *Patient established in PM&R practice, however, patient new to writer as patient is transferring care. Therefore, patient billed as established.     Patient Name: Maryjane Gray   Patient : 1965  Patient Age: 56 y.o.     Examining Physician: Dr. Mallory Murphy, DO    PM&R History to Date - Background Information:  Dates of Admission/Discharge to ARU: 2021-2021    SUBJECTIVE:   Patient Identification: Maryjane Gray is a 56 y.o. female with rehabilitation history significant for left sub-cortical MCA hemorrhagic stroke 2021 managed conservatively thought secondary to hypertensive emergency and is presenting to PM&R clinic for a NEW OUTPATIENT evaluation with the following chief complaint/s:    Chief Complaint: ARU discharge follow up    Accompanied by Today: Art, .   History of Present Illness:   - Records reviewed: Acute rehab discharge summary reviewed in its entirety.  - Therapy: Established with speech therapy through [facility]  - Feels about 90% back to normal. Some days she feels really tired. Some days she feels like she can do things but she cant like going up the stairs.   - When tired struggles for words to come out.   - Now showering by herself.   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   - Had COVID .   - No bowel or bladder issues.     Review of Systems:  All other pertinent positive review of systems are noted above in HPI.   All other systems reviewed and are negative.    Past Medical History:  Past Medical History:   Diagnosis Date    Stroke (HCC)       Past Surgical History:   Procedure Laterality Date    ABDOMINAL HYSTERECTOMY TOTAL          Current Outpatient Medications:     lisinopril (PRINIVIL) 5 MG Tab, Take 15 mg by mouth 2 times a day., Disp: , Rfl:     hydrALAZINE (APRESOLINE) 25 MG Tab, Take 25 mg  by mouth 3 times a day., Disp: , Rfl:     vitamin D3 (CHOLECALCIFEROL) 1000 Unit (25 mcg) Tab, Take 1,000 Units by mouth every day., Disp: , Rfl:     amLODIPine (NORVASC) 10 MG Tab, Take 10 mg by mouth every day., Disp: , Rfl:     rosuvastatin (CRESTOR) 10 MG Tab, Take 1 Tablet by mouth every evening., Disp: 30 Tablet, Rfl: 1  No Known Allergies     Past Social History:  Social History     Socioeconomic History    Marital status:      Spouse name: Not on file    Number of children: Not on file    Years of education: Not on file    Highest education level: Not on file   Occupational History    Not on file   Tobacco Use    Smoking status: Never    Smokeless tobacco: Never   Vaping Use    Vaping Use: Never used   Substance and Sexual Activity    Alcohol use: No    Drug use: No    Sexual activity: Not on file   Other Topics Concern    Not on file   Social History Narrative    Not on file     Social Determinants of Health     Financial Resource Strain: Not on file   Food Insecurity: Not on file   Transportation Needs: Not on file   Physical Activity: Not on file   Stress: Not on file   Social Connections: Not on file   Intimate Partner Violence: Not on file   Housing Stability: Not on file        Family History:  History reviewed. No pertinent family history.    Depression and Opioid Screening  PHQ-9:      12/19/2021     9:00 PM 12/25/2021    10:00 AM 1/10/2022    10:20 AM   Depression Screen (PHQ-2/PHQ-9)   PHQ-2 Total Score 0 0    PHQ-2 Total Score   0     Interpretation of PHQ-9 Total Score   Score Severity   1-4 No Depression   5-9 Mild Depression   10-14 Moderate Depression   15-19 Moderately Severe Depression   20-27 Severe Depression     OBJECTIVE:   Vital Signs:  Vitals:    02/16/22 1507   BP: 104/62   Pulse: 61   Resp: 15   Temp: 36.9 °C (98.4 °F)   SpO2: 97%        Pertinent Labs:  Lab Results   Component Value Date/Time    SODIUM 141 12/22/2021 05:17 AM    POTASSIUM 4.3 12/22/2021 05:17 AM    CHLORIDE  105 12/22/2021 05:17 AM    CO2 26 12/22/2021 05:17 AM    GLUCOSE 95 12/22/2021 05:17 AM    BUN 21 12/22/2021 05:17 AM    CREATININE 0.68 12/22/2021 05:17 AM       Lab Results   Component Value Date/Time    HBA1C 5.6 12/17/2021 07:37 AM       Lab Results   Component Value Date/Time    WBC 8.0 12/22/2021 05:17 AM    RBC 4.63 12/22/2021 05:17 AM    HEMOGLOBIN 14.0 12/22/2021 05:17 AM    HEMATOCRIT 43.3 12/22/2021 05:17 AM    MCV 93.5 12/22/2021 05:17 AM    MCH 30.2 12/22/2021 05:17 AM    MCHC 32.3 (L) 12/22/2021 05:17 AM    MPV 11.5 12/22/2021 05:17 AM    NEUTSPOLYS 55.00 12/18/2021 05:27 AM    LYMPHOCYTES 30.00 12/18/2021 05:27 AM    MONOCYTES 10.30 12/18/2021 05:27 AM    EOSINOPHILS 3.40 12/18/2021 05:27 AM    BASOPHILS 0.70 12/18/2021 05:27 AM       Lab Results   Component Value Date/Time    ASTSGOT 29 12/27/2021 06:15 AM    ALTSGPT 54 (H) 12/27/2021 06:15 AM        Physical Exam:   GEN: No apparent distress  HEENT: Head normocephalic, atraumatic.  Sclera nonicteric bilaterally, no ocular discharge appreciated bilaterally.  CV: Extremities warm and well-perfused, no peripheral edema appreciated bilaterally.  PULMONARY: Breathing nonlabored on room air, no respiratory accessory muscle use.  Not requiring supplemental oxygen.  SKIN: No appreciable skin breakdown on exposed areas of skin.  PSYCH: Mood and affect within normal limits.  NEURO: Awake alert.  Conversational.  Logical thought content.    Motor Exam Upper Extremities   ? Myotome R L   Shoulder Abduction C5 5 5   Elbow flexion C5 5 5   Wrist extension C6 5 5   Elbow extension C7 5 5   Finger flexion C8 5 5   Finger abduction T1 5 5     Motor Exam Lower Extremities  ? Myotome R L   Hip flexion L2 5 5   Knee extension L3 5 5   Ankle dorsiflexion L4 5 5   Toe extension L5 5 5   Ankle plantarflexion S1 5 5         Imaging:   CT head 12/14/2021  1.  Left basal ganglia hemorrhage with surrounding mild vasogenic edema    ASSESSMENT/PLAN: Maryjane Gray  is a 56  y.o. female with rehabilitation history significant for left sub-cortical MCA hemorrhagic stroke 12/14/2021 managed conservatively thought secondary to hypertensive emergency, here for evaluation. The following plan was discussed with the patient who is in agreement.     Visit Diagnoses     ICD-10-CM   1. Hemorrhagic stroke (HCC)  I61.9   2. Memory deficits  R41.3        Rehab/Neuro:   Left sub-cortical MCA hemorrhagic stroke 12/14/2021 managed conservatively thought secondary to hypertensive emergency  Post stroke fatigue; improving.  -Therapy: Discharged  -Returned to work: Works for [employer] . Recommend not returning to work yet.   -Driving status: Has not returned to driving yet.   -Counseled on likelihood of feeling fatigued when she returns to work but should continue to improve.     Sleep:   -Status: Poor sleep.  Has a very difficult time going to sleep.  -Counseled on different options for pharmacologic management, patient wishes to hold on medications at this time.     GI/Diet:   1.  Poor appetite, loss of taste.  -Counseled on retraining taste buds.  -Recommend Ensure or boost with every meal   -Happy to provide nutritionist/dietitian handout if needed.  -Counseled on potential to use medications, patient wishes to stay off of medications at this time.         Follow up: 6 weeks return to work eval    Total time spent was 31 minutes.  Included in this time is the time spent preparing for the visit including record review, my exam and evaluation, counseling and education regarding that which is aforementioned in the assessment and plan.  Time was spent documenting into patient's electronic health record.  Time was spent ordering the appropriate labs, tests, procedures, referrals, medications.  Including this time was also the time spent in care coordination. Included this time as the time spent obtaining history from someone other than the patient.  Some of the time included occurred outside of  charting time.  Discussion involved the patient and .      Please note that this dictation was created using voice recognition software. I have made every reasonable attempt to correct obvious errors but there may be errors of grammar and content that I may have overlooked prior to finalization of this note.    Dr. Mallory Murphy DO, MS  Department of Physical Medicine & Rehabilitation  Neuro Rehabilitation Clinic  [facility]

## 2022-03-24 ENCOUNTER — OFFICE VISIT (OUTPATIENT)
Dept: PHYSICAL MEDICINE AND REHAB | Facility: REHABILITATION | Age: 57
End: 2022-03-24
Payer: COMMERCIAL

## 2022-03-24 VITALS
BODY MASS INDEX: 22.36 KG/M2 | HEIGHT: 64 IN | SYSTOLIC BLOOD PRESSURE: 100 MMHG | TEMPERATURE: 98.1 F | HEART RATE: 64 BPM | OXYGEN SATURATION: 98 % | WEIGHT: 131 LBS | DIASTOLIC BLOOD PRESSURE: 58 MMHG | RESPIRATION RATE: 14 BRPM

## 2022-03-24 DIAGNOSIS — R41.3 MEMORY DEFICITS: ICD-10-CM

## 2022-03-24 DIAGNOSIS — R53.83 OTHER FATIGUE: ICD-10-CM

## 2022-03-24 DIAGNOSIS — I61.9 HEMORRHAGIC STROKE (HCC): Primary | ICD-10-CM

## 2022-03-24 PROCEDURE — 99213 OFFICE O/P EST LOW 20 MIN: CPT | Performed by: PHYSICAL MEDICINE & REHABILITATION

## 2022-03-24 ASSESSMENT — FIBROSIS 4 INDEX: FIB4 SCORE: 1.15

## 2022-03-24 NOTE — PROGRESS NOTES
[facility]  PM&R Neuro Rehabilitation Clinic  [facility] [place] [address]  Ph: [phone]    FOLLOW UP PATIENT EVALUATION    Patient Name: Maryjane Gray   Patient : 1965  Patient Age: 56 y.o.     Examining Physician: Dr. Mallory Murphy, DO    PM&R History to Date - Background Information:  Dates of Admission/Discharge to ARU: 2021-2021    SUBJECTIVE:   Patient Identification: Maryjane Gray is a 56 y.o. female with rehabilitation history significant for left sub-cortical MCA hemorrhagic stroke 2021 managed conservatively thought secondary to hypertensive emergency and is presenting to PM&R clinic for a FOLLOW UP OUTPATIENT evaluation with the following chief complaint/s:    Chief Complaint: Stroke follow-up.    Accompanied by Today: Art, .   History of Present Illness:   - Fatigue is wearing off.   - Goes to bed around 9 and goes to sleep around 12.   - Has been driving and hasn't had issues.   - Will be able to work part time when she starts back.   - Is about 95%.   - Therapy: speech therapy discharged her.   - Has tried Nyquil, melatonin.   - Is on higher dose of vitamin d now.   - Has a hard time eating. Has really poor appetite. Poor taste.      Review of Systems:  All other pertinent positive review of systems are noted above in HPI.   All other systems reviewed and are negative.    Past Medical History:  Past Medical History:   Diagnosis Date   • Stroke (HCC)       Past Surgical History:   Procedure Laterality Date   • ABDOMINAL HYSTERECTOMY TOTAL          Current Outpatient Medications:   •  lisinopril (PRINIVIL) 5 MG Tab, Take 15 mg by mouth 2 times a day., Disp: , Rfl:   •  hydrALAZINE (APRESOLINE) 25 MG Tab, Take 25 mg by mouth 3 times a day., Disp: , Rfl:   •  vitamin D3 (CHOLECALCIFEROL) 1000 Unit (25 mcg) Tab, Take 1,000 Units by mouth every day., Disp: , Rfl:   •  amLODIPine (NORVASC) 10 MG Tab, Take 10 mg by mouth every day., Disp: , Rfl:   •   rosuvastatin (CRESTOR) 10 MG Tab, Take 1 Tablet by mouth every evening., Disp: 30 Tablet, Rfl: 1  No Known Allergies     Past Social History:  Social History     Socioeconomic History   • Marital status:      Spouse name: Not on file   • Number of children: Not on file   • Years of education: Not on file   • Highest education level: Not on file   Occupational History   • Not on file   Tobacco Use   • Smoking status: Never Smoker   • Smokeless tobacco: Never Used   Vaping Use   • Vaping Use: Never used   Substance and Sexual Activity   • Alcohol use: No   • Drug use: No   • Sexual activity: Not on file   Other Topics Concern   • Not on file   Social History Narrative   • Not on file     Social Determinants of Health     Financial Resource Strain: Not on file   Food Insecurity: Not on file   Transportation Needs: Not on file   Physical Activity: Not on file   Stress: Not on file   Social Connections: Not on file   Intimate Partner Violence: Not on file   Housing Stability: Not on file        Family History:  History reviewed. No pertinent family history.    Depression and Opioid Screening  PHQ-9:  Depression Screen (PHQ-2/PHQ-9) 12/19/2021 12/25/2021 1/10/2022   PHQ-2 Total Score 0 0 -   PHQ-2 Total Score - - 0     Interpretation of PHQ-9 Total Score   Score Severity   1-4 No Depression   5-9 Mild Depression   10-14 Moderate Depression   15-19 Moderately Severe Depression   20-27 Severe Depression     OBJECTIVE:   Vital Signs:  Vitals:    03/24/22 0909   BP: 100/58   Pulse: 64   Resp: 14   Temp: 36.7 °C (98.1 °F)   SpO2: 98%        Pertinent Labs:  Lab Results   Component Value Date/Time    SODIUM 141 12/22/2021 05:17 AM    POTASSIUM 4.3 12/22/2021 05:17 AM    CHLORIDE 105 12/22/2021 05:17 AM    CO2 26 12/22/2021 05:17 AM    GLUCOSE 95 12/22/2021 05:17 AM    BUN 21 12/22/2021 05:17 AM    CREATININE 0.68 12/22/2021 05:17 AM       Lab Results   Component Value Date/Time    HBA1C 5.6 12/17/2021 07:37 AM       Lab  Results   Component Value Date/Time    WBC 8.0 12/22/2021 05:17 AM    RBC 4.63 12/22/2021 05:17 AM    HEMOGLOBIN 14.0 12/22/2021 05:17 AM    HEMATOCRIT 43.3 12/22/2021 05:17 AM    MCV 93.5 12/22/2021 05:17 AM    MCH 30.2 12/22/2021 05:17 AM    MCHC 32.3 (L) 12/22/2021 05:17 AM    MPV 11.5 12/22/2021 05:17 AM    NEUTSPOLYS 55.00 12/18/2021 05:27 AM    LYMPHOCYTES 30.00 12/18/2021 05:27 AM    MONOCYTES 10.30 12/18/2021 05:27 AM    EOSINOPHILS 3.40 12/18/2021 05:27 AM    BASOPHILS 0.70 12/18/2021 05:27 AM       Lab Results   Component Value Date/Time    ASTSGOT 29 12/27/2021 06:15 AM    ALTSGPT 54 (H) 12/27/2021 06:15 AM        Physical Exam:   GEN: No apparent distress  HEENT: Head normocephalic, atraumatic.  Sclera nonicteric bilaterally, no ocular discharge appreciated bilaterally.  CV: Extremities warm and well-perfused, no peripheral edema appreciated bilaterally.  PULMONARY: Breathing nonlabored on room air, no respiratory accessory muscle use.  Not requiring supplemental oxygen.  SKIN: No appreciable skin breakdown on exposed areas of skin.  PSYCH: Mood and affect within normal limits.  NEURO: Awake alert.  Conversational.  Logical thought content.  Ambulatory without assistive device.    Imaging:   CT head 12/14/2021  1.  Left basal ganglia hemorrhage with surrounding mild vasogenic edema    ASSESSMENT/PLAN: Maryjane Gray  is a 56 y.o. female with rehabilitation history significant for left sub-cortical MCA hemorrhagic stroke 12/14/2021 managed conservatively thought secondary to hypertensive emergency, here for evaluation. The following plan was discussed with the patient who is in agreement.     Visit Diagnoses     ICD-10-CM   1. Hemorrhagic stroke (HCC)  I61.9   2. Memory deficits  R41.3   3. Other fatigue  R53.83       assists with history.    Rehab/Neuro:   1. Left sub-cortical MCA hemorrhagic stroke 12/14/2021 managed conservatively thought secondary to hypertensive emergency  2. Post stroke  fatigue; improving.  -Therapy: Discharged  -Returned to work: Works for [employer] .  Time spent drafting letter for return to work April 4.  -Driving status: Successfully return to driving.  -Counseled on likelihood of feeling fatigued when she returns to work but should continue to improve.    Sleep:   -Status: Poor sleep.  Has a very difficult time going to sleep.  -Counseled on different options for pharmacologic management, patient wishes to hold on medications at this time.    GI/Diet:   1.  Poor appetite, loss of taste.  -Counseled on retraining taste buds.  -Recommend Ensure or boost with every meal   -Happy to provide nutritionist/dietitian handout if needed.  -Counseled on potential to use medications, patient wishes to stay off of medications at this time.      Follow up: As needed.    Total time spent was 20 minutes.  Included in this time is the time spent preparing for the visit including record review, my exam and evaluation, counseling and education regarding that which is aforementioned in the assessment and plan.  Time was spent documenting into patient's electronic health record.  Time was spent drafting return to work letter.  Included this time as the time spent obtaining history from someone other than the patient. Discussion involved the patient and .    Please note that this dictation was created using voice recognition software. I have made every reasonable attempt to correct obvious errors but there may be errors of grammar and content that I may have overlooked prior to finalization of this note.    Dr. Mallory Murphy DO, MS  Department of Physical Medicine & Rehabilitation  Neuro Rehabilitation Clinic  [facility]

## 2022-03-24 NOTE — LETTER
DOS: 3/24/2022   Re: Maryjane Gray    To Whom It May Concern,     Maryjane was seen in my office today, 3/24/2022. Maryjane is medically cleared to return to work. I recommend that she return in part time capacity with increase to full time gradually as deemed appropriate by Maryjane.     If there are any questions or concerns, please do not hesitate to contact my office :  Phone: [phone]  Fax: [phone]    Dr. Mallory Weinstein DO, MS  Department of Physical Medicine & Rehabilitation  Neuro Rehabilitation Clinic  [facility]

## 2022-03-30 ENCOUNTER — APPOINTMENT (OUTPATIENT)
Dept: PHYSICAL MEDICINE AND REHAB | Facility: REHABILITATION | Age: 57
End: 2022-03-30
Payer: COMMERCIAL

## 2022-04-05 ENCOUNTER — APPOINTMENT (OUTPATIENT)
Dept: OCCUPATIONAL THERAPY | Facility: REHABILITATION | Age: 57
End: 2022-04-05
Attending: PHYSICAL MEDICINE & REHABILITATION
Payer: COMMERCIAL

## 2024-03-07 NOTE — CARE PLAN
"The patient is Stable - Low risk of patient condition declining or worsening       Problem: Fall Risk - Rehab  Goal: Patient will remain free from falls  Outcome: Progressing  Note: Liz Almodovar Fall risk Assessment Score: 11  Moderate fall risk Interventions  - Bed and strip alarm   - Yellow sign by the door   - Yellow wrist band \"Fall risk\"  - Room near to the nurse station  - Do not leave patient unattended in the bathroom  - Fall risk education provided     Problem: Pain - Standard  Goal: Alleviation of pain or a reduction in pain to the patient’s comfort goal  Outcome: Progressing  Flowsheets  Taken 12/19/2021 2100  Pain Rating Scale (NPRS): 0  Taken 12/18/2021 2324  OB Pain Intervention:   Medication - See MAR   Food   Rest   Declines     " no s/s of aspiration; clear vocal quality post swallow clear vocal quality post swallow

## 2024-05-01 ENCOUNTER — NON-PROVIDER VISIT (OUTPATIENT)
Dept: URGENT CARE | Facility: CLINIC | Age: 59
End: 2024-05-01

## 2024-05-01 DIAGNOSIS — Z11.1 ENCOUNTER FOR PPD TEST: ICD-10-CM

## 2024-05-01 PROCEDURE — 86580 TB INTRADERMAL TEST: CPT | Performed by: FAMILY MEDICINE

## 2024-05-01 NOTE — PROGRESS NOTES
1. TB evaluation form completed by patient.     2. Pt notified to return to clinic for reading within 48-72 hours.     3. PPD placement documentation completed on TB questionnaire    4. TB Eval form filed at

## 2024-05-03 ENCOUNTER — NON-PROVIDER VISIT (OUTPATIENT)
Dept: URGENT CARE | Facility: CLINIC | Age: 59
End: 2024-05-03

## 2024-05-03 LAB — TB WHEAL 3D P 5 TU DIAM: NORMAL MM

## 2024-05-03 PROCEDURE — 99999 PR NO CHARGE: CPT
